# Patient Record
Sex: MALE | Race: WHITE | NOT HISPANIC OR LATINO | Employment: FULL TIME | URBAN - METROPOLITAN AREA
[De-identification: names, ages, dates, MRNs, and addresses within clinical notes are randomized per-mention and may not be internally consistent; named-entity substitution may affect disease eponyms.]

---

## 2017-09-12 ENCOUNTER — TELEPHONE (OUTPATIENT)
Dept: INTERNAL MEDICINE | Facility: CLINIC | Age: 37
End: 2017-09-12

## 2017-09-12 NOTE — TELEPHONE ENCOUNTER
I returned call to patient and advised him of the need to be seen and he did schedule an appointment with Claudette scott

## 2017-09-12 NOTE — TELEPHONE ENCOUNTER
----- Message from Yuko James sent at 9/12/2017 12:31 PM CDT -----  Pt has a bone spur in his back that Dr Cohen is aware of.  He said every now and then it acts up with pain.  He did something yesterday so it's hurting him today.  He wanted to see if something could be called into [facility].  Call pt at [phone] to let him know this can be done.

## 2017-09-13 ENCOUNTER — OFFICE VISIT (OUTPATIENT)
Dept: URGENT CARE | Facility: CLINIC | Age: 37
End: 2017-09-13
Payer: COMMERCIAL

## 2017-09-13 VITALS
WEIGHT: 180.13 LBS | BODY MASS INDEX: 28.95 KG/M2 | OXYGEN SATURATION: 99 % | TEMPERATURE: 98 F | HEIGHT: 66 IN | DIASTOLIC BLOOD PRESSURE: 90 MMHG | HEART RATE: 71 BPM | SYSTOLIC BLOOD PRESSURE: 128 MMHG

## 2017-09-13 DIAGNOSIS — M54.50 ACUTE LEFT-SIDED LOW BACK PAIN WITHOUT SCIATICA: Primary | ICD-10-CM

## 2017-09-13 PROCEDURE — 99214 OFFICE O/P EST MOD 30 MIN: CPT | Mod: 25,S$GLB,, | Performed by: NURSE PRACTITIONER

## 2017-09-13 PROCEDURE — 99999 PR PBB SHADOW E&M-EST. PATIENT-LVL III: CPT | Mod: PBBFAC,,, | Performed by: NURSE PRACTITIONER

## 2017-09-13 PROCEDURE — 96372 THER/PROPH/DIAG INJ SC/IM: CPT | Mod: S$GLB,,, | Performed by: NURSE PRACTITIONER

## 2017-09-13 PROCEDURE — 3080F DIAST BP >= 90 MM HG: CPT | Mod: S$GLB,,, | Performed by: NURSE PRACTITIONER

## 2017-09-13 PROCEDURE — 3008F BODY MASS INDEX DOCD: CPT | Mod: S$GLB,,, | Performed by: NURSE PRACTITIONER

## 2017-09-13 PROCEDURE — 3074F SYST BP LT 130 MM HG: CPT | Mod: S$GLB,,, | Performed by: NURSE PRACTITIONER

## 2017-09-13 RX ORDER — NAPROXEN 500 MG/1
500 TABLET ORAL 2 TIMES DAILY PRN
Qty: 30 TABLET | Refills: 0 | Status: SHIPPED | OUTPATIENT
Start: 2017-09-13 | End: 2017-09-23

## 2017-09-13 RX ORDER — CYCLOBENZAPRINE HCL 5 MG
5 TABLET ORAL 3 TIMES DAILY PRN
Qty: 21 TABLET | Refills: 0 | Status: SHIPPED | OUTPATIENT
Start: 2017-09-13 | End: 2017-09-20

## 2017-09-13 RX ORDER — KETOROLAC TROMETHAMINE 30 MG/ML
60 INJECTION, SOLUTION INTRAMUSCULAR; INTRAVENOUS
Status: COMPLETED | OUTPATIENT
Start: 2017-09-13 | End: 2017-09-13

## 2017-09-13 RX ADMIN — KETOROLAC TROMETHAMINE 60 MG: 30 INJECTION, SOLUTION INTRAMUSCULAR; INTRAVENOUS at 10:09

## 2017-09-13 NOTE — PATIENT INSTRUCTIONS
Self-Care for Low Back Pain    Most people have low back pain now and then. In many cases, it isnt serious and self-care can help. Sometimes low back pain can be a sign of a bigger problem. Call your healthcare provider if your pain returns often or gets worse over time. For the long-term care of your back, get regular exercise, lose any excess weight and learn good posture.  Take a short rest  Lying down during the day may be beneficial for short periods of time if severe pain increases with sitting or standing. Long-term bed rest could be detrimental.  Reduce pain and swelling  Cold reduces swelling. Both cold and heat can reduce pain. Protect your skin by placing a towel between your body and the ice or heat source.  · For the first few days, apply an ice pack for 15 to 20 minutes .  · After the first few days, try heat for 15 minutes at a time to ease pain. Never sleep on a heating pad.  · Over-the-counter medicine can help control pain and swelling. Try aspirin or ibuprofen.  Exercise  Exercise can help your back heal. It also helps your back get stronger and more flexible, preventing any reinjury. Ask your healthcare provider about specific exercises for your back.  Use good posture to avoid reinjury  · When moving, bend at the hips and knees. Dont bend at the waist or twist around.  · When lifting, keep the object close to your body. Dont try to lift more than you can handle.  · When sitting, keep your lower back supported. Use a rolled-up towel as needed.  Seek immediate medical care if:  · Youre unable to stand or walk.  · You have a temperature over 100.4°F (38.0°C)  · You have frequent, painful, or bloody urination.  · You have severe abdominal pain.  · You have a sharp, stabbing pain.  · Your pain is constant.  · You have pain or numbness in your leg.  · You feel pain in a new area of your back.  · You notice that the pain isnt decreasing after more than a week.   Date Last Reviewed: 9/29/2015  ©  9818-6939 [employer]. [address] [place] [address], [place] [address]. All rights reserved. This information is not intended as a substitute for professional medical care. Always follow your healthcare professional's instructions.        Self-Care for Low Back Pain    Most people have low back pain now and then. In many cases, it isnt serious and self-care can help. Sometimes low back pain can be a sign of a bigger problem. Call your healthcare provider if your pain returns often or gets worse over time. For the long-term care of your back, get regular exercise, lose any excess weight and learn good posture.  Take a short rest  Lying down during the day may be beneficial for short periods of time if severe pain increases with sitting or standing. Long-term bed rest could be detrimental.  Reduce pain and swelling  Cold reduces swelling. Both cold and heat can reduce pain. Protect your skin by placing a towel between your body and the ice or heat source.  · For the first few days, apply an ice pack for 15 to 20 minutes .  · After the first few days, try heat for 15 minutes at a time to ease pain. Never sleep on a heating pad.  · Over-the-counter medicine can help control pain and swelling. Try aspirin or ibuprofen.  Exercise  Exercise can help your back heal. It also helps your back get stronger and more flexible, preventing any reinjury. Ask your healthcare provider about specific exercises for your back.  Use good posture to avoid reinjury  · When moving, bend at the hips and knees. Dont bend at the waist or twist around.  · When lifting, keep the object close to your body. Dont try to lift more than you can handle.  · When sitting, keep your lower back supported. Use a rolled-up towel as needed.  Seek immediate medical care if:  · Youre unable to stand or walk.  · You have a temperature over 100.4°F (38.0°C)  · You have frequent, painful, or bloody urination.  · You have severe abdominal pain.  · You  have a sharp, stabbing pain.  · Your pain is constant.  · You have pain or numbness in your leg.  · You feel pain in a new area of your back.  · You notice that the pain isnt decreasing after more than a week.   Date Last Reviewed: 9/29/2015  © 3682-5626 [employer]. [address] [place] [address], [place] [address]. All rights reserved. This information is not intended as a substitute for professional medical care. Always follow your healthcare professional's instructions.

## 2017-09-13 NOTE — PROGRESS NOTES
"Subjective:       Patient ID: Iban Amos is a 37 y.o. male.    Chief Complaint: Back Pain    Back Pain   This is a new problem. Episode onset: 2 days ago. The problem occurs constantly. The problem is unchanged. The pain is present in the lumbar spine. The quality of the pain is described as stabbing. The pain radiates to the left thigh. The pain is at a severity of 6/10. The pain is the same all the time. The symptoms are aggravated by bending, position and twisting. Associated symptoms include leg pain. Pertinent negatives include no abdominal pain, bladder incontinence, bowel incontinence, chest pain, dysuria, fever, headaches, numbness, paresis, paresthesias, tingling or weakness. Treatments tried: Aleve/ Advil.       BP (!) 128/90 (BP Location: Left arm, Patient Position: Sitting, BP Method: Medium (Automatic))   Pulse 71   Temp 97.6 °F (36.4 °C) (Tympanic)   Ht 5' 6" (1.676 m)   Wt 81.7 kg (180 lb 1.9 oz)   SpO2 99%   BMI 29.07 kg/m²     Review of Systems   Constitutional: Negative for chills, diaphoresis and fever.   HENT: Negative for congestion, ear discharge, ear pain, postnasal drip, sinus pressure and sore throat.    Respiratory: Negative for cough, chest tightness and shortness of breath.    Cardiovascular: Negative for chest pain and palpitations.   Gastrointestinal: Negative for abdominal distention, abdominal pain, bowel incontinence, diarrhea, nausea and vomiting.   Genitourinary: Negative for bladder incontinence, difficulty urinating and dysuria.   Musculoskeletal: Positive for back pain. Negative for myalgias.   Skin: Negative for rash and wound.   Allergic/Immunologic: Negative for immunocompromised state.   Neurological: Negative for dizziness, tingling, weakness, light-headedness, numbness, headaches and paresthesias.   Hematological: Does not bruise/bleed easily.   Psychiatric/Behavioral: Negative for behavioral problems and confusion.       Objective:      Physical Exam "   Constitutional: He is oriented to person, place, and time. He appears well-developed and well-nourished. No distress.   Eyes: Conjunctivae and EOM are normal. Pupils are equal, round, and reactive to light.   Neck: Normal range of motion. Neck supple.   Cardiovascular: Normal rate, regular rhythm, normal heart sounds and intact distal pulses.    Pulmonary/Chest: Effort normal and breath sounds normal. No respiratory distress. He has no wheezes.   Abdominal: Soft. Bowel sounds are normal.   Musculoskeletal:        Lumbar back: He exhibits tenderness, pain and spasm. He exhibits normal range of motion, no bony tenderness, no swelling and no deformity.        Back:        Normal gait  Posture is normal     Able to touch toes with moderate discomfort   Leans  back  and from side to side with moderate discomfort  Torso rotation with moderate  discomfort.   There is tenderness of lumbar spine  Nontender sacroiliacs  Negative straight leg test, No swelling, erythema, rash, numbness, tingling  Denies numbness, focal weakness, urinary/fecal incontinence, saddle anesthesia, fever, or chills.     Neurological: He is alert and oriented to person, place, and time.   Skin: Skin is warm and dry. No rash noted.   Psychiatric: He has a normal mood and affect. His behavior is normal.   Nursing note and vitals reviewed.      Assessment:       1. Acute left-sided low back pain without sciatica        Plan:       Iban Suarez was seen today for back pain.    Diagnoses and all orders for this visit:    Acute left-sided low back pain without sciatica  -     ketorolac injection 60 mg; Inject 2 mLs (60 mg total) into the muscle one time.  -     naproxen (NAPROSYN) 500 MG tablet; Take 1 tablet (500 mg total) by mouth 2 (two) times daily as needed (pain).  -     cyclobenzaprine (FLEXERIL) 5 MG tablet; Take 1 tablet (5 mg total) by mouth 3 (three) times daily as needed for Muscle spasms.    - Ice may work best the first two days after  injury, apply an ice pack to the painful area for 20  minutes every 2-4 hours.      -  Use heating pad to back for 20-30 minutes several times a day. May alternate with ice depending on what provides the best relief.  -   May try ower back exercises and stretches and gentle massage.  -   Avoid heavy lifting or any activities that aggravate back pain.     -   Maintain good posture.  Follow up with PCP in 2 weeks if no improvement or sooner if pain worsens.  Go to ER immediately if bowel/bladder incontinence, numbness/tingling in lower extremities, urinary retention or bilateral leg weakness.

## 2017-09-18 DIAGNOSIS — I10 ESSENTIAL HYPERTENSION: Primary | ICD-10-CM

## 2017-09-26 ENCOUNTER — OFFICE VISIT (OUTPATIENT)
Dept: INTERNAL MEDICINE | Facility: CLINIC | Age: 37
End: 2017-09-26
Payer: COMMERCIAL

## 2017-09-26 ENCOUNTER — LAB VISIT (OUTPATIENT)
Dept: LAB | Facility: HOSPITAL | Age: 37
End: 2017-09-26
Attending: FAMILY MEDICINE
Payer: COMMERCIAL

## 2017-09-26 DIAGNOSIS — I10 ESSENTIAL HYPERTENSION: ICD-10-CM

## 2017-09-26 DIAGNOSIS — Z00.00 ROUTINE GENERAL MEDICAL EXAMINATION AT A HEALTH CARE FACILITY: Primary | ICD-10-CM

## 2017-09-26 DIAGNOSIS — R25.1 TREMOR: ICD-10-CM

## 2017-09-26 DIAGNOSIS — E78.5 HYPERLIPIDEMIA, UNSPECIFIED HYPERLIPIDEMIA TYPE: ICD-10-CM

## 2017-09-26 DIAGNOSIS — G47.30 SLEEP APNEA, UNSPECIFIED TYPE: ICD-10-CM

## 2017-09-26 DIAGNOSIS — Z00.00 ROUTINE GENERAL MEDICAL EXAMINATION AT A HEALTH CARE FACILITY: ICD-10-CM

## 2017-09-26 LAB
25(OH)D3+25(OH)D2 SERPL-MCNC: 23 NG/ML
ALBUMIN SERPL BCP-MCNC: 4.2 G/DL
ALP SERPL-CCNC: 96 U/L
ALT SERPL W/O P-5'-P-CCNC: 54 U/L
ANION GAP SERPL CALC-SCNC: 8 MMOL/L
AST SERPL-CCNC: 28 U/L
BASOPHILS # BLD AUTO: 0.08 K/UL
BASOPHILS # BLD AUTO: 0.08 K/UL
BASOPHILS NFR BLD: 1 %
BASOPHILS NFR BLD: 1 %
BILIRUB SERPL-MCNC: 0.8 MG/DL
BUN SERPL-MCNC: 15 MG/DL
CALCIUM SERPL-MCNC: 9.5 MG/DL
CHLORIDE SERPL-SCNC: 105 MMOL/L
CHOLEST SERPL-MCNC: 213 MG/DL
CHOLEST/HDLC SERPL: 4.6 {RATIO}
CO2 SERPL-SCNC: 28 MMOL/L
CREAT SERPL-MCNC: 0.9 MG/DL
CREAT UR-MCNC: 139 MG/DL
DIFFERENTIAL METHOD: ABNORMAL
DIFFERENTIAL METHOD: ABNORMAL
EOSINOPHIL # BLD AUTO: 0.5 K/UL
EOSINOPHIL # BLD AUTO: 0.5 K/UL
EOSINOPHIL NFR BLD: 5.8 %
EOSINOPHIL NFR BLD: 5.8 %
ERYTHROCYTE [DISTWIDTH] IN BLOOD BY AUTOMATED COUNT: 12.4 %
ERYTHROCYTE [DISTWIDTH] IN BLOOD BY AUTOMATED COUNT: 12.4 %
EST. GFR  (AFRICAN AMERICAN): >60 ML/MIN/1.73 M^2
EST. GFR  (NON AFRICAN AMERICAN): >60 ML/MIN/1.73 M^2
ESTIMATED AVG GLUCOSE: 94 MG/DL
GLUCOSE SERPL-MCNC: 76 MG/DL
HBA1C MFR BLD HPLC: 4.9 %
HCT VFR BLD AUTO: 44.7 %
HCT VFR BLD AUTO: 44.7 %
HDLC SERPL-MCNC: 46 MG/DL
HDLC SERPL: 21.6 %
HGB BLD-MCNC: 15.7 G/DL
HGB BLD-MCNC: 15.7 G/DL
LDLC SERPL CALC-MCNC: 135 MG/DL
LYMPHOCYTES # BLD AUTO: 3.1 K/UL
LYMPHOCYTES # BLD AUTO: 3.1 K/UL
LYMPHOCYTES NFR BLD: 37.8 %
LYMPHOCYTES NFR BLD: 37.8 %
MCH RBC QN AUTO: 31.9 PG
MCH RBC QN AUTO: 31.9 PG
MCHC RBC AUTO-ENTMCNC: 35.1 G/DL
MCHC RBC AUTO-ENTMCNC: 35.1 G/DL
MCV RBC AUTO: 91 FL
MCV RBC AUTO: 91 FL
MICROALBUMIN UR DL<=1MG/L-MCNC: 10 UG/ML
MICROALBUMIN/CREATININE RATIO: 7.2 UG/MG
MONOCYTES # BLD AUTO: 0.8 K/UL
MONOCYTES # BLD AUTO: 0.8 K/UL
MONOCYTES NFR BLD: 10 %
MONOCYTES NFR BLD: 10 %
NEUTROPHILS # BLD AUTO: 3.8 K/UL
NEUTROPHILS # BLD AUTO: 3.8 K/UL
NEUTROPHILS NFR BLD: 45 %
NEUTROPHILS NFR BLD: 45 %
NONHDLC SERPL-MCNC: 167 MG/DL
PLATELET # BLD AUTO: 203 K/UL
PLATELET # BLD AUTO: 203 K/UL
PMV BLD AUTO: 11.2 FL
PMV BLD AUTO: 11.2 FL
POTASSIUM SERPL-SCNC: 3.7 MMOL/L
PROT SERPL-MCNC: 7.7 G/DL
RBC # BLD AUTO: 4.92 M/UL
RBC # BLD AUTO: 4.92 M/UL
SODIUM SERPL-SCNC: 141 MMOL/L
T4 FREE SERPL-MCNC: 0.9 NG/DL
TRIGL SERPL-MCNC: 160 MG/DL
TSH SERPL DL<=0.005 MIU/L-ACNC: 1.17 UIU/ML
TSH SERPL DL<=0.005 MIU/L-ACNC: 1.17 UIU/ML
WBC # BLD AUTO: 8.31 K/UL
WBC # BLD AUTO: 8.31 K/UL

## 2017-09-26 PROCEDURE — 99999 PR PBB SHADOW E&M-EST. PATIENT-LVL III: CPT | Mod: PBBFAC,,, | Performed by: FAMILY MEDICINE

## 2017-09-26 PROCEDURE — 36415 COLL VENOUS BLD VENIPUNCTURE: CPT | Mod: PO

## 2017-09-26 PROCEDURE — 82570 ASSAY OF URINE CREATININE: CPT

## 2017-09-26 PROCEDURE — 84439 ASSAY OF FREE THYROXINE: CPT

## 2017-09-26 PROCEDURE — 85025 COMPLETE CBC W/AUTO DIFF WBC: CPT

## 2017-09-26 PROCEDURE — 80053 COMPREHEN METABOLIC PANEL: CPT

## 2017-09-26 PROCEDURE — 84443 ASSAY THYROID STIM HORMONE: CPT

## 2017-09-26 PROCEDURE — 83036 HEMOGLOBIN GLYCOSYLATED A1C: CPT

## 2017-09-26 PROCEDURE — 80061 LIPID PANEL: CPT

## 2017-09-26 PROCEDURE — 99395 PREV VISIT EST AGE 18-39: CPT | Mod: S$GLB,,, | Performed by: FAMILY MEDICINE

## 2017-09-26 PROCEDURE — 82306 VITAMIN D 25 HYDROXY: CPT

## 2017-09-26 RX ORDER — LISINOPRIL 10 MG/1
10 TABLET ORAL DAILY
Qty: 90 TABLET | Refills: 3 | Status: SHIPPED | OUTPATIENT
Start: 2017-09-26 | End: 2018-06-04 | Stop reason: SDUPTHER

## 2017-09-30 VITALS
WEIGHT: 178.56 LBS | DIASTOLIC BLOOD PRESSURE: 89 MMHG | SYSTOLIC BLOOD PRESSURE: 138 MMHG | TEMPERATURE: 99 F | HEIGHT: 66 IN | BODY MASS INDEX: 28.7 KG/M2

## 2017-09-30 NOTE — PROGRESS NOTES
Subjective:      Patient ID: Iban Amos is a 37 y.o. male.    Chief Complaint: Annual Exam     The patient is a 37-year-old  of Viv Amos, a patient of [facility], coming in today because for prevention exam.   htn- not controlled today however in the past has been controlled with lisinopril 10.  He's mainly been out of it ROM is to full year.  Never really got it refilled.  Should have and just didn't make the time.  Also didn't do lab work last year.    Sleep apnea- using nasal device. Doing well.  Hyperlipidemia- elevated Tg in past. Cut out sodas now.  Willing to repeat labs today.  Is having recurring headaches.  His wife's also notices had a tremor with dinner.  He doesn't seem to think it's bothersome but she notices it.  There is no family history of a tremor.                                                                               PAST MEDICAL HISTORY:  Sleep apnea, htn, lumbar back spasms.                                                                                                                        PAST SURGICAL HISTORY:  Tonsillectomy and nose reconstruction.                                                                                            ALLERGIES:  He is allergic to codeine and penicillin.                                                                                                     FAMILY HISTORY:  Mother  due to breast cancer.  Father has high blood    pressure and heart trouble and he had a stroke.  Brother had diabetes and    obesity that he is aware of.                                                                                                                              SOCIAL HISTORY:  He is  to Viv Amos, a patient of mine.   He does not smoke.  He does drink alcohol     once a month.  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             Lab Results   Component Value Date    WBC 2017    WBC 82017     HGB 15.7 09/26/2017    HGB 15.7 09/26/2017    HCT 44.7 09/26/2017    HCT 44.7 09/26/2017     09/26/2017     09/26/2017    CHOL 213 (H) 09/26/2017    TRIG 160 (H) 09/26/2017    HDL 46 09/26/2017    ALT 54 (H) 09/26/2017    AST 28 09/26/2017     09/26/2017    K 3.7 09/26/2017     09/26/2017    CREATININE 0.9 09/26/2017    BUN 15 09/26/2017    CO2 28 09/26/2017    TSH 1.171 09/26/2017    TSH 1.171 09/26/2017    HGBA1C 4.9 09/26/2017       Review of Systems   Constitutional: Negative for chills, fatigue and unexpected weight change.   HENT: Negative for congestion, ear pain, postnasal drip, sneezing, sore throat and trouble swallowing.    Eyes: Negative for pain and visual disturbance.   Respiratory: Negative for cough and shortness of breath.    Cardiovascular: Negative for chest pain and leg swelling.   Gastrointestinal: Negative for abdominal pain, constipation, diarrhea, nausea and vomiting.   Endocrine: Negative for cold intolerance and heat intolerance.   Genitourinary: Negative for difficulty urinating, dysuria and flank pain.   Musculoskeletal: Negative for arthralgias, back pain, joint swelling and neck pain.   Skin: Negative for color change and rash.   Neurological: Positive for tremors and headaches. Negative for dizziness and seizures.   Psychiatric/Behavioral: Negative for behavioral problems and dysphoric mood.     Objective:     Physical Exam   Constitutional: He is oriented to person, place, and time. He appears well-developed and well-nourished. No distress.   HENT:   Head: Normocephalic and atraumatic.   Right Ear: External ear normal.   Left Ear: External ear normal.   Nose: Nose normal.   Mouth/Throat: Oropharynx is clear and moist.   Eyes: EOM are normal. Pupils are equal, round, and reactive to light.   Neck: Normal range of motion. Neck supple. No thyromegaly present.   Cardiovascular: Normal rate and regular rhythm.  Exam reveals no gallop and no friction rub.    No  murmur heard.  Pulmonary/Chest: Effort normal and breath sounds normal. No respiratory distress.   Abdominal: Soft. Bowel sounds are normal. He exhibits no distension. There is no tenderness. There is no rebound.   Musculoskeletal: Normal range of motion.   Lymphadenopathy:     He has no cervical adenopathy.   Neurological: He is alert and oriented to person, place, and time. He has normal strength. He displays tremor. No cranial nerve deficit or sensory deficit. Coordination and gait normal.   Finger to nose normal.  Mild evidence of an intention tremor.  Not bothersome to the patient   Skin: Skin is warm and dry.   Psychiatric: He has a normal mood and affect.   Vitals reviewed.    Assessment:     1. Routine general medical examination at a health care facility    2. Hyperlipidemia, unspecified hyperlipidemia type    3. Essential hypertension    4. Tremor    5. Sleep apnea, unspecified type      Plan:   Iban Suarez was seen today for annual exam.    Diagnoses and all orders for this visit:    Routine general medical examination at a health care facility - labs ordered. Discussed Health Maintenance issues.     -     TSH; Future  -     T4, free; Future  -     Lipid panel; Future  -     Microalbumin/creatinine urine ratio  -     Hemoglobin A1c; Future  -     Comprehensive metabolic panel; Future  -     CBC auto differential; Future  -     Vitamin D; Future    Hyperlipidemia, unspecified hyperlipidemia type - - stable, labs ordered today.  Continue with current interventions until labs are reviewed to make adjustments.     -     TSH; Future  -     T4, free; Future  -     Lipid panel; Future  -     Microalbumin/creatinine urine ratio  -     Hemoglobin A1c; Future  -     Comprehensive metabolic panel; Future  -     CBC auto differential; Future  -     Vitamin D; Future    Essential hypertension- not controlled, restarting lisinopril doing lab work today  -     TSH; Future  -     T4, free; Future  -     Lipid panel;  Future  -     Microalbumin/creatinine urine ratio  -     Hemoglobin A1c; Future  -     Comprehensive metabolic panel; Future  -     CBC auto differential; Future  -     Vitamin D; Future    Tremor-new not problematic for the patient will check thyroid labs today as well as electrolytes.  Discussed if becomes more bothersome could do imaging studies  -     TSH; Future  -     T4, free; Future  -     Lipid panel; Future  -     Microalbumin/creatinine urine ratio  -     Hemoglobin A1c; Future  -     Comprehensive metabolic panel; Future  -     CBC auto differential; Future  -     Vitamin D; Future    Sleep apnea, unspecified type-stable with device using regularly.  Benefiting from device    Other orders  -     lisinopril 10 MG tablet; Take 1 tablet (10 mg total) by mouth once daily.            Return in about 1 year (around 9/26/2018) for physical with Dr SANDERS.

## 2018-03-28 ENCOUNTER — OFFICE VISIT (OUTPATIENT)
Dept: INTERNAL MEDICINE | Facility: CLINIC | Age: 38
End: 2018-03-28
Payer: COMMERCIAL

## 2018-03-28 VITALS
SYSTOLIC BLOOD PRESSURE: 130 MMHG | BODY MASS INDEX: 28.13 KG/M2 | WEIGHT: 179.25 LBS | TEMPERATURE: 99 F | DIASTOLIC BLOOD PRESSURE: 86 MMHG | HEIGHT: 67 IN | HEART RATE: 84 BPM

## 2018-03-28 DIAGNOSIS — M54.50 ACUTE LEFT-SIDED LOW BACK PAIN WITHOUT SCIATICA: Primary | ICD-10-CM

## 2018-03-28 PROCEDURE — 99213 OFFICE O/P EST LOW 20 MIN: CPT | Mod: S$GLB,,, | Performed by: INTERNAL MEDICINE

## 2018-03-28 PROCEDURE — 3075F SYST BP GE 130 - 139MM HG: CPT | Mod: CPTII,S$GLB,, | Performed by: INTERNAL MEDICINE

## 2018-03-28 PROCEDURE — 3079F DIAST BP 80-89 MM HG: CPT | Mod: CPTII,S$GLB,, | Performed by: INTERNAL MEDICINE

## 2018-03-28 PROCEDURE — 99999 PR PBB SHADOW E&M-EST. PATIENT-LVL III: CPT | Mod: PBBFAC,,, | Performed by: INTERNAL MEDICINE

## 2018-03-28 RX ORDER — CELECOXIB 200 MG/1
200 CAPSULE ORAL DAILY
Qty: 30 CAPSULE | Refills: 0 | Status: SHIPPED | OUTPATIENT
Start: 2018-03-28 | End: 2018-04-27

## 2018-03-28 RX ORDER — CYCLOBENZAPRINE HCL 10 MG
10 TABLET ORAL NIGHTLY
Qty: 10 TABLET | Refills: 0 | Status: SHIPPED | OUTPATIENT
Start: 2018-03-28 | End: 2018-04-07

## 2018-03-28 NOTE — PROGRESS NOTES
"Subjective:       Patient ID: Iban Amos is a 38 y.o. male.    Chief Complaint: Back Pain    HPI Patient is a 38-year-old male presenting today with some acute on chronic back issues.  He indicates over the years she's had some problems with lower back discomfort which is been basically arthritic in nature.  He relates no history of trauma or injury.  I would last couple months she's had a little bit more trouble than usual.  He states it typically will flare little bit and get better but it flared up a few weeks ago and that this has really settled down.  He describes the pain in the low left lower back upper buttocks region.  He has noticed a little bit of radiation into the upper thigh and groin region.  There is been no recent trauma.  He notes no bowel or bladder dysfunction.    Review of Systems    Objective:   /86   Pulse 84   Temp 98.8 °F (37.1 °C) (Tympanic)   Ht 5' 7" (1.702 m)   Wt 81.3 kg (179 lb 3.7 oz)   BMI 28.07 kg/m²      Physical Exam   Constitutional: He appears well-developed and well-nourished.   HENT:   Head: Normocephalic and atraumatic.   Eyes: Pupils are equal, round, and reactive to light.   Neck: Neck supple. No thyromegaly present.   Cardiovascular: Normal rate, regular rhythm and normal heart sounds.  Exam reveals no gallop and no friction rub.    No murmur heard.  Pulmonary/Chest: Breath sounds normal. He has no wheezes. He has no rales.   Abdominal: Soft. Bowel sounds are normal. He exhibits no distension. There is no tenderness.   Musculoskeletal:   No spinous process tenderness is noted.  Mild tenderness to palpation in the superior gluteal lower lumbar region on the left-hand side.  Straight leg raises are limited to 70° bilaterally due to tightness in the legs.  Figure fours are normal bilaterally no radiculopathy.  Reflexes are 2+ in the knees bilaterally   Vitals reviewed.          Assessment:       1. Acute left-sided low back pain without sciatica      "   Plan:   No problem-specific Assessment & Plan notes found for this encounter.    Iban Suarez was seen today for back pain.    Diagnoses and all orders for this visit:    Acute left-sided low back pain without sciatica  Comments:  celebrex 200 mg once daily for 2 - 4 weeks, flexeril 10 mg once daily at bedtime.  If not improving by Monday, will refer to [facility]  Orders:  -     celecoxib (CELEBREX) 200 MG capsule; Take 1 capsule (200 mg total) by mouth once daily.  -     cyclobenzaprine (FLEXERIL) 10 MG tablet; Take 1 tablet (10 mg total) by mouth every evening.        Follow-up if symptoms worsen or fail to improve.

## 2018-06-04 RX ORDER — LISINOPRIL 10 MG/1
10 TABLET ORAL DAILY
Qty: 90 TABLET | Refills: 0 | Status: SHIPPED | OUTPATIENT
Start: 2018-06-04 | End: 2018-09-04 | Stop reason: SDUPTHER

## 2018-08-22 ENCOUNTER — PATIENT OUTREACH (OUTPATIENT)
Dept: INTERNAL MEDICINE | Facility: CLINIC | Age: 38
End: 2018-08-22

## 2018-08-22 DIAGNOSIS — I10 ESSENTIAL HYPERTENSION: ICD-10-CM

## 2018-08-22 DIAGNOSIS — Z00.00 BLOOD TESTS FOR ROUTINE GENERAL PHYSICAL EXAMINATION: ICD-10-CM

## 2018-08-22 DIAGNOSIS — E78.5 HYPERLIPIDEMIA, UNSPECIFIED HYPERLIPIDEMIA TYPE: Primary | ICD-10-CM

## 2018-09-04 ENCOUNTER — OFFICE VISIT (OUTPATIENT)
Dept: INTERNAL MEDICINE | Facility: CLINIC | Age: 38
End: 2018-09-04
Payer: COMMERCIAL

## 2018-09-04 VITALS
TEMPERATURE: 97 F | BODY MASS INDEX: 28.65 KG/M2 | SYSTOLIC BLOOD PRESSURE: 110 MMHG | WEIGHT: 182.56 LBS | HEIGHT: 67 IN | DIASTOLIC BLOOD PRESSURE: 64 MMHG

## 2018-09-04 DIAGNOSIS — N40.1 BENIGN PROSTATIC HYPERPLASIA WITH LOWER URINARY TRACT SYMPTOMS, SYMPTOM DETAILS UNSPECIFIED: ICD-10-CM

## 2018-09-04 DIAGNOSIS — I10 ESSENTIAL HYPERTENSION: ICD-10-CM

## 2018-09-04 DIAGNOSIS — Z23 NEED FOR TDAP VACCINATION: ICD-10-CM

## 2018-09-04 DIAGNOSIS — Z00.00 ROUTINE GENERAL MEDICAL EXAMINATION AT A HEALTH CARE FACILITY: Primary | ICD-10-CM

## 2018-09-04 DIAGNOSIS — G47.33 OBSTRUCTIVE SLEEP APNEA ON CPAP: ICD-10-CM

## 2018-09-04 DIAGNOSIS — E78.5 HYPERLIPIDEMIA, UNSPECIFIED HYPERLIPIDEMIA TYPE: ICD-10-CM

## 2018-09-04 PROCEDURE — 99396 PREV VISIT EST AGE 40-64: CPT | Mod: 25,S$GLB,, | Performed by: FAMILY MEDICINE

## 2018-09-04 PROCEDURE — 90471 IMMUNIZATION ADMIN: CPT | Mod: S$GLB,,, | Performed by: FAMILY MEDICINE

## 2018-09-04 PROCEDURE — 3078F DIAST BP <80 MM HG: CPT | Mod: CPTII,S$GLB,, | Performed by: FAMILY MEDICINE

## 2018-09-04 PROCEDURE — 3074F SYST BP LT 130 MM HG: CPT | Mod: CPTII,S$GLB,, | Performed by: FAMILY MEDICINE

## 2018-09-04 PROCEDURE — 90715 TDAP VACCINE 7 YRS/> IM: CPT | Mod: S$GLB,,, | Performed by: FAMILY MEDICINE

## 2018-09-04 PROCEDURE — 99999 PR PBB SHADOW E&M-EST. PATIENT-LVL III: CPT | Mod: PBBFAC,,, | Performed by: FAMILY MEDICINE

## 2018-09-04 RX ORDER — LISINOPRIL 10 MG/1
10 TABLET ORAL DAILY
Qty: 90 TABLET | Refills: 4 | Status: SHIPPED | OUTPATIENT
Start: 2018-09-04 | End: 2019-04-03

## 2018-09-04 NOTE — PROGRESS NOTES
Subjective:      Patient ID: Iban Amos is a 38 y.o. male.    Chief Complaint: Annual Exam    Disclaimer:  This note is prepared using voice recognition software and as such is likely to have errors and has not been proof read. Please contact me for questions.     Iban Amos is a 38 y.o. male who presents today for a wellness exam. He is doing well.  Recently had a life insurance policy where they did some lab work.  Reports he can bring in copies of the results.  Having a little bit of lower urinary tract symptoms and would like to see a urologist.  Uncertain if it might be enlarged or not.  Believes his PSA was done through the life insurance policy.  Sometimes difficult straining at times.      Also uses CPAP at night.  Lately having more snoring through CPAP.  Has tried auto titration before but just did not work as well.  Believes he probably needs to get a new mask.  Uses a nose mask.  Usually gets the [employer].  Willing to sees sleep specialist if not improving.      Blood pressure is controlled today 10 mg dose of lisinopril controlling his symptoms well.    Is needing a Tdap today          Lab Results   Component Value Date    WBC 8.31 09/26/2017    WBC 8.31 09/26/2017    HGB 15.7 09/26/2017    HGB 15.7 09/26/2017    HCT 44.7 09/26/2017    HCT 44.7 09/26/2017     09/26/2017     09/26/2017    CHOL 213 (H) 09/26/2017    TRIG 160 (H) 09/26/2017    HDL 46 09/26/2017    ALT 54 (H) 09/26/2017    AST 28 09/26/2017     09/26/2017    K 3.7 09/26/2017     09/26/2017    CREATININE 0.9 09/26/2017    BUN 15 09/26/2017    CO2 28 09/26/2017    TSH 1.171 09/26/2017    TSH 1.171 09/26/2017    HGBA1C 4.9 09/26/2017       Review of Systems   Constitutional: Negative for chills, fatigue and unexpected weight change.   HENT: Negative for congestion, ear pain, postnasal drip, sneezing, sore throat and trouble swallowing.    Eyes: Negative for pain and visual disturbance.  "  Respiratory: Negative for cough and shortness of breath.    Cardiovascular: Negative for chest pain and leg swelling.   Gastrointestinal: Positive for constipation. Negative for abdominal distention, abdominal pain, diarrhea, nausea and vomiting.   Endocrine: Negative for cold intolerance and heat intolerance.   Genitourinary: Positive for difficulty urinating. Negative for dysuria and flank pain.   Musculoskeletal: Negative for arthralgias, back pain, joint swelling and neck pain.   Skin: Negative for color change and rash.   Neurological: Negative for dizziness, seizures and headaches.   Psychiatric/Behavioral: Negative for behavioral problems and dysphoric mood.     Objective:     Vitals:    09/04/18 0805   BP: 110/64   Temp: 97.1 °F (36.2 °C)   TempSrc: Tympanic   Weight: 82.8 kg (182 lb 8.7 oz)   Height: 5' 7" (1.702 m)     Physical Exam   Constitutional: He is oriented to person, place, and time. He appears well-developed and well-nourished. No distress.   HENT:   Head: Normocephalic and atraumatic.   Right Ear: External ear normal.   Left Ear: External ear normal.   Nose: Nose normal.   Mouth/Throat: Oropharynx is clear and moist.   Eyes: EOM are normal. Pupils are equal, round, and reactive to light.   Neck: Normal range of motion. Neck supple. No thyromegaly present.   Cardiovascular: Normal rate and regular rhythm. Exam reveals no gallop and no friction rub.   No murmur heard.  Pulmonary/Chest: Effort normal and breath sounds normal. No respiratory distress.   Abdominal: Soft. Bowel sounds are normal. He exhibits no distension. There is no tenderness. There is no rebound.   Musculoskeletal: Normal range of motion.   Lymphadenopathy:     He has no cervical adenopathy.   Neurological: He is alert and oriented to person, place, and time. Coordination normal.   Skin: Skin is warm and dry.   Psychiatric: He has a normal mood and affect.   Vitals reviewed.    Assessment:     1. Routine general medical " examination at a health care facility    2. Obstructive sleep apnea on CPAP    3. Essential hypertension    4. Hyperlipidemia, unspecified hyperlipidemia type    5. Benign prostatic hyperplasia with lower urinary tract symptoms, symptom details unspecified    6. Need for Tdap vaccination      Plan:   Iban Suarez was seen today for annual exam.    Diagnoses and all orders for this visit:    Routine general medical examination at a health care facility -patient will provide labs done recently through the insurance policy.  If abnormal then we will check additional lab work.  Patient reports he believes he has had a PSA    Obstructive sleep apnea on CPAP -patient will obtain new mask.  If does not improve then refer to sleep disorders clinic for titration  -     Ambulatory consult to Sleep Disorders  -     CPAP/BIPAP SUPPLIES    Essential hypertension -controlled with lisinopril 10    Hyperlipidemia, unspecified hyperlipidemia type  Comments:  pt did labs with life insurance policy    Benign prostatic hyperplasia with lower urinary tract symptoms, symptom details unspecified - patient declines prostate exam today.  Would like to be referred to the urologist.  Believe he had a PSA done with his insurance policy will for copies of his results to us  -     Ambulatory referral to Urology    Need for Tdap vaccination    Other orders  -     lisinopril 10 MG tablet; Take 1 tablet (10 mg total) by mouth once daily.  -     (In Office Administered) Tdap Vaccine            Follow-up in about 1 year (around 9/4/2019) for physical with Dr SANDERS.

## 2018-09-21 ENCOUNTER — INITIAL CONSULT (OUTPATIENT)
Dept: UROLOGY | Facility: CLINIC | Age: 38
End: 2018-09-21
Payer: COMMERCIAL

## 2018-09-21 ENCOUNTER — PATIENT MESSAGE (OUTPATIENT)
Dept: UROLOGY | Facility: CLINIC | Age: 38
End: 2018-09-21

## 2018-09-21 VITALS
DIASTOLIC BLOOD PRESSURE: 89 MMHG | WEIGHT: 183.44 LBS | HEART RATE: 58 BPM | HEIGHT: 67 IN | BODY MASS INDEX: 28.79 KG/M2 | SYSTOLIC BLOOD PRESSURE: 135 MMHG

## 2018-09-21 DIAGNOSIS — N40.1 BENIGN PROSTATIC HYPERPLASIA WITH URINARY OBSTRUCTION: ICD-10-CM

## 2018-09-21 DIAGNOSIS — N13.8 BENIGN PROSTATIC HYPERPLASIA WITH URINARY OBSTRUCTION: ICD-10-CM

## 2018-09-21 PROCEDURE — 99244 OFF/OP CNSLTJ NEW/EST MOD 40: CPT | Mod: S$GLB,,, | Performed by: UROLOGY

## 2018-09-21 PROCEDURE — 99999 PR PBB SHADOW E&M-EST. PATIENT-LVL III: CPT | Mod: PBBFAC,,, | Performed by: UROLOGY

## 2018-09-21 RX ORDER — CETIRIZINE HYDROCHLORIDE 10 MG/1
10 TABLET ORAL DAILY
COMMUNITY

## 2018-09-21 RX ORDER — NAPROXEN SODIUM 220 MG
220 TABLET ORAL
COMMUNITY
End: 2018-10-19

## 2018-09-21 NOTE — LETTER
September 21, 2018      Sylwia Cohen MD  [address]  [address]  [place] [address]           [facility]  [address] [place] [address]  Phone: [phone]  Fax: [phone]          Patient: Iban Amos   MR Number: [id number]   YOB: 1980   Date of Visit: 9/21/2018       Dear Dr. Sylwia Choen:    Thank you for referring Iban Amos to me for evaluation. Attached you will find relevant portions of my assessment and plan of care.    If you have questions, please do not hesitate to call me. I look forward to following Iban Amos along with you.    Sincerely,    Bryce Quiroz MD    Enclosure  CC:  No Recipients    If you would like to receive this communication electronically, please contact [email or web address] or [phone] to request more information on [employer] access.    For providers and/or their staff who would like to refer a patient to [facility], please contact us through our one-stop-shop provider referral line, [facility], at [phone].    If you feel you have received this communication in error or would no longer like to receive these types of communications, please e-mail [email or web address]

## 2018-09-21 NOTE — PROGRESS NOTES
Clinic Note  9/21/2018      Subjective:         Chief Complaint:   HPI  Iban Amos is a 38 y.o. male  With LUTS. Consult from Dr. Cohen.  Works in sales for [employer].  Negative family history. Complains of some LUTS and left testalgia.    No results found for: PSA, PSADIAG, PSATOTAL, PSAFREE, PSAFREEPCT   Past Medical History:   Diagnosis Date    Sleep apnea     Unspecified essential hypertension      Family History   Problem Relation Age of Onset    Breast cancer Mother     Hypertension Father     Heart disease Father     Stroke Father     Diabetes Brother     Obesity Brother      Social History     Socioeconomic History    Marital status:      Spouse name: Not on file    Number of children: 3    Years of education: Not on file    Highest education level: Not on file   Social Needs    Financial resource strain: Not on file    Food insecurity - worry: Not on file    Food insecurity - inability: Not on file    Transportation needs - medical: Not on file    Transportation needs - non-medical: Not on file   Occupational History    Occupation: [employer]     Employer: [employer]   Tobacco Use    Smoking status: Never Smoker    Smokeless tobacco: Never Used   Substance and Sexual Activity    Alcohol use: Yes     Alcohol/week: 0.6 oz     Types: 1 Cans of beer per week    Drug use: No    Sexual activity: Yes     Partners: Female   Other Topics Concern    Not on file   Social History Narrative    Not on file     Past Surgical History:   Procedure Laterality Date    RECONSTRUCTION OF NOSE      TONSILLECTOMY, ADENOIDECTOMY       Patient Active Problem List   Diagnosis    Essential hypertension    Sleep apnea    Hyperlipidemia     Review of Systems   Constitutional: Negative for appetite change, chills, fatigue, fever and unexpected weight change.   HENT: Negative for nosebleeds.    Respiratory: Negative for shortness of breath and wheezing.    Cardiovascular:  "Negative for chest pain, palpitations and leg swelling.   Gastrointestinal: Positive for constipation. Negative for abdominal distention, abdominal pain, diarrhea, nausea and vomiting.   Genitourinary: Positive for difficulty urinating and frequency. Negative for dysuria, hematuria and nocturia.        Intermittent, weak stream if he voids while sitting   Musculoskeletal: Negative for arthralgias and back pain.   Skin: Negative for pallor.   Neurological: Negative for dizziness, seizures and syncope.   Hematological: Negative for adenopathy.   Psychiatric/Behavioral: Negative for dysphoric mood.         Objective:      There were no vitals taken for this visit.  Estimated body mass index is 28.59 kg/m² as calculated from the following:    Height as of 9/4/18: 5' 7" (1.702 m).    Weight as of 9/4/18: 82.8 kg (182 lb 8.7 oz).  Physical Exam   Constitutional: He is oriented to person, place, and time. He appears well-developed and well-nourished. No distress.   HENT:   Head: Atraumatic.   Neck: No tracheal deviation present.   Cardiovascular: Normal rate.    Pulmonary/Chest: Effort normal. No respiratory distress. He has no wheezes.   Abdominal: Soft. Bowel sounds are normal. He exhibits no distension and no mass. There is no tenderness. There is no rebound and no guarding. Hernia confirmed negative in the right inguinal area and confirmed negative in the left inguinal area.   Genitourinary: Rectum normal, testes normal and penis normal. Rectal exam shows no external hemorrhoid, no internal hemorrhoid, no mass and no tenderness. Prostate is enlarged.   Lymphadenopathy: No inguinal adenopathy noted on the right or left side.   Neurological: He is alert and oriented to person, place, and time.   Skin: Skin is warm and dry. He is not diaphoretic.     Psychiatric: He has a normal mood and affect. His behavior is normal. Judgment and thought content normal.         Assessment and Plan:           Problem List Items Addressed " This Visit     None          Follow up:   BPH with mild LUTS. Discuused surveillance vs therapeutic options. He wants to surveil for now.  F/U prn.  Letter to Dr. Cohen.    Bryce Quiroz

## 2018-09-24 ENCOUNTER — TELEPHONE (OUTPATIENT)
Dept: PULMONOLOGY | Facility: CLINIC | Age: 38
End: 2018-09-24

## 2018-09-24 DIAGNOSIS — G47.30 SLEEP DISORDER BREATHING: Primary | ICD-10-CM

## 2018-09-24 RX ORDER — TAMSULOSIN HYDROCHLORIDE 0.4 MG/1
0.4 CAPSULE ORAL DAILY
Qty: 30 CAPSULE | Refills: 11 | Status: SHIPPED | OUTPATIENT
Start: 2018-09-24 | End: 2019-10-09 | Stop reason: SDUPTHER

## 2018-09-27 ENCOUNTER — OFFICE VISIT (OUTPATIENT)
Dept: SLEEP MEDICINE | Facility: CLINIC | Age: 38
End: 2018-09-27
Payer: COMMERCIAL

## 2018-09-27 ENCOUNTER — HOSPITAL ENCOUNTER (OUTPATIENT)
Dept: RADIOLOGY | Facility: HOSPITAL | Age: 38
Discharge: HOME | End: 2018-09-27
Attending: INTERNAL MEDICINE
Payer: COMMERCIAL

## 2018-09-27 VITALS
RESPIRATION RATE: 18 BRPM | HEIGHT: 67 IN | DIASTOLIC BLOOD PRESSURE: 80 MMHG | SYSTOLIC BLOOD PRESSURE: 122 MMHG | BODY MASS INDEX: 28.96 KG/M2 | HEART RATE: 65 BPM | WEIGHT: 184.5 LBS | OXYGEN SATURATION: 96 %

## 2018-09-27 DIAGNOSIS — G47.33 OSA ON CPAP: Primary | ICD-10-CM

## 2018-09-27 DIAGNOSIS — E78.5 HYPERLIPIDEMIA, UNSPECIFIED HYPERLIPIDEMIA TYPE: ICD-10-CM

## 2018-09-27 DIAGNOSIS — G47.30 SLEEP DISORDER BREATHING: ICD-10-CM

## 2018-09-27 PROCEDURE — 3079F DIAST BP 80-89 MM HG: CPT | Mod: CPTII,S$GLB,, | Performed by: INTERNAL MEDICINE

## 2018-09-27 PROCEDURE — 99204 OFFICE O/P NEW MOD 45 MIN: CPT | Mod: S$GLB,,, | Performed by: INTERNAL MEDICINE

## 2018-09-27 PROCEDURE — 71046 X-RAY EXAM CHEST 2 VIEWS: CPT | Mod: TC,FY,PO

## 2018-09-27 PROCEDURE — 99999 PR PBB SHADOW E&M-EST. PATIENT-LVL IV: CPT | Mod: PBBFAC,,, | Performed by: INTERNAL MEDICINE

## 2018-09-27 PROCEDURE — 71046 X-RAY EXAM CHEST 2 VIEWS: CPT | Mod: 26,,, | Performed by: RADIOLOGY

## 2018-09-27 PROCEDURE — 3008F BODY MASS INDEX DOCD: CPT | Mod: CPTII,S$GLB,, | Performed by: INTERNAL MEDICINE

## 2018-09-27 PROCEDURE — 3074F SYST BP LT 130 MM HG: CPT | Mod: CPTII,S$GLB,, | Performed by: INTERNAL MEDICINE

## 2018-09-27 NOTE — PATIENT INSTRUCTIONS
What Are CPAP and Other Air Pressure Treatments?   Continuous positive air pressure (CPAP) uses gentle air pressure to hold the airway open. CPAP is often the most effective treatment for sleep apnea and severe snoring. It works very well for many people. But keep in mind that it can take several adjustments before the setup is right for you.   How CPAP Works   A small portable pump beside the bed sends air through a hose, which is held over your nose by a mask. Air is gently pushed through your airway. The air pressure nudges sagging tissues aside. This widens the airway so you can breathe better. CPAP may be combined with other kinds of therapy for sleep apnea.      A mask over the nose gently directs air into the throat to keep the airway open.      Types of Air Pressure Treatments   There are different types of CPAP. Your doctor or CPAP technician will help you decide which type is best for you:   Basic CPAP keeps the pressure constant all night long.   A bilevel device gives out more pressure when you breathe in and less when you breathe out.   An autoCPAP device automatically adjusts pressure throughout the night and in response to changes such as body position, sleep stage, and snoring.      Please call office [phone] for any questions

## 2018-09-27 NOTE — PROGRESS NOTES
Sleep Apnea Consultation    Patient Iban Amos, referred by Dr. Cohen, for a consultation.      Vital Signs:   Vitals:    09/27/18 1348   BP: 122/80   Pulse: 65   Resp: 18        Chief Complaint:  Snoring.    History of Present Illness:  The patient is referred by Dr. Cohen for obstructive sleep apnea.  The patient is a diagnosis of obstructive sleep apnea.  Based on patient's recollection he has had at least 3 sleep studies.  Currently patient has 2 PAP machine .  Reason concern because of snoring at night.  [place] score 60  Bedtime is 930-10 p.m. wake-up time is 6:30 a.m..  Patient has no trouble falling asleep staying asleep and using the device.  Download of the device shin indicates that the all the machine he has is set at 8.5 cm water pressure.    This download was between November 2016 and December 2016  Residual AHI was 3.8 events per hour.  Using greater than 4 hr was 96.7%.  Patient currently works for the [employer]  No sleeping pills.  Comorbidities reviewed hyperlipidemia hypertension.    Discussed with patient that we will changes machine settings to auto PAP to allow it to explore higher pressures.  He is going to e-mail me with the subjective responses to the interactions with this machine.  Weight loss also would be beneficial.        Review of Systems: Review of Systems - General ROS: negative     Family History:   Family History   Problem Relation Age of Onset    Breast cancer Mother     Hypertension Father     Heart disease Father     Stroke Father     Diabetes Brother     Obesity Brother         Social History:   Social History     Substance and Sexual Activity   Alcohol Use Yes    Alcohol/week: 0.6 oz    Types: 1 Cans of beer per week   ,   Social History     Tobacco Use   Smoking Status Never Smoker   Smokeless Tobacco Never Used   ,   Social History     Substance and Sexual Activity   Drug Use No        Medications/Allergies:   Current Outpatient Medications:      "cetirizine (ZYRTEC) 10 MG tablet, Take 10 mg by mouth once daily., Disp: , Rfl:     lisinopril 10 MG tablet, Take 1 tablet (10 mg total) by mouth once daily., Disp: 90 tablet, Rfl: 4    naproxen sodium (ALEVE) 220 MG tablet, Take 220 mg by mouth every 12 (twelve) hours., Disp: , Rfl:     tamsulosin (FLOMAX) 0.4 mg Cap, Take 1 capsule (0.4 mg total) by mouth once daily., Disp: 30 capsule, Rfl: 11,   Review of patient's allergies indicates:   Allergen Reactions    Codeine Other (See Comments)     unknown    Pcn [penicillins] Other (See Comments)     unknown        Physical Examination:    GENERAL APPEARANCE:    He is a well-developed, well-nourished, 38 -year-old, white male in no apparent distress.    Communication ability is good.    External auditory canals and TMs are clear.  Hearing grossly intact.    External nose is normal.   Turbinates are 2 to 3+.    Lips, teeth, and gums are normal.  Oropharynx:  Tonsils are 2+.  Mucosa normal.    Malampati score 4  Palate shows good elevation.  He does have a moderately large palate.    Inspection of his head and face is normal.  He is nontender to palpation.    NECK: Neck exam - supple, no significant adenopathy.   NEck 16"    LYMPHATICS:  No nodes.    RESPIRATORY:  Chest: clear to auscultation, no wheezes, rales or rhonchi, symmetric air entry.     Download    11/27/2016 - 12/26/2016  YOB: 1980  Mask:  Compliance Summary  11/27/2016 - 12/26/2016 (30 days)  Days with Device Usage 30 days  Days without Device Usage 0 days  Percent Days with Device Usage 100.0%  Cumulative Usage 8 days 4 hrs. 10 mins. 48 secs.  Maximum Usage (1 Day) 9 hrs. 12 mins. 8 secs.  Average Usage (All Days) 6 hrs. 32 mins. 21 secs.  Average Usage (Days Used) 6 hrs. 32 mins. 21 secs.  Minimum Usage (1 Day) 9 secs.  Percent of Days with Usage >= 4 Hours 96.7%  Percent of Days with Usage < 4 Hours 3.3%  Date Range  Total Blower Time 8 days 4 hrs. 10 mins. 48 secs.  CPAP Summary " ([facility])  Average Time in Large Leak Per Day 0 secs.  Average AHI 3.8  CPAP 8.5 cmH2O    Impression:  This is a 38 y.o.-year-old, White male with obstructive sleep apnea on CPAP..  I have discussed options with him.  He is using the device we need to adjust his settings  He is going to see DME.  For this  I will see him back in 3 months    Problem List Items Addressed This Visit     BHAVIK on CPAP - Primary    Relevant Orders    MyChart Patient Entered CPAP Usage    HME - OTHER    Hyperlipidemia            Follow-up in about 3 months (around 12/27/2018) for Download CPAP/ APAP/ TRIOLOG/ BIPA, CPAP supplies, Weight loss and exercise, obtain prior records of.    This note was prepared using voice recognition system and is likely to have sound alike errors that may have been overlooked even after proof reading.  Please call me with any questions    Discussed diagnosis, its evaluation, treatment and usual course. All questions answered.    Thank you for the courtesy of participating in the care of this patient    Amadou Smiley MD

## 2018-09-27 NOTE — LETTER
September 27, 2018      Sylwia Cohen MD  [address]  [address]  [place] [address]           [facility]  [address]  [place] [address]  Phone: [phone]          Patient: Iban Amos   MR Number: [id number]   YOB: 1980   Date of Visit: 9/27/2018       Dear Dr. Sylwia Cohen:    Thank you for referring Iban Amos to me for evaluation. Attached you will find relevant portions of my assessment and plan of care.    If you have questions, please do not hesitate to call me. I look forward to following Iban Amos along with you.    Sincerely,    Amadou Smiley MD    Enclosure  CC:  No Recipients    If you would like to receive this communication electronically, please contact [email or web address] or [phone] to request more information on [employer] Link access.    For providers and/or their staff who would like to refer a patient to [facility], please contact us through our one-stop-shop provider referral line, [facility] , at [phone].    If you feel you have received this communication in error or would no longer like to receive these types of communications, please e-mail [email or web address]

## 2018-10-18 ENCOUNTER — PATIENT MESSAGE (OUTPATIENT)
Dept: INTERNAL MEDICINE | Facility: CLINIC | Age: 38
End: 2018-10-18

## 2018-10-18 DIAGNOSIS — G89.29 CHRONIC LOW BACK PAIN, UNSPECIFIED BACK PAIN LATERALITY, WITH SCIATICA PRESENCE UNSPECIFIED: Primary | ICD-10-CM

## 2018-10-18 DIAGNOSIS — M54.5 CHRONIC LOW BACK PAIN, UNSPECIFIED BACK PAIN LATERALITY, WITH SCIATICA PRESENCE UNSPECIFIED: Primary | ICD-10-CM

## 2018-10-19 ENCOUNTER — HOSPITAL ENCOUNTER (OUTPATIENT)
Dept: RADIOLOGY | Facility: HOSPITAL | Age: 38
Discharge: HOME | End: 2018-10-19
Attending: PHYSICIAN ASSISTANT
Payer: COMMERCIAL

## 2018-10-19 ENCOUNTER — OFFICE VISIT (OUTPATIENT)
Dept: INTERNAL MEDICINE | Facility: CLINIC | Age: 38
End: 2018-10-19
Payer: COMMERCIAL

## 2018-10-19 VITALS
HEART RATE: 68 BPM | DIASTOLIC BLOOD PRESSURE: 82 MMHG | TEMPERATURE: 99 F | WEIGHT: 178.81 LBS | BODY MASS INDEX: 28.07 KG/M2 | SYSTOLIC BLOOD PRESSURE: 118 MMHG | HEIGHT: 67 IN

## 2018-10-19 DIAGNOSIS — M54.50 CHRONIC LEFT-SIDED LOW BACK PAIN WITHOUT SCIATICA: Primary | ICD-10-CM

## 2018-10-19 DIAGNOSIS — M54.50 CHRONIC LEFT-SIDED LOW BACK PAIN WITHOUT SCIATICA: ICD-10-CM

## 2018-10-19 DIAGNOSIS — G89.29 CHRONIC LEFT-SIDED LOW BACK PAIN WITHOUT SCIATICA: ICD-10-CM

## 2018-10-19 DIAGNOSIS — G89.29 CHRONIC LEFT-SIDED LOW BACK PAIN WITHOUT SCIATICA: Primary | ICD-10-CM

## 2018-10-19 PROCEDURE — 72100 X-RAY EXAM L-S SPINE 2/3 VWS: CPT | Mod: 26,,, | Performed by: RADIOLOGY

## 2018-10-19 PROCEDURE — 3008F BODY MASS INDEX DOCD: CPT | Mod: CPTII,S$GLB,, | Performed by: PHYSICIAN ASSISTANT

## 2018-10-19 PROCEDURE — 72100 X-RAY EXAM L-S SPINE 2/3 VWS: CPT | Mod: TC,FY,PO

## 2018-10-19 PROCEDURE — 99999 PR PBB SHADOW E&M-EST. PATIENT-LVL III: CPT | Mod: PBBFAC,,, | Performed by: PHYSICIAN ASSISTANT

## 2018-10-19 PROCEDURE — 3079F DIAST BP 80-89 MM HG: CPT | Mod: CPTII,S$GLB,, | Performed by: PHYSICIAN ASSISTANT

## 2018-10-19 PROCEDURE — 99213 OFFICE O/P EST LOW 20 MIN: CPT | Mod: S$GLB,,, | Performed by: PHYSICIAN ASSISTANT

## 2018-10-19 PROCEDURE — 3074F SYST BP LT 130 MM HG: CPT | Mod: CPTII,S$GLB,, | Performed by: PHYSICIAN ASSISTANT

## 2018-10-19 RX ORDER — CELECOXIB 100 MG/1
100 CAPSULE ORAL 2 TIMES DAILY
Qty: 30 CAPSULE | Refills: 0 | Status: SHIPPED | OUTPATIENT
Start: 2018-10-19 | End: 2018-11-03

## 2018-10-19 NOTE — PROGRESS NOTES
"Subjective:       Patient ID: Iban Amos is a 38 y.o. male.    Chief Complaint: Low-back Pain and Hip Pain    HPI  Patient comes in today for complaint of LBP that is acute on chronic and now some pain going into thigh and  Buttocks   He did go to a P.T. appt yesterday and did get some relief but not as much as expected.     Worse with sitting positions such as driving     No weakness or LROM   Some tingling down leg on left side, stops at knee - lateral mainly   Health Maintenance Due   Topic Date Due    Influenza Vaccine  08/01/2018    Lipid Panel  09/26/2018       Past Medical History:   Diagnosis Date    Sleep apnea     Unspecified essential hypertension        Current Outpatient Medications   Medication Sig Dispense Refill    cetirizine (ZYRTEC) 10 MG tablet Take 10 mg by mouth once daily.      lisinopril 10 MG tablet Take 1 tablet (10 mg total) by mouth once daily. 90 tablet 4    tamsulosin (FLOMAX) 0.4 mg Cap Take 1 capsule (0.4 mg total) by mouth once daily. 30 capsule 11    celecoxib (CELEBREX) 100 MG capsule Take 1 capsule (100 mg total) by mouth 2 (two) times daily. for 15 days 30 capsule 0     No current facility-administered medications for this visit.        Review of Systems    Objective:   /82   Pulse 68   Temp 99 °F (37.2 °C) (Tympanic)   Ht 5' 7" (1.702 m)   Wt 81.1 kg (178 lb 12.7 oz)   BMI 28.00 kg/m²      Physical Exam   Constitutional: He appears well-developed and well-nourished.   HENT:   Head: Normocephalic and atraumatic.   Eyes: Pupils are equal, round, and reactive to light.   Cardiovascular: Exam reveals no gallop and no friction rub.   No murmur heard.  Musculoskeletal: Normal range of motion.   No spinous process tenderness is noted.  Mild tenderness to palpation in the superior gluteal lower lumbar region on the left-hand side.     Vitals reviewed.        Lab Results   Component Value Date    WBC 8.31 09/26/2017    WBC 8.31 09/26/2017    HGB 15.7 " 09/26/2017    HGB 15.7 09/26/2017    HCT 44.7 09/26/2017    HCT 44.7 09/26/2017     09/26/2017     09/26/2017    CHOL 213 (H) 09/26/2017    TRIG 160 (H) 09/26/2017    HDL 46 09/26/2017    ALT 54 (H) 09/26/2017    AST 28 09/26/2017     09/26/2017    K 3.7 09/26/2017     09/26/2017    CREATININE 0.9 09/26/2017    BUN 15 09/26/2017    CO2 28 09/26/2017    TSH 1.171 09/26/2017    TSH 1.171 09/26/2017    HGBA1C 4.9 09/26/2017       Assessment:       1. Chronic left-sided low back pain without sciatica        Plan:   Chronic left-sided low back pain without sciatica  -     X-Ray Lumbar Spine AP And Lateral; Future; Expected date: 10/19/2018    Other orders  -     celecoxib (CELEBREX) 100 MG capsule; Take 1 capsule (100 mg total) by mouth 2 (two) times daily. for 15 days  Dispense: 30 capsule; Refill: 0    Patient tried P.T.   Will get xray and compare from 2013 images   Start NSAIDs, rest back today and resume P.T.   Also offered referral to healthy back with [facility] but he would like to try NSAIDs first.     Also suspect some meralgia paresthetica   Patient does wear belts daily, recommended stretching and loose pants   No Follow-up on file.

## 2018-10-20 ENCOUNTER — PATIENT MESSAGE (OUTPATIENT)
Dept: INTERNAL MEDICINE | Facility: CLINIC | Age: 38
End: 2018-10-20

## 2018-10-22 ENCOUNTER — PATIENT MESSAGE (OUTPATIENT)
Dept: INTERNAL MEDICINE | Facility: CLINIC | Age: 38
End: 2018-10-22

## 2018-10-22 ENCOUNTER — CLINICAL SUPPORT (OUTPATIENT)
Dept: INTERNAL MEDICINE | Facility: CLINIC | Age: 38
End: 2018-10-22
Payer: COMMERCIAL

## 2018-10-22 PROCEDURE — 99999 PR PBB SHADOW E&M-EST. PATIENT-LVL I: CPT | Mod: PBBFAC,,,

## 2018-10-22 PROCEDURE — 96372 THER/PROPH/DIAG INJ SC/IM: CPT | Mod: S$GLB,,, | Performed by: PHYSICIAN ASSISTANT

## 2018-10-22 RX ORDER — METHYLPREDNISOLONE ACETATE 40 MG/ML
60 INJECTION, SUSPENSION INTRA-ARTICULAR; INTRALESIONAL; INTRAMUSCULAR; SOFT TISSUE
Status: COMPLETED | OUTPATIENT
Start: 2018-10-22 | End: 2018-10-22

## 2018-10-22 RX ADMIN — METHYLPREDNISOLONE ACETATE 60 MG: 40 INJECTION, SUSPENSION INTRA-ARTICULAR; INTRALESIONAL; INTRAMUSCULAR; SOFT TISSUE at 11:10

## 2018-10-22 NOTE — PROGRESS NOTES
Pt reported to clinic today for steroid injection. Identified pt using two pt identifiers. Allergies and medications verified with pt. Per order Methylprednisolone 60mg IM given to pt in left ventrogluteal. Pt tolerated well and was advised to wait in the lobby for 15 minutes. Pt verbalized understanding.

## 2018-10-23 ENCOUNTER — TELEPHONE (OUTPATIENT)
Dept: INTERNAL MEDICINE | Facility: CLINIC | Age: 38
End: 2018-10-23

## 2018-10-23 RX ORDER — METAXALONE 800 MG/1
800 TABLET ORAL 3 TIMES DAILY
Qty: 30 TABLET | Refills: 1 | Status: SHIPPED | OUTPATIENT
Start: 2018-10-23 | End: 2018-11-02

## 2018-10-23 RX ORDER — CYCLOBENZAPRINE HCL 10 MG
10 TABLET ORAL NIGHTLY
Qty: 30 TABLET | Refills: 0 | Status: SHIPPED | OUTPATIENT
Start: 2018-10-23 | End: 2018-11-02

## 2018-10-23 RX ORDER — METHYLPREDNISOLONE 4 MG/1
TABLET ORAL
Qty: 1 PACKAGE | Refills: 0 | Status: SHIPPED | OUTPATIENT
Start: 2018-10-23 | End: 2019-04-03

## 2018-10-23 NOTE — TELEPHONE ENCOUNTER
Sent in flexeril, skelaxin, and medrol dose pack. Keep celebrex at 100mg bid with food while on medrol then can increase celebrex if needed after medrol dose pack. Add heat and stretches for back. followup if not improving in 4-7 days, with me.

## 2018-11-09 ENCOUNTER — LAB VISIT (OUTPATIENT)
Dept: LAB | Facility: HOSPITAL | Age: 38
End: 2018-11-09
Attending: FAMILY MEDICINE
Payer: COMMERCIAL

## 2018-11-09 DIAGNOSIS — Z00.00 BLOOD TESTS FOR ROUTINE GENERAL PHYSICAL EXAMINATION: ICD-10-CM

## 2018-11-09 DIAGNOSIS — I10 ESSENTIAL HYPERTENSION: ICD-10-CM

## 2018-11-09 DIAGNOSIS — E78.5 HYPERLIPIDEMIA, UNSPECIFIED HYPERLIPIDEMIA TYPE: ICD-10-CM

## 2018-11-09 LAB
ALBUMIN SERPL BCP-MCNC: 4.1 G/DL
ALP SERPL-CCNC: 79 U/L
ALT SERPL W/O P-5'-P-CCNC: 35 U/L
ANION GAP SERPL CALC-SCNC: 9 MMOL/L
AST SERPL-CCNC: 21 U/L
BASOPHILS # BLD AUTO: 0.05 K/UL
BASOPHILS NFR BLD: 0.7 %
BILIRUB SERPL-MCNC: 0.4 MG/DL
BUN SERPL-MCNC: 14 MG/DL
CALCIUM SERPL-MCNC: 9.2 MG/DL
CHLORIDE SERPL-SCNC: 103 MMOL/L
CHOLEST SERPL-MCNC: 218 MG/DL
CHOLEST/HDLC SERPL: 4 {RATIO}
CO2 SERPL-SCNC: 28 MMOL/L
CREAT SERPL-MCNC: 0.8 MG/DL
DIFFERENTIAL METHOD: ABNORMAL
EOSINOPHIL # BLD AUTO: 0.3 K/UL
EOSINOPHIL NFR BLD: 4.8 %
ERYTHROCYTE [DISTWIDTH] IN BLOOD BY AUTOMATED COUNT: 12.4 %
EST. GFR  (AFRICAN AMERICAN): >60 ML/MIN/1.73 M^2
EST. GFR  (NON AFRICAN AMERICAN): >60 ML/MIN/1.73 M^2
GLUCOSE SERPL-MCNC: 84 MG/DL
HCT VFR BLD AUTO: 45.6 %
HDLC SERPL-MCNC: 55 MG/DL
HDLC SERPL: 25.2 %
HGB BLD-MCNC: 15.4 G/DL
IMM GRANULOCYTES # BLD AUTO: 0.02 K/UL
IMM GRANULOCYTES NFR BLD AUTO: 0.3 %
LDLC SERPL CALC-MCNC: 135.2 MG/DL
LYMPHOCYTES # BLD AUTO: 2.4 K/UL
LYMPHOCYTES NFR BLD: 34.3 %
MCH RBC QN AUTO: 32.4 PG
MCHC RBC AUTO-ENTMCNC: 33.8 G/DL
MCV RBC AUTO: 96 FL
MONOCYTES # BLD AUTO: 0.6 K/UL
MONOCYTES NFR BLD: 8.7 %
NEUTROPHILS # BLD AUTO: 3.5 K/UL
NEUTROPHILS NFR BLD: 51.2 %
NONHDLC SERPL-MCNC: 163 MG/DL
NRBC BLD-RTO: 0 /100 WBC
PLATELET # BLD AUTO: 175 K/UL
PMV BLD AUTO: 11.2 FL
POTASSIUM SERPL-SCNC: 4.2 MMOL/L
PROT SERPL-MCNC: 7.2 G/DL
RBC # BLD AUTO: 4.76 M/UL
SODIUM SERPL-SCNC: 140 MMOL/L
TRIGL SERPL-MCNC: 139 MG/DL
TSH SERPL DL<=0.005 MIU/L-ACNC: 0.85 UIU/ML
WBC # BLD AUTO: 6.91 K/UL

## 2018-11-09 PROCEDURE — 85025 COMPLETE CBC W/AUTO DIFF WBC: CPT

## 2018-11-09 PROCEDURE — 80061 LIPID PANEL: CPT

## 2018-11-09 PROCEDURE — 84443 ASSAY THYROID STIM HORMONE: CPT

## 2018-11-09 PROCEDURE — 80053 COMPREHEN METABOLIC PANEL: CPT

## 2018-11-09 PROCEDURE — 36415 COLL VENOUS BLD VENIPUNCTURE: CPT | Mod: PO

## 2019-04-03 ENCOUNTER — OFFICE VISIT (OUTPATIENT)
Dept: INTERNAL MEDICINE | Facility: CLINIC | Age: 39
End: 2019-04-03
Payer: COMMERCIAL

## 2019-04-03 ENCOUNTER — LAB VISIT (OUTPATIENT)
Dept: LAB | Facility: HOSPITAL | Age: 39
End: 2019-04-03
Attending: FAMILY MEDICINE
Payer: COMMERCIAL

## 2019-04-03 VITALS
HEART RATE: 72 BPM | SYSTOLIC BLOOD PRESSURE: 120 MMHG | DIASTOLIC BLOOD PRESSURE: 84 MMHG | TEMPERATURE: 97 F | BODY MASS INDEX: 28.65 KG/M2 | HEIGHT: 67 IN | WEIGHT: 182.56 LBS

## 2019-04-03 DIAGNOSIS — I10 ESSENTIAL HYPERTENSION: ICD-10-CM

## 2019-04-03 DIAGNOSIS — G44.83 HEADACHE, PRIMARY COUGH: ICD-10-CM

## 2019-04-03 DIAGNOSIS — Z87.828 HISTORY OF TRAUMATIC HEAD INJURY: ICD-10-CM

## 2019-04-03 DIAGNOSIS — G44.53 THUNDERCLAP HEADACHE: ICD-10-CM

## 2019-04-03 DIAGNOSIS — G44.52 NEW PERSISTENT DAILY HEADACHE: ICD-10-CM

## 2019-04-03 DIAGNOSIS — G44.52 NEW PERSISTENT DAILY HEADACHE: Primary | ICD-10-CM

## 2019-04-03 LAB
ANION GAP SERPL CALC-SCNC: 7 MMOL/L (ref 8–16)
BUN SERPL-MCNC: 14 MG/DL (ref 6–20)
CALCIUM SERPL-MCNC: 9.5 MG/DL (ref 8.7–10.5)
CHLORIDE SERPL-SCNC: 102 MMOL/L (ref 95–110)
CO2 SERPL-SCNC: 27 MMOL/L (ref 23–29)
CREAT SERPL-MCNC: 0.8 MG/DL (ref 0.5–1.4)
EST. GFR  (AFRICAN AMERICAN): >60 ML/MIN/1.73 M^2
EST. GFR  (NON AFRICAN AMERICAN): >60 ML/MIN/1.73 M^2
GLUCOSE SERPL-MCNC: 92 MG/DL (ref 70–110)
POTASSIUM SERPL-SCNC: 3.9 MMOL/L (ref 3.5–5.1)
SODIUM SERPL-SCNC: 136 MMOL/L (ref 136–145)

## 2019-04-03 PROCEDURE — 99999 PR PBB SHADOW E&M-EST. PATIENT-LVL III: ICD-10-PCS | Mod: PBBFAC,,, | Performed by: FAMILY MEDICINE

## 2019-04-03 PROCEDURE — 3008F BODY MASS INDEX DOCD: CPT | Mod: CPTII,S$GLB,, | Performed by: FAMILY MEDICINE

## 2019-04-03 PROCEDURE — 3079F DIAST BP 80-89 MM HG: CPT | Mod: CPTII,S$GLB,, | Performed by: FAMILY MEDICINE

## 2019-04-03 PROCEDURE — 3074F PR MOST RECENT SYSTOLIC BLOOD PRESSURE < 130 MM HG: ICD-10-PCS | Mod: CPTII,S$GLB,, | Performed by: FAMILY MEDICINE

## 2019-04-03 PROCEDURE — 99215 PR OFFICE/OUTPT VISIT, EST, LEVL V, 40-54 MIN: ICD-10-PCS | Mod: S$GLB,,, | Performed by: FAMILY MEDICINE

## 2019-04-03 PROCEDURE — 3008F PR BODY MASS INDEX (BMI) DOCUMENTED: ICD-10-PCS | Mod: CPTII,S$GLB,, | Performed by: FAMILY MEDICINE

## 2019-04-03 PROCEDURE — 99215 OFFICE O/P EST HI 40 MIN: CPT | Mod: S$GLB,,, | Performed by: FAMILY MEDICINE

## 2019-04-03 PROCEDURE — 36415 COLL VENOUS BLD VENIPUNCTURE: CPT | Mod: PO

## 2019-04-03 PROCEDURE — 99999 PR PBB SHADOW E&M-EST. PATIENT-LVL III: CPT | Mod: PBBFAC,,, | Performed by: FAMILY MEDICINE

## 2019-04-03 PROCEDURE — 3079F PR MOST RECENT DIASTOLIC BLOOD PRESSURE 80-89 MM HG: ICD-10-PCS | Mod: CPTII,S$GLB,, | Performed by: FAMILY MEDICINE

## 2019-04-03 PROCEDURE — 3074F SYST BP LT 130 MM HG: CPT | Mod: CPTII,S$GLB,, | Performed by: FAMILY MEDICINE

## 2019-04-03 PROCEDURE — 80048 BASIC METABOLIC PNL TOTAL CA: CPT

## 2019-04-03 RX ORDER — SUMATRIPTAN SUCCINATE 100 MG/1
100 TABLET ORAL
Qty: 10 TABLET | Refills: 0 | Status: SHIPPED | OUTPATIENT
Start: 2019-04-03 | End: 2020-09-28

## 2019-04-03 RX ORDER — OLMESARTAN MEDOXOMIL 20 MG/1
20 TABLET ORAL DAILY
Qty: 90 TABLET | Refills: 3 | Status: SHIPPED | OUTPATIENT
Start: 2019-04-03 | End: 2020-04-17 | Stop reason: SDUPTHER
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
## 2019-04-04 ENCOUNTER — TELEPHONE (OUTPATIENT)
Dept: INTERNAL MEDICINE | Facility: CLINIC | Age: 39
End: 2019-04-04

## 2019-04-04 ENCOUNTER — PATIENT MESSAGE (OUTPATIENT)
Dept: INTERNAL MEDICINE | Facility: CLINIC | Age: 39
End: 2019-04-04

## 2019-04-04 RX ORDER — VERAPAMIL HYDROCHLORIDE 80 MG/1
TABLET ORAL
Qty: 90 TABLET | Refills: 11 | Status: SHIPPED | OUTPATIENT
Start: 2019-04-04 | End: 2020-04-17 | Stop reason: SDUPTHER

## 2019-04-18 ENCOUNTER — TELEPHONE (OUTPATIENT)
Dept: RADIOLOGY | Facility: HOSPITAL | Age: 39
End: 2019-04-18

## 2019-04-24 NOTE — PATIENT INSTRUCTIONS
immitrex tonight with 2 aleve liquid gels., let me know if this helps.        26 yo , POD#4 s/p rLTCS and b/l tubal ligation. PMSH significant for polycystic kidney disease s/p L nephrectomy (), and Graves Dz s/p thyroidectomy, presents wit 1 day of fever, chills, rigors. Pt was discharged home on . At 5pm, pt began feeling chills and body aches and took her oral temperature at home. Tmax 100.7. She also endorses strong uterine cramping that is more intense compared to her previous deliveries. Pt states she has normal post-op abdominal pain and no drainage or redness at incision site. Pt states she began having dysuria this AM that's burning in nature. Denies N/V, CP, SOB, abdnormal vaginal bleeding or discharge.    In ED, pt intially tachycardic to 117,which resolved with 1L NS bolus. She also received clindamycin x 1. Tmax 99. 1 rectally, WBC 24.16. H/H 11.8/37.5. Lactate 1. UA: large LE, >50 RBC, >50 WBC. TVUS significant for a tubular hypoechoic and avascular collection adjacent to the right ovary, measuring approximately 8.0 x 3.2 x 2.9 cm, in the context of patient's recent tubal ligation this may represent hydrosalpinx or seroma. Within the subcutaneous tissue anterior to the uterus and bladder, there is a ill-defined, hypoechoic, avascular collection measuring 7.2 x 6.6 x 1.3 cm, likely postoperative seroma from patient's recent .         OB/GYN HISTORY:   [facility]N=[facility]  G1- SAB   G2- 6#10  G3-pLTCS for arrest of dilation 8#1, postop course c/b post-op wound infection  G4-rLTCS     Past Medical History:    (normal spontaneous vaginal delivery)  Graves disease   Polycystic kidney        Surgical History:    History of incision and drainage  History of  section  H/O thyroidectomy   H/O left nephrectomy      Medications:  acetaminophen   Tablet .. 975 milliGRAM(s) Oral every 6 hours PRN  ferrous    sulfate 325 milliGRAM(s) Oral daily  folic acid 1 milliGRAM(s) Oral daily  levothyroxine 200 MICROGram(s) Oral daily  oxyCODONE    IR 5 milliGRAM(s) Oral every 4 hours PRN  piperacillin/tazobactam IVPB. 3.375 Gram(s) IV Intermittent every 8 hours  prenatal multivitamin 1 Tablet(s) Oral daily  simethicone 80 milliGRAM(s) Chew two times a day PRN  sodium chloride 0.9%. 1000 milliLiter(s) IV Continuous <Continuous>      Allergies  No Known Allergies      Social History:   denies    Psych History:  denies    REVIEW OF SYSTEMS: negative, except stated in HPI

## 2019-08-16 ENCOUNTER — PATIENT MESSAGE (OUTPATIENT)
Dept: INTERNAL MEDICINE | Facility: CLINIC | Age: 39
End: 2019-08-16

## 2019-08-16 DIAGNOSIS — G47.33 OBSTRUCTIVE SLEEP APNEA ON CPAP: Primary | ICD-10-CM

## 2019-08-21 ENCOUNTER — PATIENT MESSAGE (OUTPATIENT)
Dept: INTERNAL MEDICINE | Facility: CLINIC | Age: 39
End: 2019-08-21

## 2019-10-09 RX ORDER — TAMSULOSIN HYDROCHLORIDE 0.4 MG/1
0.4 CAPSULE ORAL DAILY
Qty: 30 CAPSULE | Refills: 11 | Status: SHIPPED | OUTPATIENT
Start: 2019-10-09 | End: 2020-09-28

## 2019-10-14 ENCOUNTER — PATIENT MESSAGE (OUTPATIENT)
Dept: INTERNAL MEDICINE | Facility: CLINIC | Age: 39
End: 2019-10-14

## 2019-10-14 DIAGNOSIS — N40.0 BENIGN PROSTATIC HYPERPLASIA, UNSPECIFIED WHETHER LOWER URINARY TRACT SYMPTOMS PRESENT: Primary | ICD-10-CM

## 2019-12-30 ENCOUNTER — PATIENT MESSAGE (OUTPATIENT)
Dept: INTERNAL MEDICINE | Facility: CLINIC | Age: 39
End: 2019-12-30

## 2020-04-17 ENCOUNTER — PATIENT MESSAGE (OUTPATIENT)
Dept: INTERNAL MEDICINE | Facility: CLINIC | Age: 40
End: 2020-04-17

## 2020-04-17 RX ORDER — OLMESARTAN MEDOXOMIL 20 MG/1
20 TABLET ORAL DAILY
Qty: 90 TABLET | Refills: 3 | Status: SHIPPED | OUTPATIENT
Start: 2020-04-17 | End: 2021-04-14

## 2020-04-17 RX ORDER — VERAPAMIL HYDROCHLORIDE 80 MG/1
TABLET ORAL
Qty: 90 TABLET | Refills: 11 | Status: SHIPPED | OUTPATIENT
Start: 2020-04-17 | End: 2020-09-28

## 2020-09-10 ENCOUNTER — PATIENT MESSAGE (OUTPATIENT)
Dept: PRIMARY CARE CLINIC | Facility: CLINIC | Age: 40
End: 2020-09-10

## 2020-09-11 NOTE — TELEPHONE ENCOUNTER
Called patient and he advised that he will call if he need to schedule a virtual visit for return to work letter.

## 2020-09-25 NOTE — PROGRESS NOTES
Subjective:       Patient ID: Iban Amos is a 40 y.o. male.    Chief Complaint: Annual Exam    Iban Amos is a 40 y.o. male who presents today for a wellness exam. He is doing well.   Not currently have any use Flomax.  Migraines are actually well controlled.  Not using Imitrex at this time.  Blood pressure is controlled with Benicar 20 mg.  Still uses CPAP at night with a nasal peace.  Mainly gets his supplies through [employer].  Has noticed some issues with opening his mouth at night does have a chin strap but may need some new mask features.      Willing to do lab work today.  Will receive flu shot through work.  Occasionally with back spasms but stable this time.    Lab Results       Component                Value               Date                       HGBA1C                   4.9                 09/26/2017             Lab Results       Component                Value               Date                       CHOL                     218 (H)             11/09/2018                 CHOL                     213 (H)             09/26/2017                 CHOL                     217 (H)             05/12/2015            Lab Results       Component                Value               Date                       LDLCALC                  135.2               11/09/2018                 LDLCALC                  135.0               09/26/2017                 LDLCALC                  140.6               05/12/2015              Wt Readings from Last 10 Encounters:  09/29/20 : 82.6 kg (181 lb 15.8 oz)  04/03/19 : 82.8 kg (182 lb 8.7 oz)  10/19/18 : 81.1 kg (178 lb 12.7 oz)  09/27/18 : 83.7 kg (184 lb 8.4 oz)  09/21/18 : 83.2 kg (183 lb 6.8 oz)  09/04/18 : 82.8 kg (182 lb 8.7 oz)  03/28/18 : 81.3 kg (179 lb 3.7 oz)  09/26/17 : 81 kg (178 lb 9.2 oz)  09/13/17 : 81.7 kg (180 lb 1.9 oz)  07/06/16 : 77.4 kg (170 lb 10.2 oz)      The 10-year ASCVD risk score (Blayneamara SWAIN Jr., et al., 2013) is: 1.6%    Values used to  calculate the score:      Age: 40 years      Sex: Male      Is Non- : No      Diabetic: No      Tobacco smoker: No      Systolic Blood Pressure: 122 mmHg      Is BP treated: Yes      HDL Cholesterol: 55 mg/dL      Total Cholesterol: 218 mg/dL        Review of Systems   Constitutional: Negative for activity change and unexpected weight change.   HENT: Negative for hearing loss, rhinorrhea and trouble swallowing.    Eyes: Negative for discharge and visual disturbance.   Respiratory: Negative for chest tightness and wheezing.    Cardiovascular: Negative for chest pain and palpitations.   Gastrointestinal: Negative for blood in stool, constipation, diarrhea and vomiting.   Endocrine: Negative for polydipsia and polyuria.   Genitourinary: Negative for difficulty urinating, hematuria and urgency.   Musculoskeletal: Negative for arthralgias, joint swelling and neck pain.   Neurological: Negative for weakness and headaches.   Psychiatric/Behavioral: Negative for confusion and dysphoric mood.         Objective:      Physical Exam  Vitals signs reviewed.   Constitutional:       General: He is not in acute distress.     Appearance: Normal appearance. He is well-developed and normal weight.   HENT:      Head: Normocephalic and atraumatic.      Right Ear: Tympanic membrane and external ear normal.      Left Ear: Tympanic membrane and external ear normal.      Nose: Nose normal.      Mouth/Throat:      Mouth: Mucous membranes are moist.      Pharynx: Oropharynx is clear.   Eyes:      Conjunctiva/sclera: Conjunctivae normal.      Pupils: Pupils are equal, round, and reactive to light.   Neck:      Musculoskeletal: Normal range of motion and neck supple.      Thyroid: No thyromegaly.   Cardiovascular:      Rate and Rhythm: Normal rate and regular rhythm.      Heart sounds: No murmur. No friction rub. No gallop.    Pulmonary:      Effort: Pulmonary effort is normal. No respiratory distress.      Breath  sounds: Normal breath sounds.   Abdominal:      General: Bowel sounds are normal. There is no distension.      Palpations: Abdomen is soft.      Tenderness: There is no abdominal tenderness. There is no rebound.   Musculoskeletal: Normal range of motion.   Lymphadenopathy:      Cervical: No cervical adenopathy.   Skin:     General: Skin is warm and dry.   Neurological:      General: No focal deficit present.      Mental Status: He is alert and oriented to person, place, and time.      Coordination: Coordination normal.   Psychiatric:         Attention and Perception: Attention normal.         Mood and Affect: Mood and affect normal.         Speech: Speech normal.         Behavior: Behavior normal.         Thought Content: Thought content normal.         Cognition and Memory: Cognition normal.         Judgment: Judgment normal.         Assessment:       1. Routine general medical examination at a health care facility    2. Essential hypertension    3. BHAVIK on CPAP    4. Hyperlipidemia, unspecified hyperlipidemia type    5. Benign prostatic hyperplasia with urinary obstruction    6. Prostate cancer screening        Plan:         Iban Suarez was seen today for annual exam.    Diagnoses and all orders for this visit:    Routine general medical examination at a health care facility  Comments:  Labs today flu shot through work.  Discussed health maintenance  Orders:  -     TSH; Future  -     Lipid Panel; Future  -     Hemoglobin A1C; Future  -     Comprehensive metabolic panel; Future  -     CBC auto differential; Future  -     PSA, Screening; Future  -     Microalbumin/creatinine urine ratio; Future  -     Hepatitis C Antibody; Future  -     HIV 1/2 Ag/Ab (4th Gen); Future    Essential hypertension  Comments:  -stable condition.  Continue with current medications and interventions. Labs reviewed.   Orders:  -     TSH; Future  -     Lipid Panel; Future  -     Hemoglobin A1C; Future  -     Comprehensive metabolic panel;  Future  -     CBC auto differential; Future  -     Microalbumin/creatinine urine ratio; Future    BHAVIK on CPAP  Comments:  -stable condition.  Continue with current medications and interventions. Labs reviewed.   Orders:  -     TSH; Future  -     Lipid Panel; Future  -     Hemoglobin A1C; Future  -     Comprehensive metabolic panel; Future  -     CBC auto differential; Future  -     Microalbumin/creatinine urine ratio; Future    Hyperlipidemia, unspecified hyperlipidemia type  Comments:  -stable condition.  Continue with current medications and interventions. Labs reviewed.   Orders:  -     TSH; Future  -     Lipid Panel; Future  -     Hemoglobin A1C; Future  -     Comprehensive metabolic panel; Future  -     CBC auto differential; Future  -     Microalbumin/creatinine urine ratio; Future    Benign prostatic hyperplasia with urinary obstruction  Comments:  resolved    Prostate cancer screening  -     PSA, Screening; Future

## 2020-09-29 ENCOUNTER — LAB VISIT (OUTPATIENT)
Dept: LAB | Facility: HOSPITAL | Age: 40
End: 2020-09-29
Attending: FAMILY MEDICINE
Payer: COMMERCIAL

## 2020-09-29 ENCOUNTER — OFFICE VISIT (OUTPATIENT)
Dept: PRIMARY CARE CLINIC | Facility: CLINIC | Age: 40
End: 2020-09-29
Payer: COMMERCIAL

## 2020-09-29 VITALS
BODY MASS INDEX: 28.56 KG/M2 | SYSTOLIC BLOOD PRESSURE: 122 MMHG | WEIGHT: 182 LBS | TEMPERATURE: 98 F | HEART RATE: 74 BPM | DIASTOLIC BLOOD PRESSURE: 80 MMHG | HEIGHT: 67 IN

## 2020-09-29 DIAGNOSIS — Z12.5 PROSTATE CANCER SCREENING: ICD-10-CM

## 2020-09-29 DIAGNOSIS — G47.33 OSA ON CPAP: ICD-10-CM

## 2020-09-29 DIAGNOSIS — Z00.00 ROUTINE GENERAL MEDICAL EXAMINATION AT A HEALTH CARE FACILITY: ICD-10-CM

## 2020-09-29 DIAGNOSIS — I10 ESSENTIAL HYPERTENSION: ICD-10-CM

## 2020-09-29 DIAGNOSIS — Z00.00 ROUTINE GENERAL MEDICAL EXAMINATION AT A HEALTH CARE FACILITY: Primary | ICD-10-CM

## 2020-09-29 DIAGNOSIS — E78.5 HYPERLIPIDEMIA, UNSPECIFIED HYPERLIPIDEMIA TYPE: ICD-10-CM

## 2020-09-29 DIAGNOSIS — N40.1 BENIGN PROSTATIC HYPERPLASIA WITH URINARY OBSTRUCTION: ICD-10-CM

## 2020-09-29 DIAGNOSIS — N13.8 BENIGN PROSTATIC HYPERPLASIA WITH URINARY OBSTRUCTION: ICD-10-CM

## 2020-09-29 LAB
BASOPHILS # BLD AUTO: 0.07 K/UL (ref 0–0.2)
BASOPHILS NFR BLD: 0.9 % (ref 0–1.9)
DIFFERENTIAL METHOD: ABNORMAL
EOSINOPHIL # BLD AUTO: 0.4 K/UL (ref 0–0.5)
EOSINOPHIL NFR BLD: 4.8 % (ref 0–8)
ERYTHROCYTE [DISTWIDTH] IN BLOOD BY AUTOMATED COUNT: 12.3 % (ref 11.5–14.5)
HCT VFR BLD AUTO: 51.2 % (ref 40–54)
HGB BLD-MCNC: 17.3 G/DL (ref 14–18)
IMM GRANULOCYTES # BLD AUTO: 0.04 K/UL (ref 0–0.04)
IMM GRANULOCYTES NFR BLD AUTO: 0.5 % (ref 0–0.5)
LYMPHOCYTES # BLD AUTO: 3.1 K/UL (ref 1–4.8)
LYMPHOCYTES NFR BLD: 37.6 % (ref 18–48)
MCH RBC QN AUTO: 31.7 PG (ref 27–31)
MCHC RBC AUTO-ENTMCNC: 33.8 G/DL (ref 32–36)
MCV RBC AUTO: 94 FL (ref 82–98)
MONOCYTES # BLD AUTO: 1 K/UL (ref 0.3–1)
MONOCYTES NFR BLD: 12.3 % (ref 4–15)
NEUTROPHILS # BLD AUTO: 3.6 K/UL (ref 1.8–7.7)
NEUTROPHILS NFR BLD: 43.9 % (ref 38–73)
NRBC BLD-RTO: 0 /100 WBC
PLATELET # BLD AUTO: 201 K/UL (ref 150–350)
PMV BLD AUTO: 11.8 FL (ref 9.2–12.9)
RBC # BLD AUTO: 5.46 M/UL (ref 4.6–6.2)
WBC # BLD AUTO: 8.13 K/UL (ref 3.9–12.7)

## 2020-09-29 PROCEDURE — 3008F BODY MASS INDEX DOCD: CPT | Mod: CPTII,S$GLB,, | Performed by: FAMILY MEDICINE

## 2020-09-29 PROCEDURE — 80053 COMPREHEN METABOLIC PANEL: CPT

## 2020-09-29 PROCEDURE — 84153 ASSAY OF PSA TOTAL: CPT

## 2020-09-29 PROCEDURE — 86703 HIV-1/HIV-2 1 RESULT ANTBDY: CPT

## 2020-09-29 PROCEDURE — 99396 PREV VISIT EST AGE 40-64: CPT | Mod: S$GLB,,, | Performed by: FAMILY MEDICINE

## 2020-09-29 PROCEDURE — 83036 HEMOGLOBIN GLYCOSYLATED A1C: CPT

## 2020-09-29 PROCEDURE — 86803 HEPATITIS C AB TEST: CPT

## 2020-09-29 PROCEDURE — 36415 COLL VENOUS BLD VENIPUNCTURE: CPT | Mod: PN

## 2020-09-29 PROCEDURE — 99999 PR PBB SHADOW E&M-EST. PATIENT-LVL III: CPT | Mod: PBBFAC,,, | Performed by: FAMILY MEDICINE

## 2020-09-29 PROCEDURE — 3074F SYST BP LT 130 MM HG: CPT | Mod: CPTII,S$GLB,, | Performed by: FAMILY MEDICINE

## 2020-09-29 PROCEDURE — 84443 ASSAY THYROID STIM HORMONE: CPT

## 2020-09-29 PROCEDURE — 3079F DIAST BP 80-89 MM HG: CPT | Mod: CPTII,S$GLB,, | Performed by: FAMILY MEDICINE

## 2020-09-29 PROCEDURE — 80061 LIPID PANEL: CPT

## 2020-09-29 PROCEDURE — 85025 COMPLETE CBC W/AUTO DIFF WBC: CPT

## 2020-09-29 PROCEDURE — 99999 PR PBB SHADOW E&M-EST. PATIENT-LVL III: ICD-10-PCS | Mod: PBBFAC,,, | Performed by: FAMILY MEDICINE

## 2020-09-29 PROCEDURE — 3079F PR MOST RECENT DIASTOLIC BLOOD PRESSURE 80-89 MM HG: ICD-10-PCS | Mod: CPTII,S$GLB,, | Performed by: FAMILY MEDICINE

## 2020-09-29 PROCEDURE — 99396 PR PREVENTIVE VISIT,EST,40-64: ICD-10-PCS | Mod: S$GLB,,, | Performed by: FAMILY MEDICINE

## 2020-09-29 PROCEDURE — 3074F PR MOST RECENT SYSTOLIC BLOOD PRESSURE < 130 MM HG: ICD-10-PCS | Mod: CPTII,S$GLB,, | Performed by: FAMILY MEDICINE

## 2020-09-29 PROCEDURE — 3008F PR BODY MASS INDEX (BMI) DOCUMENTED: ICD-10-PCS | Mod: CPTII,S$GLB,, | Performed by: FAMILY MEDICINE

## 2020-09-30 LAB
ALBUMIN SERPL BCP-MCNC: 4.7 G/DL (ref 3.5–5.2)
ALP SERPL-CCNC: 85 U/L (ref 55–135)
ALT SERPL W/O P-5'-P-CCNC: 43 U/L (ref 10–44)
ANION GAP SERPL CALC-SCNC: 18 MMOL/L (ref 8–16)
AST SERPL-CCNC: 26 U/L (ref 10–40)
BILIRUB SERPL-MCNC: 0.9 MG/DL (ref 0.1–1)
BUN SERPL-MCNC: 44 MG/DL (ref 6–20)
CALCIUM SERPL-MCNC: 9.9 MG/DL (ref 8.7–10.5)
CHLORIDE SERPL-SCNC: 101 MMOL/L (ref 95–110)
CHOLEST SERPL-MCNC: 262 MG/DL (ref 120–199)
CHOLEST/HDLC SERPL: 5.1 {RATIO} (ref 2–5)
CO2 SERPL-SCNC: 19 MMOL/L (ref 23–29)
COMPLEXED PSA SERPL-MCNC: 1 NG/ML (ref 0–4)
CREAT SERPL-MCNC: 1.3 MG/DL (ref 0.5–1.4)
EST. GFR  (AFRICAN AMERICAN): >60 ML/MIN/1.73 M^2
EST. GFR  (NON AFRICAN AMERICAN): >60 ML/MIN/1.73 M^2
ESTIMATED AVG GLUCOSE: 97 MG/DL (ref 68–131)
GLUCOSE SERPL-MCNC: 83 MG/DL (ref 70–110)
HBA1C MFR BLD HPLC: 5 % (ref 4–5.6)
HCV AB SERPL QL IA: NEGATIVE
HDLC SERPL-MCNC: 51 MG/DL (ref 40–75)
HDLC SERPL: 19.5 % (ref 20–50)
HIV 1+2 AB+HIV1 P24 AG SERPL QL IA: NEGATIVE
LDLC SERPL CALC-MCNC: 161 MG/DL (ref 63–159)
NONHDLC SERPL-MCNC: 211 MG/DL
POTASSIUM SERPL-SCNC: 4.2 MMOL/L (ref 3.5–5.1)
PROT SERPL-MCNC: 8.1 G/DL (ref 6–8.4)
SODIUM SERPL-SCNC: 138 MMOL/L (ref 136–145)
TRIGL SERPL-MCNC: 250 MG/DL (ref 30–150)
TSH SERPL DL<=0.005 MIU/L-ACNC: 1.33 UIU/ML (ref 0.4–4)

## 2020-11-02 ENCOUNTER — OFFICE VISIT (OUTPATIENT)
Dept: URGENT CARE | Facility: CLINIC | Age: 40
End: 2020-11-02
Payer: COMMERCIAL

## 2020-11-02 VITALS
HEIGHT: 67 IN | TEMPERATURE: 99 F | RESPIRATION RATE: 18 BRPM | DIASTOLIC BLOOD PRESSURE: 87 MMHG | HEART RATE: 87 BPM | OXYGEN SATURATION: 97 % | WEIGHT: 181 LBS | BODY MASS INDEX: 28.41 KG/M2 | SYSTOLIC BLOOD PRESSURE: 144 MMHG

## 2020-11-02 DIAGNOSIS — R07.9 CHEST PAIN, UNSPECIFIED TYPE: ICD-10-CM

## 2020-11-02 DIAGNOSIS — Z20.822 EXPOSURE TO COVID-19 VIRUS: Primary | ICD-10-CM

## 2020-11-02 LAB
CTP QC/QA: YES
SARS-COV-2 RDRP RESP QL NAA+PROBE: NEGATIVE

## 2020-11-02 PROCEDURE — 99213 PR OFFICE/OUTPT VISIT, EST, LEVL III, 20-29 MIN: ICD-10-PCS | Mod: S$GLB,CS,, | Performed by: PHYSICIAN ASSISTANT

## 2020-11-02 PROCEDURE — U0002 COVID-19 LAB TEST NON-CDC: HCPCS | Mod: QW,S$GLB,, | Performed by: PHYSICIAN ASSISTANT

## 2020-11-02 PROCEDURE — U0002: ICD-10-PCS | Mod: QW,S$GLB,, | Performed by: PHYSICIAN ASSISTANT

## 2020-11-02 PROCEDURE — 99213 OFFICE O/P EST LOW 20 MIN: CPT | Mod: S$GLB,CS,, | Performed by: PHYSICIAN ASSISTANT

## 2020-11-02 NOTE — PATIENT INSTRUCTIONS
GO STRAIGHT TO ER IF WORSENING CHEST PAIN, SHORTNESS OF BREATH OR PASSING OUT.    CDC Testing and Quarantine Guidelines for Exposure:  A close exposure is defined as anyone who had a masked or an unmasked exposure to a known COVID -19 positive person, at less than 6 ft for more than 15 minutes. If your exposure meets this definition, then you are required to quarantine for 14 days per the CDC. They now recommend that a test can be performed if you are asymptomatic (someone who does not have any symptoms), and a test should be done if you develop symptoms after an exposure as described above.    If you meet the definition of a close exposure, it does not matter whether or not you are asymptomatic or symptomatic - A NEGATIVE TEST DOES NOT GET YOU OUT OF 14 DAYS OF QUARANTINE!    Please note that if you are asymptomatic and wait more than 4 days to test after an exposure, you risk lengthening your quarantine. This is because if you test positive as an asymptomatic, your isolation is 10 days from the date of the positive test, not the date of exposure. So for example, if you test positive as an asymptomatic on day 7 from exposure, you have now extended your 14 day quarantine to a 17 day isolation.    If your exposure does not meet the above definition, you may return to your normal activities including social distancing, wearing masks, and frequent handwashing.

## 2020-11-02 NOTE — PROGRESS NOTES
"Subjective:       Patient ID: Iban Amos is a 40 y.o. male.    Vitals:  height is 5' 7" (1.702 m) and weight is 82.1 kg (181 lb). His temperature is 98.6 °F (37 °C). His blood pressure is 144/87 (abnormal) and his pulse is 87. His respiration is 18 and oxygen saturation is 97%.     Chief Complaint: COVID-19 Concerns    Pt is a 40 y.o. with hx of HTN who presents to clinic with COVID concerns that started yesterday. Mild congestion (states at baseline due to his allergies), but constant mild central chest heaviness. No fever, cough, SOB, NVD or loss of taste or smell. Known covid contact 3 days ago. Has no cardiac history, leg pain/swelling, recent travel or recent surgeries     Other  This is a new problem. The current episode started yesterday. The problem occurs constantly. The problem has been unchanged. Pertinent negatives include no abdominal pain, arthralgias, chest pain, chills, congestion, coughing, fatigue, fever, headaches, joint swelling, myalgias, nausea, neck pain, rash, sore throat, vertigo or vomiting. Nothing aggravates the symptoms. He has tried nothing for the symptoms.       Constitution: Negative for chills, fatigue and fever.   HENT: Negative for congestion and sore throat.    Neck: Negative for neck pain and painful lymph nodes.   Cardiovascular: Negative for chest trauma, chest pain, leg swelling, palpitations, sob on exertion and passing out.   Eyes: Negative for double vision and blurred vision.   Respiratory: Positive for chest tightness. Negative for cough, sputum production, bloody sputum, shortness of breath, stridor and wheezing.    Gastrointestinal: Negative for abdominal pain, nausea, vomiting and diarrhea.   Genitourinary: Negative for dysuria, frequency and urgency.   Musculoskeletal: Negative for joint pain, joint swelling, muscle cramps and muscle ache.   Skin: Negative for color change, pale and rash.   Allergic/Immunologic: Negative for seasonal allergies. "   Neurological: Negative for dizziness, history of vertigo, light-headedness, passing out and headaches.   Hematologic/Lymphatic: Negative for swollen lymph nodes, easy bruising/bleeding and history of blood clots. Does not bruise/bleed easily.   Psychiatric/Behavioral: Negative for nervous/anxious, sleep disturbance and depression. The patient is not nervous/anxious.        Objective:      Physical Exam   Constitutional: He is oriented to person, place, and time. He appears well-developed. He is cooperative.  Non-toxic appearance. He does not appear ill. No distress.   HENT:   Head: Normocephalic and atraumatic.   Ears:   Right Ear: Hearing, tympanic membrane, external ear and ear canal normal.   Left Ear: Hearing, tympanic membrane, external ear and ear canal normal.   Nose: Congestion present. No mucosal edema, rhinorrhea or nasal deformity. No epistaxis. Right sinus exhibits no maxillary sinus tenderness and no frontal sinus tenderness. Left sinus exhibits no maxillary sinus tenderness and no frontal sinus tenderness.   Mouth/Throat: Uvula is midline, oropharynx is clear and moist and mucous membranes are normal. No trismus in the jaw. Normal dentition. No uvula swelling. No oropharyngeal exudate, posterior oropharyngeal edema or posterior oropharyngeal erythema. Oropharynx is clear.   Eyes: Conjunctivae and lids are normal. No scleral icterus.   Neck: Trachea normal, full passive range of motion without pain and phonation normal. Neck supple. No neck rigidity. No edema and no erythema present.   Cardiovascular: Normal rate, regular rhythm, normal heart sounds and normal pulses.   Pulses:       Radial pulses are 2+ on the right side and 2+ on the left side.   Pulmonary/Chest: Effort normal and breath sounds normal. No respiratory distress. He has no decreased breath sounds. He has no rhonchi.   Abdominal: Normal appearance.   Musculoskeletal: Normal range of motion.         General: No deformity.   Neurological:  He is alert and oriented to person, place, and time. He exhibits normal muscle tone. Coordination normal.   Skin: Skin is warm, dry, intact, not diaphoretic and not pale. Psychiatric: His speech is normal and behavior is normal. Judgment and thought content normal.   Nursing note and vitals reviewed.        Results for orders placed or performed in visit on 11/02/20   POCT COVID-19 Rapid Screening   Result Value Ref Range    POC Rapid COVID Negative Negative     Acceptable Yes        Assessment:       1. Exposure to COVID-19 virus    2. Chest pain, unspecified type        Plan:       Plan to do ECG and CXR, but patient declined. Symptoms for cardiac etiology are very atypical but does have HTN hx. Given strong ED precautions for any worsening CP, SOB, palpitations or syncope. Neg covid test. Instructed to self-isolate per CDC guidelines in case of false negative result. Given return precautions    Exposure to COVID-19 virus  -     POCT COVID-19 Rapid Screening    Chest pain, unspecified type    Caroline Fusilier PA-C [facility]          Patient Instructions   GO STRAIGHT TO ER IF WORSENING CHEST PAIN, SHORTNESS OF BREATH OR PASSING OUT.    CDC Testing and Quarantine Guidelines for Exposure:  A close exposure is defined as anyone who had a masked or an unmasked exposure to a known COVID -19 positive person, at less than 6 ft for more than 15 minutes. If your exposure meets this definition, then you are required to quarantine for 14 days per the CDC. They now recommend that a test can be performed if you are asymptomatic (someone who does not have any symptoms), and a test should be done if you develop symptoms after an exposure as described above.    If you meet the definition of a close exposure, it does not matter whether or not you are asymptomatic or symptomatic - A NEGATIVE TEST DOES NOT GET YOU OUT OF 14 DAYS OF QUARANTINE!    Please note that if you are asymptomatic and wait  more than 4 days to test after an exposure, you risk lengthening your quarantine. This is because if you test positive as an asymptomatic, your isolation is 10 days from the date of the positive test, not the date of exposure. So for example, if you test positive as an asymptomatic on day 7 from exposure, you have now extended your 14 day quarantine to a 17 day isolation.    If your exposure does not meet the above definition, you may return to your normal activities including social distancing, wearing masks, and frequent handwashing.

## 2020-11-05 ENCOUNTER — TELEPHONE (OUTPATIENT)
Dept: URGENT CARE | Facility: CLINIC | Age: 40
End: 2020-11-05

## 2021-01-21 ENCOUNTER — CLINICAL SUPPORT (OUTPATIENT)
Dept: URGENT CARE | Facility: CLINIC | Age: 41
End: 2021-01-21
Payer: COMMERCIAL

## 2021-01-21 VITALS — OXYGEN SATURATION: 96 % | TEMPERATURE: 98 F | HEART RATE: 71 BPM

## 2021-01-21 DIAGNOSIS — J02.9 SORE THROAT: Primary | ICD-10-CM

## 2021-01-21 LAB
CTP QC/QA: YES
SARS-COV-2 RDRP RESP QL NAA+PROBE: NEGATIVE

## 2021-01-21 PROCEDURE — U0002 COVID-19 LAB TEST NON-CDC: HCPCS | Mod: QW,S$GLB,, | Performed by: PHYSICIAN ASSISTANT

## 2021-01-21 PROCEDURE — 99211 PR OFFICE/OUTPT VISIT, EST, LEVL I: ICD-10-PCS | Mod: S$GLB,,, | Performed by: PHYSICIAN ASSISTANT

## 2021-01-21 PROCEDURE — U0002: ICD-10-PCS | Mod: QW,S$GLB,, | Performed by: PHYSICIAN ASSISTANT

## 2021-01-21 PROCEDURE — 99211 OFF/OP EST MAY X REQ PHY/QHP: CPT | Mod: S$GLB,,, | Performed by: PHYSICIAN ASSISTANT

## 2021-04-28 ENCOUNTER — PATIENT MESSAGE (OUTPATIENT)
Dept: RESEARCH | Facility: HOSPITAL | Age: 41
End: 2021-04-28

## 2021-06-14 ENCOUNTER — PATIENT MESSAGE (OUTPATIENT)
Dept: PRIMARY CARE CLINIC | Facility: CLINIC | Age: 41
End: 2021-06-14

## 2021-06-14 DIAGNOSIS — G47.33 OSA ON CPAP: Primary | ICD-10-CM

## 2021-06-17 ENCOUNTER — PATIENT MESSAGE (OUTPATIENT)
Dept: PRIMARY CARE CLINIC | Facility: CLINIC | Age: 41
End: 2021-06-17

## 2021-10-03 ENCOUNTER — OFFICE VISIT (OUTPATIENT)
Dept: URGENT CARE | Facility: CLINIC | Age: 41
End: 2021-10-03
Payer: COMMERCIAL

## 2021-10-03 VITALS
OXYGEN SATURATION: 96 % | BODY MASS INDEX: 27.47 KG/M2 | RESPIRATION RATE: 18 BRPM | WEIGHT: 175 LBS | DIASTOLIC BLOOD PRESSURE: 74 MMHG | TEMPERATURE: 98 F | HEIGHT: 67 IN | HEART RATE: 68 BPM | SYSTOLIC BLOOD PRESSURE: 122 MMHG

## 2021-10-03 DIAGNOSIS — R51.9 ACUTE NONINTRACTABLE HEADACHE, UNSPECIFIED HEADACHE TYPE: ICD-10-CM

## 2021-10-03 DIAGNOSIS — Z11.52 ENCOUNTER FOR SCREENING FOR COVID-19: Primary | ICD-10-CM

## 2021-10-03 DIAGNOSIS — R09.81 NASAL CONGESTION: ICD-10-CM

## 2021-10-03 LAB
CTP QC/QA: YES
SARS-COV-2 RDRP RESP QL NAA+PROBE: NEGATIVE

## 2021-10-03 PROCEDURE — 3078F PR MOST RECENT DIASTOLIC BLOOD PRESSURE < 80 MM HG: ICD-10-PCS | Mod: CPTII,S$GLB,, | Performed by: PHYSICIAN ASSISTANT

## 2021-10-03 PROCEDURE — 3074F SYST BP LT 130 MM HG: CPT | Mod: CPTII,S$GLB,, | Performed by: PHYSICIAN ASSISTANT

## 2021-10-03 PROCEDURE — 4010F PR ACE/ARB THEARPY RXD/TAKEN: ICD-10-PCS | Mod: CPTII,S$GLB,, | Performed by: PHYSICIAN ASSISTANT

## 2021-10-03 PROCEDURE — U0002 COVID-19 LAB TEST NON-CDC: HCPCS | Mod: QW,S$GLB,, | Performed by: PHYSICIAN ASSISTANT

## 2021-10-03 PROCEDURE — 3008F PR BODY MASS INDEX (BMI) DOCUMENTED: ICD-10-PCS | Mod: CPTII,S$GLB,, | Performed by: PHYSICIAN ASSISTANT

## 2021-10-03 PROCEDURE — 3074F PR MOST RECENT SYSTOLIC BLOOD PRESSURE < 130 MM HG: ICD-10-PCS | Mod: CPTII,S$GLB,, | Performed by: PHYSICIAN ASSISTANT

## 2021-10-03 PROCEDURE — 1159F MED LIST DOCD IN RCRD: CPT | Mod: CPTII,S$GLB,, | Performed by: PHYSICIAN ASSISTANT

## 2021-10-03 PROCEDURE — 99213 PR OFFICE/OUTPT VISIT, EST, LEVL III, 20-29 MIN: ICD-10-PCS | Mod: S$GLB,CS,, | Performed by: PHYSICIAN ASSISTANT

## 2021-10-03 PROCEDURE — U0002: ICD-10-PCS | Mod: QW,S$GLB,, | Performed by: PHYSICIAN ASSISTANT

## 2021-10-03 PROCEDURE — 1159F PR MEDICATION LIST DOCUMENTED IN MEDICAL RECORD: ICD-10-PCS | Mod: CPTII,S$GLB,, | Performed by: PHYSICIAN ASSISTANT

## 2021-10-03 PROCEDURE — 4010F ACE/ARB THERAPY RXD/TAKEN: CPT | Mod: CPTII,S$GLB,, | Performed by: PHYSICIAN ASSISTANT

## 2021-10-03 PROCEDURE — 3078F DIAST BP <80 MM HG: CPT | Mod: CPTII,S$GLB,, | Performed by: PHYSICIAN ASSISTANT

## 2021-10-03 PROCEDURE — 1160F PR REVIEW ALL MEDS BY PRESCRIBER/CLIN PHARMACIST DOCUMENTED: ICD-10-PCS | Mod: CPTII,S$GLB,, | Performed by: PHYSICIAN ASSISTANT

## 2021-10-03 PROCEDURE — 99213 OFFICE O/P EST LOW 20 MIN: CPT | Mod: S$GLB,CS,, | Performed by: PHYSICIAN ASSISTANT

## 2021-10-03 PROCEDURE — 3008F BODY MASS INDEX DOCD: CPT | Mod: CPTII,S$GLB,, | Performed by: PHYSICIAN ASSISTANT

## 2021-10-03 PROCEDURE — 1160F RVW MEDS BY RX/DR IN RCRD: CPT | Mod: CPTII,S$GLB,, | Performed by: PHYSICIAN ASSISTANT

## 2021-10-06 ENCOUNTER — TELEPHONE (OUTPATIENT)
Dept: URGENT CARE | Facility: CLINIC | Age: 41
End: 2021-10-06

## 2021-10-25 ENCOUNTER — CLINICAL SUPPORT (OUTPATIENT)
Dept: URGENT CARE | Facility: CLINIC | Age: 41
End: 2021-10-25
Payer: COMMERCIAL

## 2021-10-25 DIAGNOSIS — Z11.52 ENCOUNTER FOR SCREENING FOR COVID-19: Primary | ICD-10-CM

## 2021-10-25 LAB
CTP QC/QA: YES
SARS-COV-2 RDRP RESP QL NAA+PROBE: NEGATIVE

## 2021-10-25 PROCEDURE — 99211 OFF/OP EST MAY X REQ PHY/QHP: CPT | Mod: S$GLB,CS,, | Performed by: PHYSICIAN ASSISTANT

## 2021-10-25 PROCEDURE — 99211 PR OFFICE/OUTPT VISIT, EST, LEVL I: ICD-10-PCS | Mod: S$GLB,CS,, | Performed by: PHYSICIAN ASSISTANT

## 2021-10-25 PROCEDURE — U0002: ICD-10-PCS | Mod: QW,S$GLB,, | Performed by: PHYSICIAN ASSISTANT

## 2021-10-25 PROCEDURE — U0002 COVID-19 LAB TEST NON-CDC: HCPCS | Mod: QW,S$GLB,, | Performed by: PHYSICIAN ASSISTANT

## 2021-10-28 ENCOUNTER — TELEPHONE (OUTPATIENT)
Dept: URGENT CARE | Facility: CLINIC | Age: 41
End: 2021-10-28
Payer: COMMERCIAL

## 2021-12-01 ENCOUNTER — OFFICE VISIT (OUTPATIENT)
Dept: PRIMARY CARE CLINIC | Facility: CLINIC | Age: 41
End: 2021-12-01
Payer: COMMERCIAL

## 2021-12-01 ENCOUNTER — LAB VISIT (OUTPATIENT)
Dept: LAB | Facility: HOSPITAL | Age: 41
End: 2021-12-01
Attending: ANESTHESIOLOGY
Payer: COMMERCIAL

## 2021-12-01 VITALS
WEIGHT: 189.81 LBS | HEART RATE: 70 BPM | TEMPERATURE: 98 F | DIASTOLIC BLOOD PRESSURE: 78 MMHG | BODY MASS INDEX: 29.73 KG/M2 | SYSTOLIC BLOOD PRESSURE: 124 MMHG | OXYGEN SATURATION: 97 %

## 2021-12-01 DIAGNOSIS — G47.33 OSA ON CPAP: ICD-10-CM

## 2021-12-01 DIAGNOSIS — Z12.5 PROSTATE CANCER SCREENING: ICD-10-CM

## 2021-12-01 DIAGNOSIS — I10 ESSENTIAL HYPERTENSION: ICD-10-CM

## 2021-12-01 DIAGNOSIS — E78.5 HYPERLIPIDEMIA, UNSPECIFIED HYPERLIPIDEMIA TYPE: ICD-10-CM

## 2021-12-01 DIAGNOSIS — Z00.00 ROUTINE GENERAL MEDICAL EXAMINATION AT A HEALTH CARE FACILITY: ICD-10-CM

## 2021-12-01 DIAGNOSIS — Z00.00 ROUTINE GENERAL MEDICAL EXAMINATION AT A HEALTH CARE FACILITY: Primary | ICD-10-CM

## 2021-12-01 LAB
ALBUMIN SERPL BCP-MCNC: 4.3 G/DL (ref 3.5–5.2)
ALP SERPL-CCNC: 86 U/L (ref 55–135)
ALT SERPL W/O P-5'-P-CCNC: 55 U/L (ref 10–44)
ANION GAP SERPL CALC-SCNC: 11 MMOL/L (ref 8–16)
AST SERPL-CCNC: 27 U/L (ref 10–40)
BASOPHILS # BLD AUTO: 0.06 K/UL (ref 0–0.2)
BASOPHILS NFR BLD: 0.8 % (ref 0–1.9)
BILIRUB SERPL-MCNC: 0.7 MG/DL (ref 0.1–1)
BUN SERPL-MCNC: 17 MG/DL (ref 6–20)
CALCIUM SERPL-MCNC: 9.2 MG/DL (ref 8.7–10.5)
CHLORIDE SERPL-SCNC: 103 MMOL/L (ref 95–110)
CHOLEST SERPL-MCNC: 224 MG/DL (ref 120–199)
CHOLEST/HDLC SERPL: 5.2 {RATIO} (ref 2–5)
CO2 SERPL-SCNC: 25 MMOL/L (ref 23–29)
COMPLEXED PSA SERPL-MCNC: 0.94 NG/ML (ref 0–4)
CREAT SERPL-MCNC: 0.8 MG/DL (ref 0.5–1.4)
DIFFERENTIAL METHOD: ABNORMAL
EOSINOPHIL # BLD AUTO: 0.4 K/UL (ref 0–0.5)
EOSINOPHIL NFR BLD: 4.9 % (ref 0–8)
ERYTHROCYTE [DISTWIDTH] IN BLOOD BY AUTOMATED COUNT: 12.4 % (ref 11.5–14.5)
EST. GFR  (AFRICAN AMERICAN): >60 ML/MIN/1.73 M^2
EST. GFR  (NON AFRICAN AMERICAN): >60 ML/MIN/1.73 M^2
ESTIMATED AVG GLUCOSE: 97 MG/DL (ref 68–131)
GLUCOSE SERPL-MCNC: 79 MG/DL (ref 70–110)
HBA1C MFR BLD: 5 % (ref 4–5.6)
HCT VFR BLD AUTO: 47.4 % (ref 40–54)
HDLC SERPL-MCNC: 43 MG/DL (ref 40–75)
HDLC SERPL: 19.2 % (ref 20–50)
HGB BLD-MCNC: 16.1 G/DL (ref 14–18)
IMM GRANULOCYTES # BLD AUTO: 0.03 K/UL (ref 0–0.04)
IMM GRANULOCYTES NFR BLD AUTO: 0.4 % (ref 0–0.5)
LDLC SERPL CALC-MCNC: 124.6 MG/DL (ref 63–159)
LYMPHOCYTES # BLD AUTO: 2.2 K/UL (ref 1–4.8)
LYMPHOCYTES NFR BLD: 30.9 % (ref 18–48)
MCH RBC QN AUTO: 32.4 PG (ref 27–31)
MCHC RBC AUTO-ENTMCNC: 34 G/DL (ref 32–36)
MCV RBC AUTO: 95 FL (ref 82–98)
MONOCYTES # BLD AUTO: 0.7 K/UL (ref 0.3–1)
MONOCYTES NFR BLD: 9.7 % (ref 4–15)
NEUTROPHILS # BLD AUTO: 3.8 K/UL (ref 1.8–7.7)
NEUTROPHILS NFR BLD: 53.3 % (ref 38–73)
NONHDLC SERPL-MCNC: 181 MG/DL
NRBC BLD-RTO: 0 /100 WBC
PLATELET # BLD AUTO: 174 K/UL (ref 150–450)
PMV BLD AUTO: 11.3 FL (ref 9.2–12.9)
POTASSIUM SERPL-SCNC: 4 MMOL/L (ref 3.5–5.1)
PROT SERPL-MCNC: 7 G/DL (ref 6–8.4)
RBC # BLD AUTO: 4.97 M/UL (ref 4.6–6.2)
SODIUM SERPL-SCNC: 139 MMOL/L (ref 136–145)
TRIGL SERPL-MCNC: 282 MG/DL (ref 30–150)
TSH SERPL DL<=0.005 MIU/L-ACNC: 1.09 UIU/ML (ref 0.4–4)
WBC # BLD AUTO: 7.18 K/UL (ref 3.9–12.7)

## 2021-12-01 PROCEDURE — 4010F PR ACE/ARB THEARPY RXD/TAKEN: ICD-10-PCS | Mod: CPTII,S$GLB,, | Performed by: FAMILY MEDICINE

## 2021-12-01 PROCEDURE — 84443 ASSAY THYROID STIM HORMONE: CPT | Performed by: FAMILY MEDICINE

## 2021-12-01 PROCEDURE — 99999 PR PBB SHADOW E&M-EST. PATIENT-LVL III: CPT | Mod: PBBFAC,,, | Performed by: FAMILY MEDICINE

## 2021-12-01 PROCEDURE — 80061 LIPID PANEL: CPT | Performed by: FAMILY MEDICINE

## 2021-12-01 PROCEDURE — 80053 COMPREHEN METABOLIC PANEL: CPT | Performed by: FAMILY MEDICINE

## 2021-12-01 PROCEDURE — 4010F ACE/ARB THERAPY RXD/TAKEN: CPT | Mod: CPTII,S$GLB,, | Performed by: FAMILY MEDICINE

## 2021-12-01 PROCEDURE — 84153 ASSAY OF PSA TOTAL: CPT | Performed by: FAMILY MEDICINE

## 2021-12-01 PROCEDURE — 99999 PR PBB SHADOW E&M-EST. PATIENT-LVL III: ICD-10-PCS | Mod: PBBFAC,,, | Performed by: FAMILY MEDICINE

## 2021-12-01 PROCEDURE — 85025 COMPLETE CBC W/AUTO DIFF WBC: CPT | Performed by: FAMILY MEDICINE

## 2021-12-01 PROCEDURE — 99396 PREV VISIT EST AGE 40-64: CPT | Mod: S$GLB,,, | Performed by: FAMILY MEDICINE

## 2021-12-01 PROCEDURE — 83036 HEMOGLOBIN GLYCOSYLATED A1C: CPT | Performed by: FAMILY MEDICINE

## 2021-12-01 PROCEDURE — 99396 PR PREVENTIVE VISIT,EST,40-64: ICD-10-PCS | Mod: S$GLB,,, | Performed by: FAMILY MEDICINE

## 2021-12-10 ENCOUNTER — PATIENT OUTREACH (OUTPATIENT)
Dept: ADMINISTRATIVE | Facility: HOSPITAL | Age: 41
End: 2021-12-10
Payer: COMMERCIAL

## 2022-10-20 ENCOUNTER — PATIENT MESSAGE (OUTPATIENT)
Dept: ADMINISTRATIVE | Facility: HOSPITAL | Age: 42
End: 2022-10-20
Payer: COMMERCIAL

## 2023-03-01 ENCOUNTER — OFFICE VISIT (OUTPATIENT)
Dept: PRIMARY CARE CLINIC | Facility: CLINIC | Age: 43
End: 2023-03-01
Payer: COMMERCIAL

## 2023-03-01 ENCOUNTER — LAB VISIT (OUTPATIENT)
Dept: LAB | Facility: HOSPITAL | Age: 43
End: 2023-03-01
Attending: PHYSICIAN ASSISTANT
Payer: COMMERCIAL

## 2023-03-01 VITALS
WEIGHT: 194 LBS | OXYGEN SATURATION: 96 % | HEIGHT: 67 IN | BODY MASS INDEX: 30.45 KG/M2 | DIASTOLIC BLOOD PRESSURE: 82 MMHG | SYSTOLIC BLOOD PRESSURE: 120 MMHG | TEMPERATURE: 98 F | HEART RATE: 96 BPM

## 2023-03-01 DIAGNOSIS — G47.33 OSA ON CPAP: ICD-10-CM

## 2023-03-01 DIAGNOSIS — E78.5 HYPERLIPIDEMIA, UNSPECIFIED HYPERLIPIDEMIA TYPE: ICD-10-CM

## 2023-03-01 DIAGNOSIS — G89.29 CHRONIC PAIN OF LEFT KNEE: ICD-10-CM

## 2023-03-01 DIAGNOSIS — I10 ESSENTIAL HYPERTENSION: ICD-10-CM

## 2023-03-01 DIAGNOSIS — M79.671 RIGHT FOOT PAIN: ICD-10-CM

## 2023-03-01 DIAGNOSIS — M54.31 CHRONIC SCIATICA, RIGHT: ICD-10-CM

## 2023-03-01 DIAGNOSIS — Z00.00 ROUTINE GENERAL MEDICAL EXAMINATION AT A HEALTH CARE FACILITY: ICD-10-CM

## 2023-03-01 DIAGNOSIS — Z12.5 PROSTATE CANCER SCREENING: ICD-10-CM

## 2023-03-01 DIAGNOSIS — Z00.00 ROUTINE GENERAL MEDICAL EXAMINATION AT A HEALTH CARE FACILITY: Primary | ICD-10-CM

## 2023-03-01 DIAGNOSIS — M25.562 CHRONIC PAIN OF LEFT KNEE: ICD-10-CM

## 2023-03-01 LAB
ALBUMIN SERPL BCP-MCNC: 4.5 G/DL (ref 3.5–5.2)
ALBUMIN/CREAT UR: NORMAL UG/MG (ref 0–30)
ALP SERPL-CCNC: 80 U/L (ref 55–135)
ALT SERPL W/O P-5'-P-CCNC: 52 U/L (ref 10–44)
ANION GAP SERPL CALC-SCNC: 9 MMOL/L (ref 8–16)
AST SERPL-CCNC: 29 U/L (ref 10–40)
BILIRUB SERPL-MCNC: 0.5 MG/DL (ref 0.1–1)
BUN SERPL-MCNC: 15 MG/DL (ref 6–20)
CALCIUM SERPL-MCNC: 9.5 MG/DL (ref 8.7–10.5)
CHLORIDE SERPL-SCNC: 103 MMOL/L (ref 95–110)
CHOLEST SERPL-MCNC: 233 MG/DL (ref 120–199)
CHOLEST/HDLC SERPL: 4.9 {RATIO} (ref 2–5)
CO2 SERPL-SCNC: 26 MMOL/L (ref 23–29)
CREAT SERPL-MCNC: 0.9 MG/DL (ref 0.5–1.4)
CREAT UR-MCNC: 69 MG/DL (ref 23–375)
EST. GFR  (NO RACE VARIABLE): >60 ML/MIN/1.73 M^2
GLUCOSE SERPL-MCNC: 90 MG/DL (ref 70–110)
HDLC SERPL-MCNC: 48 MG/DL (ref 40–75)
HDLC SERPL: 20.6 % (ref 20–50)
LDLC SERPL CALC-MCNC: 132.2 MG/DL (ref 63–159)
MICROALBUMIN UR DL<=1MG/L-MCNC: <5 UG/ML
NONHDLC SERPL-MCNC: 185 MG/DL
POTASSIUM SERPL-SCNC: 4 MMOL/L (ref 3.5–5.1)
PROT SERPL-MCNC: 7.7 G/DL (ref 6–8.4)
SODIUM SERPL-SCNC: 138 MMOL/L (ref 136–145)
TRIGL SERPL-MCNC: 264 MG/DL (ref 30–150)

## 2023-03-01 PROCEDURE — 84443 ASSAY THYROID STIM HORMONE: CPT | Performed by: FAMILY MEDICINE

## 2023-03-01 PROCEDURE — 99999 PR PBB SHADOW E&M-EST. PATIENT-LVL IV: ICD-10-PCS | Mod: PBBFAC,,, | Performed by: FAMILY MEDICINE

## 2023-03-01 PROCEDURE — 99396 PREV VISIT EST AGE 40-64: CPT | Mod: S$GLB,,, | Performed by: FAMILY MEDICINE

## 2023-03-01 PROCEDURE — 36415 COLL VENOUS BLD VENIPUNCTURE: CPT | Mod: PN | Performed by: FAMILY MEDICINE

## 2023-03-01 PROCEDURE — 99214 PR OFFICE/OUTPT VISIT, EST, LEVL IV, 30-39 MIN: ICD-10-PCS | Mod: 25,S$GLB,, | Performed by: FAMILY MEDICINE

## 2023-03-01 PROCEDURE — 85025 COMPLETE CBC W/AUTO DIFF WBC: CPT | Performed by: FAMILY MEDICINE

## 2023-03-01 PROCEDURE — 82570 ASSAY OF URINE CREATININE: CPT | Performed by: FAMILY MEDICINE

## 2023-03-01 PROCEDURE — 99999 PR PBB SHADOW E&M-EST. PATIENT-LVL IV: CPT | Mod: PBBFAC,,, | Performed by: FAMILY MEDICINE

## 2023-03-01 PROCEDURE — 80061 LIPID PANEL: CPT | Performed by: FAMILY MEDICINE

## 2023-03-01 PROCEDURE — 84153 ASSAY OF PSA TOTAL: CPT | Performed by: FAMILY MEDICINE

## 2023-03-01 PROCEDURE — 99214 OFFICE O/P EST MOD 30 MIN: CPT | Mod: 25,S$GLB,, | Performed by: FAMILY MEDICINE

## 2023-03-01 PROCEDURE — 84439 ASSAY OF FREE THYROXINE: CPT | Performed by: FAMILY MEDICINE

## 2023-03-01 PROCEDURE — 99396 PR PREVENTIVE VISIT,EST,40-64: ICD-10-PCS | Mod: S$GLB,,, | Performed by: FAMILY MEDICINE

## 2023-03-01 PROCEDURE — 80053 COMPREHEN METABOLIC PANEL: CPT | Performed by: FAMILY MEDICINE

## 2023-03-01 PROCEDURE — 83036 HEMOGLOBIN GLYCOSYLATED A1C: CPT | Performed by: FAMILY MEDICINE

## 2023-03-01 RX ORDER — OLMESARTAN MEDOXOMIL 20 MG/1
20 TABLET ORAL DAILY
Qty: 90 TABLET | Refills: 3 | Status: SHIPPED | OUTPATIENT
Start: 2023-03-01 | End: 2023-03-01

## 2023-03-01 RX ORDER — OLMESARTAN MEDOXOMIL 20 MG/1
20 TABLET ORAL DAILY
Qty: 30 TABLET | Refills: 13 | Status: SHIPPED | OUTPATIENT
Start: 2023-03-01 | End: 2024-03-04 | Stop reason: SDUPTHER

## 2023-03-01 NOTE — PROGRESS NOTES
CC: Pt presents to clinic c/o foot pain and left knee injury.   HPI:   Pt reports pain in right heel that extends to the toes on the plantar side of the foot. States pain started shortly after falling while skiing last month. Pt fell and started experiencing left knee pain. Reports pain has persisted since then. Foot movement is limited in movement. Has not had X-rays done yet.   ROS: knee pain, foot pain, back pain  PE:   Limited ROM of back, hamstrings, pain in right heel and mid foot with palpation, no edema, pain in left knee with medial torsion  A/P:  Right Foot Pain  Left knee pain Chronic but new in workup  Chronic sciatica    Diagnoses and all orders for this visit:  Chronic sciatica, right- worse Problem, discussed symptoms, work up, suspected diagnosis. Will place order for labs, and/or referral for treatment.   .  Once workup has been reviewed will make adjustments if needed.   -     X-Ray Lumbar Spine AP And Lateral; Future  -     X-Ray Foot Complete Right; Future  -     X-Ray Knee Complete 4 or More Views Left; Future    Chronic pain of left knee- New Problem, discussed symptoms, work up, suspected diagnosis. Will place order for labs, and/or referral for treatment.   .  Once workup has been reviewed will make adjustments if needed.   -     X-Ray Lumbar Spine AP And Lateral; Future  -     X-Ray Knee Complete 4 or More Views Left; Future    Right foot pain-- New Problem, discussed symptoms, work up, suspected diagnosis. Will place order for labs, and/or referral for treatment.   .  Once workup has been reviewed will make adjustments if needed.     -     X-Ray Lumbar Spine AP And Lateral; Future  -     X-Ray Foot Complete Right; Future

## 2023-03-01 NOTE — PROGRESS NOTES
Subjective:      Patient ID: Iban Amos is a 43 y.o. male.    Chief Complaint:annual exam and problems of  Leg Pain and Foot Injury      Patient is a 43 y.o. male coming in today  has a past medical history of Sleep apnea and Unspecified essential hypertension.    Here for annual exam. Doing well. Fatigued. Bp controlled. Using cpap at night.     [facility] Reason For Visit    2/27/2023  6:09 PM CST - Filed by Patient   What is your primary reason for visit? Other/Annual   Have you experienced any of the following:   Change in activity? No   Unexpected weight change? No   Neck pain? No   Hearing loss? Yes   Runny nose? No   Trouble swallowing? No   Eye discharge? No   Changes in vision? Yes   Chest tightness? No   Wheezing? No   Chest pain? No   Heart beating fast or racing? No   Blood in stool? No   Constipation? No   Vomiting? No   Diarrhea? No   Drinking much more than usual? No   Urinating much more than usual? No   Difficulty urinating? No   Have a sudden urge to urinate? No   Blood in the urine? No   Joint swelling? No   Joint pain? Yes   Headaches? No   Weakness? Yes   Confusion? No   Feeling depressed? No     [facility]eq Documents    2/27/2023  6:10 PM CST - Filed by Patient   Would you like a copy of [facility]'s Financial Assistance Policy Summary? No, I would not like a copy.   Is this visit work-related or due to a work-related accident/injury? No   Would you like to receive more information regarding your rights and protections under the [place] Billing act? No, I would not.     [facility]    2/27/2023  6:13 PM CST - Filed by Patient   On average, how many days per week do you engage in moderate to strenuous exercise (like a brisk walk)? 2 days   On average, how many minutes do you engage in exercise at this level? 30 min   Do you feel stress - tense, restless, nervous, or anxious, or unable to sleep at night because your mind is troubled all the time - these days? Not at all   Do you belong to  any clubs or organizations such as Denominational groups, unions, fraternal or athletic groups, or school groups? Patient refused   How often do you attend meetings of the clubs or organizations you belong to? Patient refused   In a typical week, how many times do you talk on the phone with family, friends, or neighbors? Patient refused   How often do you get together with friends or relatives? Patient refused   Are you , , , , never , or living with a partner?    How hard is it for you to pay for the very basics like food, housing, medical care, and heating? Not very hard   Within the past 12 months, you worried that your food would run out before you got the money to buy more. Never true   Within the past 12 months, the food you bought just didnt last and you didnt have money to get more. Never true   In the past 12 months, has lack of transportation kept you from medical appointments or from getting medications? No   In the past 12 months, has lack of transportation kept you from meetings, work, or from getting things needed for daily living? No   How often do you have a drink containing alcohol? Monthly or less   How many drinks containing alcohol do you have on a typical day when you are drinking? 1 or 2   How often do you have six or more drinks on one occasion? Never   In the last 12 months, was there a time when you were not able to pay the mortgage or rent on time? No   In the last 12 months, how many places have you lived? (range: at least 0) 1   In the last 12 months, was there a time when you did not have a steady place to sleep or slept in a shelter (including now)? No         Pmh, Psh, Family Hx, Social Hx, HM updated in Epic Tabs today.   Review of Systems   Constitutional:  Negative for activity change and unexpected weight change.   HENT:  Positive for hearing loss. Negative for rhinorrhea and trouble swallowing.    Eyes:  Positive for visual disturbance.  "Negative for discharge.   Respiratory:  Negative for chest tightness and wheezing.    Cardiovascular:  Negative for chest pain and palpitations.   Gastrointestinal:  Negative for blood in stool, constipation, diarrhea and vomiting.   Endocrine: Negative for polydipsia and polyuria.   Genitourinary:  Negative for difficulty urinating, hematuria and urgency.   Musculoskeletal:  Positive for arthralgias. Negative for joint swelling and neck pain.   Neurological:  Positive for weakness. Negative for headaches.   Psychiatric/Behavioral:  Negative for confusion and dysphoric mood.    Objective:     Vitals:    03/01/23 1359   BP: 120/82   Pulse: 96   Temp: 98 °F (36.7 °C)   SpO2: 96%   Weight: 88 kg (194 lb 0.1 oz)   Height: 5' 7" (1.702 m)     Wt Readings from Last 10 Encounters:   03/01/23 88 kg (194 lb 0.1 oz)   12/01/21 86.1 kg (189 lb 13.1 oz)   10/03/21 79.4 kg (175 lb)   11/02/20 82.1 kg (181 lb)   09/29/20 82.6 kg (181 lb 15.8 oz)   04/03/19 82.8 kg (182 lb 8.7 oz)   10/19/18 81.1 kg (178 lb 12.7 oz)   09/27/18 83.7 kg (184 lb 8.4 oz)   09/21/18 83.2 kg (183 lb 6.8 oz)   09/04/18 82.8 kg (182 lb 8.7 oz)     Physical Exam  Vitals reviewed.   Constitutional:       General: He is not in acute distress.     Appearance: Normal appearance. He is well-developed. He is obese.   HENT:      Head: Normocephalic and atraumatic.      Right Ear: Tympanic membrane and external ear normal.      Left Ear: Tympanic membrane and external ear normal.      Nose: Nose normal.      Mouth/Throat:      Mouth: Mucous membranes are moist.      Pharynx: Oropharynx is clear.   Eyes:      Conjunctiva/sclera: Conjunctivae normal.      Pupils: Pupils are equal, round, and reactive to light.   Neck:      Thyroid: No thyromegaly.   Cardiovascular:      Rate and Rhythm: Normal rate and regular rhythm.      Heart sounds: No murmur heard.    No friction rub. No gallop.   Pulmonary:      Effort: Pulmonary effort is normal. No respiratory distress.      " Breath sounds: Normal breath sounds.   Abdominal:      General: Bowel sounds are normal. There is no distension.      Palpations: Abdomen is soft.      Tenderness: There is no abdominal tenderness. There is no rebound.   Musculoskeletal:      Cervical back: Normal range of motion and neck supple.      Right foot: Decreased range of motion. Tenderness present.   Lymphadenopathy:      Cervical: No cervical adenopathy.   Skin:     General: Skin is warm and dry.   Neurological:      General: No focal deficit present.      Mental Status: He is alert and oriented to person, place, and time.      Coordination: Coordination normal.   Psychiatric:         Attention and Perception: Attention normal.         Mood and Affect: Mood and affect normal.         Speech: Speech normal.         Behavior: Behavior normal.         Thought Content: Thought content normal.         Cognition and Memory: Cognition normal.         Judgment: Judgment normal.     Assessment:     1. Routine general medical examination at a health care facility    2. Right foot pain    3. Chronic pain of left knee    4. Chronic sciatica, right    5. BHAVIK on CPAP    6. Essential hypertension    7. Hyperlipidemia, unspecified hyperlipidemia type    8. Prostate cancer screening        LABS:   Lab Results   Component Value Date    HGBA1C 5.0 12/01/2021    HGBA1C 5.0 09/29/2020    HGBA1C 4.9 09/26/2017      Lab Results   Component Value Date    CHOL 224 (H) 12/01/2021    CHOL 262 (H) 09/29/2020    CHOL 218 (H) 11/09/2018     Lab Results   Component Value Date    LDLCALC 124.6 12/01/2021    LDLCALC 161.0 (H) 09/29/2020    LDLCALC 135.2 11/09/2018     Lab Results   Component Value Date    WBC 7.18 12/01/2021    HGB 16.1 12/01/2021    HCT 47.4 12/01/2021     12/01/2021    CHOL 224 (H) 12/01/2021    TRIG 282 (H) 12/01/2021    HDL 43 12/01/2021    ALT 55 (H) 12/01/2021    AST 27 12/01/2021     12/01/2021    K 4.0 12/01/2021     12/01/2021    CREATININE  0.8 12/01/2021    BUN 17 12/01/2021    CO2 25 12/01/2021    TSH 1.088 12/01/2021    PSA 0.94 12/01/2021    HGBA1C 5.0 12/01/2021       The 10-year ASCVD risk score (Jc CARIAS, et al., 2019) is: 3.1%    Values used to calculate the score:      Age: 43 years      Sex: Male      Is Non- : No      Diabetic: No      Tobacco smoker: No      Systolic Blood Pressure: 120 mmHg      Is BP treated: Yes      HDL Cholesterol: 43 mg/dL      Total Cholesterol: 224 mg/dL    X-Ray Lumbar Spine AP And Lateral  Narrative: EXAMINATION:  XR LUMBAR SPINE AP AND LATERAL    CLINICAL HISTORY:  Low back pain, >6wks conservative tx, persistent-progressive sx, surgical candidate;Low back pain    TECHNIQUE:  AP, lateral and spot images were performed of the lumbar spine.    COMPARISON:  07/25/2013 radiograph    FINDINGS:  No scoliosis.  No fracture.  No listhesis.  Mild spurring of the superior endplate of the L5 vertebral body is seen.  Vertebral body heights and alignment are maintained.  Sacroiliac joints are intact  Impression: As above    Electronically signed by: John Anderson MD  Date:    10/19/2018  Time:    09:58      Plan:   Iban Suarez was seen today for leg pain and foot injury.    Diagnoses and all orders for this visit:    Routine general medical examination at a health care facility  -     TSH; Future  -     Hemoglobin A1C; Future  -     Lipid Panel; Future  -     Comprehensive Metabolic Panel; Future  -     CBC Auto Differential; Future  -     PSA, Screening; Future  -     T4, Free; Future    Right foot pain  -     X-Ray Lumbar Spine AP And Lateral; Future  -     X-Ray Foot Complete Right; Future    Chronic pain of left knee  -     X-Ray Lumbar Spine AP And Lateral; Future  -     X-Ray Knee Complete 4 or More Views Left; Future    Chronic sciatica, right  -     X-Ray Lumbar Spine AP And Lateral; Future  -     X-Ray Foot Complete Right; Future  -     X-Ray Knee Complete 4 or More Views Left;  Future    BHAVIK on CPAP  -     TSH; Future  -     Hemoglobin A1C; Future  -     Lipid Panel; Future  -     Comprehensive Metabolic Panel; Future  -     CBC Auto Differential; Future  -     PSA, Screening; Future  -     T4, Free; Future  -     Microalbumin/Creatinine Ratio, Urine; Future  -     X-Ray Lumbar Spine AP And Lateral; Future    Essential hypertension  -     TSH; Future  -     Hemoglobin A1C; Future  -     Lipid Panel; Future  -     Comprehensive Metabolic Panel; Future  -     CBC Auto Differential; Future  -     PSA, Screening; Future  -     T4, Free; Future  -     Microalbumin/Creatinine Ratio, Urine; Future  -     X-Ray Lumbar Spine AP And Lateral; Future    Hyperlipidemia, unspecified hyperlipidemia type  -     TSH; Future  -     Hemoglobin A1C; Future  -     Lipid Panel; Future  -     Comprehensive Metabolic Panel; Future  -     CBC Auto Differential; Future  -     PSA, Screening; Future  -     T4, Free; Future  -     Microalbumin/Creatinine Ratio, Urine; Future  -     X-Ray Lumbar Spine AP And Lateral; Future    Prostate cancer screening  -     TSH; Future  -     Hemoglobin A1C; Future  -     Lipid Panel; Future  -     Comprehensive Metabolic Panel; Future  -     CBC Auto Differential; Future  -     T4, Free; Future  -     Microalbumin/Creatinine Ratio, Urine; Future  -     X-Ray Lumbar Spine AP And Lateral; Future    Other orders  -     Discontinue: olmesartan (BENICAR) 20 MG tablet; Take 1 tablet (20 mg total) by mouth once daily.  -     olmesartan (BENICAR) 20 MG tablet; Take 1 tablet (20 mg total) by mouth once daily.    - labs ordered. Discussed Health Maintenance issues.   Rf benicar  Lab work ordered to be completed this week.   Rx refill Olmesartan 20 mg/day for HLD treatment.   Instructed to f/u in 1 year.     There are no Patient Instructions on file for this visit.    Follow up in about 1 year (around 3/1/2024) for physical with Dr SANDERS.  Joel Attestation:   Jn MAYO am scribing  for, and in the presence of, Dr. Sylwia Cohen MD. I performed the above scribed service and the documentation accurately describes the services I performed. I attest to the accuracy of the note.    I, Dr. Sylwia Cohen MD, reviewed documentation as scribed above. I personally performed the services described in this documentation.  I agree that the record reflects my personal performance and is accurate and complete. Sylwia Cohen MD.    03/01/2023

## 2023-03-02 LAB
BASOPHILS # BLD AUTO: 0.07 K/UL (ref 0–0.2)
BASOPHILS NFR BLD: 1 % (ref 0–1.9)
COMPLEXED PSA SERPL-MCNC: 0.88 NG/ML (ref 0–4)
DIFFERENTIAL METHOD: ABNORMAL
EOSINOPHIL # BLD AUTO: 0.4 K/UL (ref 0–0.5)
EOSINOPHIL NFR BLD: 5.4 % (ref 0–8)
ERYTHROCYTE [DISTWIDTH] IN BLOOD BY AUTOMATED COUNT: 12.2 % (ref 11.5–14.5)
ESTIMATED AVG GLUCOSE: 100 MG/DL (ref 68–131)
HBA1C MFR BLD: 5.1 % (ref 4–5.6)
HCT VFR BLD AUTO: 45.9 % (ref 40–54)
HGB BLD-MCNC: 15.9 G/DL (ref 14–18)
IMM GRANULOCYTES # BLD AUTO: 0.04 K/UL (ref 0–0.04)
IMM GRANULOCYTES NFR BLD AUTO: 0.6 % (ref 0–0.5)
LYMPHOCYTES # BLD AUTO: 2.7 K/UL (ref 1–4.8)
LYMPHOCYTES NFR BLD: 39.3 % (ref 18–48)
MCH RBC QN AUTO: 32.7 PG (ref 27–31)
MCHC RBC AUTO-ENTMCNC: 34.6 G/DL (ref 32–36)
MCV RBC AUTO: 94 FL (ref 82–98)
MONOCYTES # BLD AUTO: 0.7 K/UL (ref 0.3–1)
MONOCYTES NFR BLD: 9.8 % (ref 4–15)
NEUTROPHILS # BLD AUTO: 3 K/UL (ref 1.8–7.7)
NEUTROPHILS NFR BLD: 43.9 % (ref 38–73)
NRBC BLD-RTO: 0 /100 WBC
PLATELET # BLD AUTO: 194 K/UL (ref 150–450)
PMV BLD AUTO: 11.1 FL (ref 9.2–12.9)
RBC # BLD AUTO: 4.86 M/UL (ref 4.6–6.2)
T4 FREE SERPL-MCNC: 0.87 NG/DL (ref 0.71–1.51)
TSH SERPL DL<=0.005 MIU/L-ACNC: 0.92 UIU/ML (ref 0.4–4)
WBC # BLD AUTO: 6.84 K/UL (ref 3.9–12.7)

## 2023-03-02 NOTE — PROGRESS NOTES
Iban Amos    Your labs have been reviewed. Your sugar levels, blood counts, kidney, liver functions, and thyroid functions are within goal ranges.       Your cholesterol profile has been reviewed. Based on current guidelines it is recommended that you work hard on a lower fat diet. Your risk has been calculated for you having an Atherosclerotic Cardiovascular Disease (ASCVD) event over the next 10 years. An event is defined as myocardial infarction, CHD death, or stroke. The ASCVD risk score (Blayne SWAIN Jr, et al., 2013) returns the percentage likelihood of a first time ASCVD event. Here are your numbers/criteria:                                 The 10-year ASCVD risk score (Jc CARIAS, et al., 2019) is: 2.9%    Values used to calculate the score:      Age: 43 years      Sex: Male      Is Non- : No      Diabetic: No      Tobacco smoker: No      Systolic Blood Pressure: 120 mmHg      Is BP treated: Yes      HDL Cholesterol: 48 mg/dL      Total Cholesterol: 233 mg/dL         You should modify your diet to get your cholesterol numbers down,  Any calculation >7% is recommended to go on statin medication (cholesterol lowering) therapy. Your number is < 7% so no prescription medications are recommended at this time. Working on a better lower fat diet and weight loss will be beneficial. Please let me know if you have further questions.  Sylwia Cohen MD

## 2023-03-07 ENCOUNTER — HOSPITAL ENCOUNTER (OUTPATIENT)
Dept: RADIOLOGY | Facility: HOSPITAL | Age: 43
Discharge: HOME | End: 2023-03-07
Attending: FAMILY MEDICINE
Payer: COMMERCIAL

## 2023-03-07 DIAGNOSIS — M79.671 RIGHT FOOT PAIN: ICD-10-CM

## 2023-03-07 DIAGNOSIS — E78.5 HYPERLIPIDEMIA, UNSPECIFIED HYPERLIPIDEMIA TYPE: ICD-10-CM

## 2023-03-07 DIAGNOSIS — G89.29 CHRONIC PAIN OF LEFT KNEE: ICD-10-CM

## 2023-03-07 DIAGNOSIS — M54.31 CHRONIC SCIATICA, RIGHT: ICD-10-CM

## 2023-03-07 DIAGNOSIS — G47.33 OSA ON CPAP: ICD-10-CM

## 2023-03-07 DIAGNOSIS — M25.562 CHRONIC PAIN OF LEFT KNEE: ICD-10-CM

## 2023-03-07 DIAGNOSIS — I10 ESSENTIAL HYPERTENSION: ICD-10-CM

## 2023-03-07 DIAGNOSIS — Z12.5 PROSTATE CANCER SCREENING: ICD-10-CM

## 2023-03-07 PROCEDURE — 72100 X-RAY EXAM L-S SPINE 2/3 VWS: CPT | Mod: 26,,, | Performed by: RADIOLOGY

## 2023-03-07 PROCEDURE — 73562 XR KNEE ORTHO LEFT: ICD-10-PCS | Mod: 26,LT,, | Performed by: RADIOLOGY

## 2023-03-07 PROCEDURE — 73630 X-RAY EXAM OF FOOT: CPT | Mod: 26,RT,, | Performed by: RADIOLOGY

## 2023-03-07 PROCEDURE — 73630 XR FOOT COMPLETE 3 VIEW RIGHT: ICD-10-PCS | Mod: 26,RT,, | Performed by: RADIOLOGY

## 2023-03-07 PROCEDURE — 72100 XR LUMBAR SPINE AP AND LATERAL: ICD-10-PCS | Mod: 26,,, | Performed by: RADIOLOGY

## 2023-03-07 PROCEDURE — 73560 X-RAY EXAM OF KNEE 1 OR 2: CPT | Mod: TC,FY,PO,RT

## 2023-03-07 PROCEDURE — 73560 XR KNEE ORTHO LEFT: ICD-10-PCS | Mod: 26,RT,, | Performed by: RADIOLOGY

## 2023-03-07 PROCEDURE — 72100 X-RAY EXAM L-S SPINE 2/3 VWS: CPT | Mod: TC,FY,PO

## 2023-03-07 PROCEDURE — 73562 X-RAY EXAM OF KNEE 3: CPT | Mod: 26,LT,, | Performed by: RADIOLOGY

## 2023-03-07 PROCEDURE — 73560 X-RAY EXAM OF KNEE 1 OR 2: CPT | Mod: 26,RT,, | Performed by: RADIOLOGY

## 2023-03-07 PROCEDURE — 73630 X-RAY EXAM OF FOOT: CPT | Mod: TC,FY,PO,RT

## 2023-03-08 ENCOUNTER — PATIENT MESSAGE (OUTPATIENT)
Dept: ADMINISTRATIVE | Facility: HOSPITAL | Age: 43
End: 2023-03-08
Payer: COMMERCIAL

## 2023-03-08 DIAGNOSIS — Z12.11 SCREENING FOR COLON CANCER: ICD-10-CM

## 2023-03-13 NOTE — PROGRESS NOTES
Iban Amos,     Heel spur noted on xray which can lead to heel pain and plantar fascitis. Continue with the treatment and use tuli heel cups, stretching, ice massage as we discussed in office visit. Sylwia Cohen MD

## 2023-03-13 NOTE — PROGRESS NOTES
Arthritis changes noted on xray and in particular the area that affects the sciatic nerve. Let me know if you want to see our back / spine specialists or PT next.   Sylwia Cohen MD

## 2023-04-05 ENCOUNTER — OFFICE VISIT (OUTPATIENT)
Dept: PRIMARY CARE CLINIC | Facility: CLINIC | Age: 43
End: 2023-04-05
Payer: COMMERCIAL

## 2023-04-05 ENCOUNTER — HOSPITAL ENCOUNTER (OUTPATIENT)
Dept: RADIOLOGY | Facility: HOSPITAL | Age: 43
Discharge: HOME | End: 2023-04-05
Attending: FAMILY MEDICINE
Payer: COMMERCIAL

## 2023-04-05 VITALS — BODY MASS INDEX: 29.03 KG/M2 | WEIGHT: 185 LBS | HEIGHT: 67 IN

## 2023-04-05 DIAGNOSIS — E78.5 HYPERLIPIDEMIA, UNSPECIFIED HYPERLIPIDEMIA TYPE: ICD-10-CM

## 2023-04-05 DIAGNOSIS — R22.2 SUPRACLAVICULAR FOSSA FULLNESS: Primary | ICD-10-CM

## 2023-04-05 DIAGNOSIS — M77.30 HEEL SPUR, UNSPECIFIED LATERALITY: ICD-10-CM

## 2023-04-05 DIAGNOSIS — R22.2 SUPRACLAVICULAR FOSSA FULLNESS: ICD-10-CM

## 2023-04-05 PROCEDURE — 71046 XR CHEST PA AND LATERAL: ICD-10-PCS | Mod: 26,,, | Performed by: RADIOLOGY

## 2023-04-05 PROCEDURE — 99214 PR OFFICE/OUTPT VISIT, EST, LEVL IV, 30-39 MIN: ICD-10-PCS | Mod: 95,,, | Performed by: FAMILY MEDICINE

## 2023-04-05 PROCEDURE — 71046 X-RAY EXAM CHEST 2 VIEWS: CPT | Mod: TC,FY,PO

## 2023-04-05 PROCEDURE — 99214 OFFICE O/P EST MOD 30 MIN: CPT | Mod: 95,,, | Performed by: FAMILY MEDICINE

## 2023-04-05 PROCEDURE — 71046 X-RAY EXAM CHEST 2 VIEWS: CPT | Mod: 26,,, | Performed by: RADIOLOGY

## 2023-04-05 NOTE — PROGRESS NOTES
Subjective:      Patient ID: Iban Amos is a 43 y.o. male.    Chief Complaint: Mass (Back of neck. Pt states noticed lump on 4/1/23. Pt denies pain and drainage. Soft to touch)    The patient location is: home  Visit type: video and audio simultaneous    Patient is a 43 y.o. male coming in today  has a past medical history of Sleep apnea and Unspecified essential hypertension.    Pt presents via telemedicine c/o soft lump at base of neck.   Reports sx onset 5 days ago. States lump started small and has grown since then. Denies recent injury or trauma. Lump is soft in sensation. Denies tenderness with ROM. States this does not feel like swollen lymph nodes. Recent lab work showed blood count WNL. Reports knee and back pain are stable at this time. Has been stretching and using heel cuffs for heel spur treatment.     [facility] Reason For Visit    4/4/2023  9:35 PM CDT - Filed by Patient   What is your primary reason for visit? Other/Annual   Have you experienced any of the following:   Change in activity? No   Unexpected weight change? No   Neck pain? No   Hearing loss? No   Runny nose? No   Trouble swallowing? No   Eye discharge? No   Changes in vision? No   Chest tightness? No   Wheezing? No   Chest pain? No   Heart beating fast or racing? No   Blood in stool? No   Constipation? No   Vomiting? No   Diarrhea? No   Drinking much more than usual? No   Urinating much more than usual? No   Difficulty urinating? No   Have a sudden urge to urinate? No   Blood in the urine? No   Joint swelling? No   Joint pain? No   Headaches? No   Weakness? No   Confusion? No   Feeling depressed? No     [facility] Peq Documents    4/4/2023  9:36 PM CDT - Filed by Patient   Would you like a copy of [facility]'s Financial Assistance Policy Summary? No, I would not like a copy.   Is this visit work-related or due to a work-related accident/injury? No   Would you like to receive more information regarding your rights and protections under the  [employer] Billing act? No, I would not.     [facility] Peq [facility]    4/4/2023  9:38 PM CDT - Filed by Patient   On average, how many days per week do you engage in moderate to strenuous exercise (like a brisk walk)? 2 days   On average, how many minutes do you engage in exercise at this level? 30 min   Do you feel stress - tense, restless, nervous, or anxious, or unable to sleep at night because your mind is troubled all the time - these days? Not at all   Do you belong to any clubs or organizations such as [employer] groups, unions, fraternal or athletic groups, or school groups? Yes   How often do you attend meetings of the clubs or organizations you belong to? More than 4 times per year   In a typical week, how many times do you talk on the phone with family, friends, or neighbors? Twice a week   How often do you get together with friends or relatives? Once a week   Are you , , , , never , or living with a partner?    How hard is it for you to pay for the very basics like food, housing, medical care, and heating? Not very hard   Within the past 12 months, you worried that your food would run out before you got the money to buy more. Never true   Within the past 12 months, the food you bought just didnt last and you didnt have money to get more. Never true   In the past 12 months, has lack of transportation kept you from medical appointments or from getting medications? No   In the past 12 months, has lack of transportation kept you from meetings, work, or from getting things needed for daily living? No   How often do you have a drink containing alcohol? Monthly or less   How many drinks containing alcohol do you have on a typical day when you are drinking? 1 or 2   How often do you have six or more drinks on one occasion? Never   In the last 12 months, was there a time when you were not able to pay the mortgage or rent on time? No   In the last 12 months, how many places have  you lived? (range: at least 0) 1   In the last 12 months, was there a time when you did not have a steady place to sleep or slept in a shelter (including now)? No         Pmh, Psh, Family Hx, Social Hx updated in Epic Tabs today.     LABS:   Lab Results   Component Value Date    WBC 6.84 03/01/2023    HGB 15.9 03/01/2023    HCT 45.9 03/01/2023     03/01/2023    CHOL 233 (H) 03/01/2023    TRIG 264 (H) 03/01/2023    HDL 48 03/01/2023    ALT 52 (H) 03/01/2023    AST 29 03/01/2023     03/01/2023    K 4.0 03/01/2023     03/01/2023    CREATININE 0.9 03/01/2023    BUN 15 03/01/2023    CO2 26 03/01/2023    TSH 0.923 03/01/2023    PSA 0.88 03/01/2023    HGBA1C 5.1 03/01/2023       X-ray Knee Ortho Left  Narrative: EXAM: XR KNEE ORTHO LEFT    CLINICAL HISTORY: Left knee pain.    Technique: Left knee x-ray 3 views    COMPARISON: None    FINDINGS:    LEFT knee: Normal bony mineralization.  Joint spaces well-preserved in all 3 compartments.  Normal soft tissues.  Impression: 1. Negative left knee x-ray.    Finalized on: 3/7/2023 2:13 PM By:  Renard Angel MD  [facility]# [id number]      2023-03-07 14:15:20.839    [facility]  X-Ray Lumbar Spine AP And Lateral  Narrative: EXAM: XR LUMBAR SPINE AP AND LATERAL    CLINICAL HISTORY:  [M79.671]-Pain in right foot./[M25.562]-Pain in left knee./[G89.29]-Other chronic pain./[M54.31]-Sciatica, right side./[G47.33]-Obstructive sleep apnea (adult) (pediatric)./[Z99.89]-Dependence on other enabling machines and devices./[I10]-Essential    Technique: Lumbar spine x-ray 3 views    COMPARISON: None.    FINDINGS:    Alignment: Minimal thoracolumbar scoliosis, convex to the right and centered at the T12 level.    Vertebral bodies: Normal vertebral body heights.  No fracture.  No osseous lesion.    Disc spaces: Mild disc space narrowing L5-S1.  Remaining disc spaces well-preserved.  Very small anterior osteophytes L2-3, L3-4 and L4-5.    Soft tissues: Normal prevertebral soft tissues.   "Supporting soft tissues also normal.    Other findings: None.  Impression: 1. Minimal dextro scoliosis.  2.  Minimal degenerative change.    Finalized on: 3/7/2023 2:12 PM By:  Renard Angel MD  [facility]# [id number]      2023-03-07 14:14:40.796    [facility]  X-Ray Foot Complete Right  Narrative: EXAM: XR FOOT COMPLETE 3 VIEW RIGHT    CLINICAL HISTORY:  [M79.671]-Pain in right foot./[M54.31]-Sciatica, right side.     Technique: Right foot x-ray, 3 views    COMPARISON: None    FINDINGS:    Bones:  No fracture/dislocation or osseous lesion.  Small plantar calcaneal enthesophyte.    Joint spaces:  Well-preserved.    Soft tissues: Normal.  Impression: 1. Small plantar calcaneal enthesophyte.  Otherwise negative right foot x-ray.    Finalized on: 3/7/2023 2:11 PM By:  Renard Angel MD  [facility]# [id number]      2023-03-07 14:13:40.747    [facility]        Review of Systems   Constitutional:  Negative for activity change and unexpected weight change.   HENT:  Negative for hearing loss, rhinorrhea and trouble swallowing.    Eyes:  Negative for discharge and visual disturbance.   Respiratory:  Negative for chest tightness and wheezing.    Cardiovascular:  Negative for chest pain and palpitations.   Gastrointestinal:  Negative for blood in stool, constipation, diarrhea and vomiting.   Endocrine: Negative for polydipsia and polyuria.   Genitourinary:  Negative for difficulty urinating, hematuria and urgency.   Musculoskeletal:  Negative for arthralgias, joint swelling and neck pain.   Neurological:  Negative for weakness and headaches.   Psychiatric/Behavioral:  Negative for confusion and dysphoric mood.    Objective:     Vitals:    04/05/23 0742   Weight: 83.9 kg (185 lb)   Height: 5' 7" (1.702 m)     Wt Readings from Last 10 Encounters:   04/05/23 83.9 kg (185 lb)   03/01/23 88 kg (194 lb 0.1 oz)   12/01/21 86.1 kg (189 lb 13.1 oz)   10/03/21 79.4 kg (175 lb)   11/02/20 82.1 kg (181 lb)   09/29/20 82.6 kg (181 lb 15.8 oz)   04/03/19 82.8 " kg (182 lb 8.7 oz)   10/19/18 81.1 kg (178 lb 12.7 oz)   09/27/18 83.7 kg (184 lb 8.4 oz)   09/21/18 83.2 kg (183 lb 6.8 oz)     Physical Exam  Vitals reviewed.   Constitutional:       General: He is awake. He is not in acute distress.     Appearance: Normal appearance. He is well-developed, well-groomed and normal weight. He is not ill-appearing.   HENT:      Head: Normocephalic and atraumatic.      Right Ear: External ear normal.      Left Ear: External ear normal.      Nose: Nose normal.      Mouth/Throat:      Lips: Pink.   Eyes:      Conjunctiva/sclera: Conjunctivae normal.   Pulmonary:      Effort: Pulmonary effort is normal.   Neurological:      Mental Status: He is alert.   Psychiatric:         Attention and Perception: Attention and perception normal. He is attentive.         Mood and Affect: Mood and affect normal. Mood is not anxious or depressed. Affect is not labile, blunt, angry or inappropriate.         Speech: Speech normal. He is communicative. Speech is not rapid and pressured, delayed, slurred or tangential.         Behavior: Behavior normal. Behavior is not agitated, slowed, aggressive, withdrawn, hyperactive or combative. Behavior is cooperative.         Thought Content: Thought content normal. Thought content is not paranoid or delusional. Thought content does not include homicidal or suicidal ideation. Thought content does not include homicidal or suicidal plan.         Cognition and Memory: Cognition and memory normal. Memory is not impaired. He does not exhibit impaired recent memory or impaired remote memory.         Judgment: Judgment normal. Judgment is not impulsive or inappropriate.     Assessment:     1. Supraclavicular fossa fullness    2. Hyperlipidemia, unspecified hyperlipidemia type      Plan:   Iban Suarez was seen today for mass.    Diagnoses and all orders for this visit:    Supraclavicular fossa fullness  -     X-Ray Chest PA And Lateral; Future  -     US Soft Tissue Head  Neck Thyroid; Future    Hyperlipidemia, unspecified hyperlipidemia type      Fossa fullness is New Problem, discussed symptoms, work up, suspected diagnosis. Will place order for CXR to be completed today.   .  Once workup has been reviewed will make adjustments if needed.   Pt will f/u in 2 weeks for neck CT if not improving.   Heel spur is improved. Instructed to continue with exercises and heel cuff for treatment.   Cont to work on diet and lower triglycerides         Face to Face time with patient: 7:43 AM-7:52 AM        25   minutes of total time spent on the encounter, which includes face to face time and non-face to face time preparing to see the patient (eg, review of tests), Obtaining and/or reviewing separately obtained history, Documenting clinical information in the electronic or other health record, Independently interpreting results (not separately reported) and communicating results to the patient/family/caregiver, or Care coordination (not separately reported).     Each patient to whom he or she provides medical services by telemedicine is:  (1) informed of the relationship between the physician and patient and the respective role of any other health care provider with respect to management of the patient; and (2) notified that he or she may decline to receive medical services by telemedicine and may withdraw from such care at any time.      Follow up if symptoms worsen or fail to improve in 2 weeks to do ct neck / chest.    There are no Patient Instructions on file for this visit.    Scribe Attestation:   I, Jn Figueroa, am scribing for, and in the presence of, Dr. Sylwia Cohen MD. I performed the above scribed service and the documentation accurately describes the services I performed. I attest to the accuracy of the note.    I, Dr. Sylwia Cohen MD, reviewed documentation as scribed above. I personally performed the services described in this documentation.  I agree that the record  reflects my personal performance and is accurate and complete. Sylwia Cohen MD.    04/05/2023

## 2023-04-10 ENCOUNTER — HOSPITAL ENCOUNTER (OUTPATIENT)
Dept: RADIOLOGY | Facility: HOSPITAL | Age: 43
Discharge: HOME | End: 2023-04-10
Attending: FAMILY MEDICINE
Payer: COMMERCIAL

## 2023-04-10 DIAGNOSIS — R22.2 SUPRACLAVICULAR FOSSA FULLNESS: ICD-10-CM

## 2023-04-10 PROCEDURE — 76536 US SOFT TISSUE HEAD NECK THYROID: ICD-10-PCS | Mod: 26,,, | Performed by: RADIOLOGY

## 2023-04-10 PROCEDURE — 76536 US EXAM OF HEAD AND NECK: CPT | Mod: 26,,, | Performed by: RADIOLOGY

## 2023-04-10 PROCEDURE — 76536 US EXAM OF HEAD AND NECK: CPT | Mod: TC,PO

## 2023-04-10 NOTE — PROGRESS NOTES
FINDINGS:  No prior study.  Targeted ultrasound right supraclavicular fossa reveals no soft tissue mass or worrisome lymphadenopathy.  There is a ovoid anechoic focus, 0.5 x 0.2 x 0.5 cm, without color Doppler flow, a tiny lymph node or cyst.    Sylwia Cohen MD

## 2023-04-10 NOTE — PROGRESS NOTES
Chest xray is normal.     Watch the area and if it doesn't decrease in size over the next 3-4 weeks, let me know and we can do CT neck if needed.   Sylwia Cohen MD

## 2023-04-19 ENCOUNTER — PATIENT MESSAGE (OUTPATIENT)
Dept: ADMINISTRATIVE | Facility: HOSPITAL | Age: 43
End: 2023-04-19
Payer: COMMERCIAL

## 2024-02-20 ENCOUNTER — PATIENT MESSAGE (OUTPATIENT)
Dept: PRIMARY CARE CLINIC | Facility: CLINIC | Age: 44
End: 2024-02-20
Payer: COMMERCIAL

## 2024-02-20 DIAGNOSIS — Z00.00 ENCOUNTER FOR WELLNESS EXAMINATION IN ADULT: Primary | ICD-10-CM

## 2024-02-23 ENCOUNTER — LAB VISIT (OUTPATIENT)
Dept: LAB | Facility: HOSPITAL | Age: 44
End: 2024-02-23
Attending: FAMILY MEDICINE
Payer: COMMERCIAL

## 2024-02-23 DIAGNOSIS — Z00.00 ENCOUNTER FOR WELLNESS EXAMINATION IN ADULT: ICD-10-CM

## 2024-02-23 LAB
ALBUMIN SERPL BCP-MCNC: 4.3 G/DL (ref 3.5–5.2)
ALP SERPL-CCNC: 84 U/L (ref 55–135)
ALT SERPL W/O P-5'-P-CCNC: 78 U/L (ref 10–44)
ANION GAP SERPL CALC-SCNC: 12 MMOL/L (ref 8–16)
AST SERPL-CCNC: 39 U/L (ref 10–40)
BASOPHILS # BLD AUTO: 0.05 K/UL (ref 0–0.2)
BASOPHILS NFR BLD: 0.7 % (ref 0–1.9)
BILIRUB SERPL-MCNC: 0.5 MG/DL (ref 0.1–1)
BUN SERPL-MCNC: 14 MG/DL (ref 6–20)
CALCIUM SERPL-MCNC: 9.4 MG/DL (ref 8.7–10.5)
CHLORIDE SERPL-SCNC: 105 MMOL/L (ref 95–110)
CHOLEST SERPL-MCNC: 210 MG/DL (ref 120–199)
CHOLEST/HDLC SERPL: 5.1 {RATIO} (ref 2–5)
CO2 SERPL-SCNC: 24 MMOL/L (ref 23–29)
COMPLEXED PSA SERPL-MCNC: 0.92 NG/ML (ref 0–4)
CREAT SERPL-MCNC: 0.8 MG/DL (ref 0.5–1.4)
DIFFERENTIAL METHOD BLD: ABNORMAL
EOSINOPHIL # BLD AUTO: 0.3 K/UL (ref 0–0.5)
EOSINOPHIL NFR BLD: 4.4 % (ref 0–8)
ERYTHROCYTE [DISTWIDTH] IN BLOOD BY AUTOMATED COUNT: 12.8 % (ref 11.5–14.5)
EST. GFR  (NO RACE VARIABLE): >60 ML/MIN/1.73 M^2
ESTIMATED AVG GLUCOSE: 97 MG/DL (ref 68–131)
GLUCOSE SERPL-MCNC: 65 MG/DL (ref 70–110)
HBA1C MFR BLD: 5 % (ref 4–5.6)
HCT VFR BLD AUTO: 48 % (ref 40–54)
HDLC SERPL-MCNC: 41 MG/DL (ref 40–75)
HDLC SERPL: 19.5 % (ref 20–50)
HGB BLD-MCNC: 16.7 G/DL (ref 14–18)
IMM GRANULOCYTES # BLD AUTO: 0.02 K/UL (ref 0–0.04)
IMM GRANULOCYTES NFR BLD AUTO: 0.3 % (ref 0–0.5)
LDLC SERPL CALC-MCNC: 126.6 MG/DL (ref 63–159)
LYMPHOCYTES # BLD AUTO: 3.4 K/UL (ref 1–4.8)
LYMPHOCYTES NFR BLD: 46.8 % (ref 18–48)
MCH RBC QN AUTO: 32.8 PG (ref 27–31)
MCHC RBC AUTO-ENTMCNC: 34.8 G/DL (ref 32–36)
MCV RBC AUTO: 94 FL (ref 82–98)
MONOCYTES # BLD AUTO: 0.6 K/UL (ref 0.3–1)
MONOCYTES NFR BLD: 8.4 % (ref 4–15)
NEUTROPHILS # BLD AUTO: 2.9 K/UL (ref 1.8–7.7)
NEUTROPHILS NFR BLD: 39.4 % (ref 38–73)
NONHDLC SERPL-MCNC: 169 MG/DL
NRBC BLD-RTO: 0 /100 WBC
PLATELET # BLD AUTO: 173 K/UL (ref 150–450)
PMV BLD AUTO: 11.3 FL (ref 9.2–12.9)
POTASSIUM SERPL-SCNC: 4 MMOL/L (ref 3.5–5.1)
PROT SERPL-MCNC: 7.6 G/DL (ref 6–8.4)
RBC # BLD AUTO: 5.09 M/UL (ref 4.6–6.2)
SODIUM SERPL-SCNC: 141 MMOL/L (ref 136–145)
TRIGL SERPL-MCNC: 212 MG/DL (ref 30–150)
TSH SERPL DL<=0.005 MIU/L-ACNC: 1.56 UIU/ML (ref 0.4–4)
WBC # BLD AUTO: 7.27 K/UL (ref 3.9–12.7)

## 2024-02-23 PROCEDURE — 83036 HEMOGLOBIN GLYCOSYLATED A1C: CPT | Performed by: FAMILY MEDICINE

## 2024-02-23 PROCEDURE — 85025 COMPLETE CBC W/AUTO DIFF WBC: CPT | Performed by: FAMILY MEDICINE

## 2024-02-23 PROCEDURE — 84443 ASSAY THYROID STIM HORMONE: CPT | Performed by: FAMILY MEDICINE

## 2024-02-23 PROCEDURE — 80061 LIPID PANEL: CPT | Performed by: FAMILY MEDICINE

## 2024-02-23 PROCEDURE — 84153 ASSAY OF PSA TOTAL: CPT | Performed by: FAMILY MEDICINE

## 2024-02-23 PROCEDURE — 80053 COMPREHEN METABOLIC PANEL: CPT | Performed by: FAMILY MEDICINE

## 2024-02-23 PROCEDURE — 36415 COLL VENOUS BLD VENIPUNCTURE: CPT | Mod: PO | Performed by: FAMILY MEDICINE

## 2024-03-04 ENCOUNTER — OFFICE VISIT (OUTPATIENT)
Dept: PRIMARY CARE CLINIC | Facility: CLINIC | Age: 44
End: 2024-03-04
Payer: COMMERCIAL

## 2024-03-04 ENCOUNTER — LAB VISIT (OUTPATIENT)
Dept: LAB | Facility: HOSPITAL | Age: 44
End: 2024-03-04
Attending: FAMILY MEDICINE
Payer: COMMERCIAL

## 2024-03-04 VITALS
SYSTOLIC BLOOD PRESSURE: 118 MMHG | BODY MASS INDEX: 30.67 KG/M2 | RESPIRATION RATE: 18 BRPM | HEART RATE: 60 BPM | WEIGHT: 195.44 LBS | HEIGHT: 67 IN | OXYGEN SATURATION: 97 % | TEMPERATURE: 97 F | DIASTOLIC BLOOD PRESSURE: 82 MMHG

## 2024-03-04 DIAGNOSIS — R74.8 ELEVATED LIVER ENZYMES: ICD-10-CM

## 2024-03-04 DIAGNOSIS — Z00.00 ROUTINE GENERAL MEDICAL EXAMINATION AT A HEALTH CARE FACILITY: Primary | ICD-10-CM

## 2024-03-04 DIAGNOSIS — I10 ESSENTIAL HYPERTENSION: ICD-10-CM

## 2024-03-04 DIAGNOSIS — E78.5 HYPERLIPIDEMIA, UNSPECIFIED HYPERLIPIDEMIA TYPE: ICD-10-CM

## 2024-03-04 DIAGNOSIS — Z12.11 COLON CANCER SCREENING: ICD-10-CM

## 2024-03-04 DIAGNOSIS — G47.33 OSA ON CPAP: ICD-10-CM

## 2024-03-04 PROCEDURE — 36415 COLL VENOUS BLD VENIPUNCTURE: CPT | Mod: PO | Performed by: FAMILY MEDICINE

## 2024-03-04 PROCEDURE — 82977 ASSAY OF GGT: CPT | Performed by: FAMILY MEDICINE

## 2024-03-04 PROCEDURE — 99396 PREV VISIT EST AGE 40-64: CPT | Mod: S$GLB,,, | Performed by: FAMILY MEDICINE

## 2024-03-04 PROCEDURE — 99999 PR PBB SHADOW E&M-EST. PATIENT-LVL IV: CPT | Mod: PBBFAC,,, | Performed by: FAMILY MEDICINE

## 2024-03-04 PROCEDURE — 80076 HEPATIC FUNCTION PANEL: CPT | Performed by: FAMILY MEDICINE

## 2024-03-04 RX ORDER — OLMESARTAN MEDOXOMIL 20 MG/1
20 TABLET ORAL DAILY
Qty: 90 TABLET | Refills: 3 | Status: SHIPPED | OUTPATIENT
Start: 2024-03-04

## 2024-03-04 NOTE — PROGRESS NOTES
Subjective:      Patient ID: Iban Amos is a 44 y.o. male.    Chief Complaint: Annual Exam      Patient is a 44 y.o. male coming in today for annual exam.   He continues to use CPAP regularly but has been having issues with machine recently. Recent lab work results reviewed with pt. No signs of anemia or infection. Kidney function and liver test have improved since last visit but liver enzymes continue to be elevated. Pt states he has lowered alcohol intake in the past year. Cholesterol levels were slightly elevated; he endorses eating red meat 3 times a week. He is due for colonoscopy. Reports normal bowel movements. No other health concern at this time.       1. Routine general medical examination at a health care facility    2. Essential hypertension    3. Hyperlipidemia, unspecified hyperlipidemia type    4. BHAVIK on CPAP    5. Elevated liver enzymes    6. Colon cancer screening       [facility] Peq [facility]    4/4/2023  9:38 PM CDT - Filed by Patient   On average, how many days per week do you engage in moderate to strenuous exercise (like a brisk walk)? 2 days   On average, how many minutes do you engage in exercise at this level? 30 min   Do you feel stress - tense, restless, nervous, or anxious, or unable to sleep at night because your mind is troubled all the time - these days? Not at all   Do you belong to any clubs or organizations such as Mand[employer] groups, unions, fraternal or athletic groups, or school groups? Yes   How often do you attend meetings of the clubs or organizations you belong to? More than 4 times per year   In a typical week, how many times do you talk on the phone with family, friends, or neighbors? Twice a week   How often do you get together with friends or relatives? Once a week   Are you , , , , never , or living with a partner?    How hard is it for you to pay for the very basics like food, housing, medical care, and heating? Not very hard    Within the past 12 months, you worried that your food would run out before you got the money to buy more. Never true   Within the past 12 months, the food you bought just didnt last and you didnt have money to get more. Never true   In the past 12 months, has lack of transportation kept you from medical appointments or from getting medications? No   In the past 12 months, has lack of transportation kept you from meetings, work, or from getting things needed for daily living? No   How often do you have a drink containing alcohol? Monthly or less   How many drinks containing alcohol do you have on a typical day when you are drinking? 1 or 2   How often do you have six or more drinks on one occasion? Never   In the last 12 months, was there a time when you were not able to pay the mortgage or rent on time? No   In the last 12 months, how many places have you lived? (range: at least 0) 1   In the last 12 months, was there a time when you did not have a steady place to sleep or slept in a shelter (including now)? No         Pmh, Psh, Family Hx, Social Hx, HM updated in Epic Tabs today.   Review of Systems   Constitutional:  Negative for activity change, appetite change, chills, fatigue and unexpected weight change.   HENT:  Negative for congestion, ear pain, postnasal drip, sneezing, sore throat and trouble swallowing.    Eyes:  Negative for pain and visual disturbance.   Respiratory:  Negative for cough and shortness of breath.    Cardiovascular:  Negative for chest pain and leg swelling.   Gastrointestinal:  Negative for abdominal pain, constipation, diarrhea, nausea and vomiting.   Endocrine: Negative for cold intolerance and heat intolerance.   Genitourinary:  Negative for difficulty urinating, dysuria and flank pain.   Musculoskeletal:  Negative for arthralgias, back pain, joint swelling and neck pain.   Skin:  Negative for color change and rash.   Neurological:  Negative for dizziness, seizures and headaches.  "  Psychiatric/Behavioral:  Negative for behavioral problems, dysphoric mood and sleep disturbance. The patient is not nervous/anxious.      Objective:     Vitals:    03/04/24 1439   BP: 118/82   BP Location: Left arm   Patient Position: Sitting   BP Method: Large (Manual)   Pulse: 60   Resp: 18   Temp: 96.6 °F (35.9 °C)   TempSrc: Tympanic   SpO2: 97%   Weight: 88.6 kg (195 lb 7 oz)   Height: 5' 7" (1.702 m)     Wt Readings from Last 10 Encounters:   03/04/24 88.6 kg (195 lb 7 oz)   04/05/23 83.9 kg (185 lb)   03/01/23 88 kg (194 lb 0.1 oz)   12/01/21 86.1 kg (189 lb 13.1 oz)   10/03/21 79.4 kg (175 lb)   11/02/20 82.1 kg (181 lb)   09/29/20 82.6 kg (181 lb 15.8 oz)   04/03/19 82.8 kg (182 lb 8.7 oz)   10/19/18 81.1 kg (178 lb 12.7 oz)   09/27/18 83.7 kg (184 lb 8.4 oz)     Physical Exam  Vitals reviewed.   Constitutional:       General: He is not in acute distress.     Appearance: Normal appearance. He is well-developed. He is obese.   HENT:      Head: Normocephalic and atraumatic.      Right Ear: Tympanic membrane and external ear normal.      Left Ear: Tympanic membrane and external ear normal.      Nose: Nose normal.      Mouth/Throat:      Mouth: Mucous membranes are moist.      Pharynx: Oropharynx is clear.   Eyes:      Conjunctiva/sclera: Conjunctivae normal.      Pupils: Pupils are equal, round, and reactive to light.   Neck:      Thyroid: No thyromegaly.   Cardiovascular:      Rate and Rhythm: Normal rate and regular rhythm.      Heart sounds: No murmur heard.     No friction rub. No gallop.   Pulmonary:      Effort: Pulmonary effort is normal. No respiratory distress.      Breath sounds: Normal breath sounds.   Abdominal:      General: Bowel sounds are normal. There is no distension.      Palpations: Abdomen is soft.      Tenderness: There is no abdominal tenderness. There is no rebound.   Musculoskeletal:         General: Normal range of motion.      Cervical back: Normal range of motion and neck supple. "   Lymphadenopathy:      Cervical: No cervical adenopathy.   Skin:     General: Skin is warm and dry.   Neurological:      General: No focal deficit present.      Mental Status: He is alert and oriented to person, place, and time.      Coordination: Coordination normal.   Psychiatric:         Attention and Perception: Attention normal.         Mood and Affect: Mood and affect normal.         Speech: Speech normal.         Behavior: Behavior normal.         Thought Content: Thought content normal.         Cognition and Memory: Cognition normal.         Judgment: Judgment normal.         Assessment:     1. Routine general medical examination at a health care facility    2. Essential hypertension    3. Hyperlipidemia, unspecified hyperlipidemia type    4. BHAVIK on CPAP    5. Elevated liver enzymes    6. Colon cancer screening        Plan:   Iban Suarez was seen today for annual exam.    Diagnoses and all orders for this visit:    Routine general medical examination at a health care facility    Essential hypertension  -     Ambulatory referral/consult to Pulmonology; Future  -     olmesartan (BENICAR) 20 MG tablet; Take 1 tablet (20 mg total) by mouth once daily.    Hyperlipidemia, unspecified hyperlipidemia type    BHAVIK on CPAP  -     Ambulatory referral/consult to Pulmonology; Future    Elevated liver enzymes  -     US Abdomen Complete; Future  -     Gamma GT; Future  -     HEPATIC FUNCTION PANEL; Future    Colon cancer screening  -     Cologuard Screening (Multitarget Stool DNA); Future  -     Cologuard Screening (Multitarget Stool DNA)      BHAVIK is controlled with CPAP; referral given to pulmonology for further assistance with CPAP machine.   Liver enzymes are still elevated; abd US and hepatic function panel ordered to be completed this month to assess liver.   HTN is stable today; continue with medication as prescribed.   Refilled Rx Benicar 20 mg/day for HTN treatment.   Colorguard ordered for pt to complete at  home.   Advised on dietary changes and regular exercise to help with improvement in cholesterol levels.   Instructed to f/u in 1 year.     There are no Patient Instructions on file for this visit.    Follow up in about 1 year (around 3/4/2025) for physical with Dr SANDERS.      LABS:   Lab Results   Component Value Date    HGBA1C 5.0 02/23/2024    HGBA1C 5.1 03/01/2023    HGBA1C 5.0 12/01/2021      Lab Results   Component Value Date    CHOL 210 (H) 02/23/2024    CHOL 233 (H) 03/01/2023    CHOL 224 (H) 12/01/2021     Lab Results   Component Value Date    LDLCALC 126.6 02/23/2024    LDLCALC 132.2 03/01/2023    LDLCALC 124.6 12/01/2021     Lab Results   Component Value Date    WBC 7.27 02/23/2024    HGB 16.7 02/23/2024    HCT 48.0 02/23/2024     02/23/2024    CHOL 210 (H) 02/23/2024    TRIG 212 (H) 02/23/2024    HDL 41 02/23/2024    ALT 78 (H) 02/23/2024    AST 39 02/23/2024     02/23/2024    K 4.0 02/23/2024     02/23/2024    CREATININE 0.8 02/23/2024    BUN 14 02/23/2024    CO2 24 02/23/2024    TSH 1.564 02/23/2024    PSA 0.92 02/23/2024    HGBA1C 5.0 02/23/2024       The 10-year ASCVD risk score (Jc DK, et al., 2019) is: 3.2%    Values used to calculate the score:      Age: 44 years      Sex: Male      Is Non- : No      Diabetic: No      Tobacco smoker: No      Systolic Blood Pressure: 118 mmHg      Is BP treated: Yes      HDL Cholesterol: 41 mg/dL      Total Cholesterol: 210 mg/dL  US Soft Tissue Head Neck Thyroid  Narrative: EXAM: US SOFT TISSUE HEAD NECK THYROID    CLINICAL HISTORY:  Localized swelling, mass and lump, neck.    TECHNIQUE: Routine soft tissue ultrasound neck.    FINDINGS:  No prior study.  Targeted ultrasound right supraclavicular fossa reveals no soft tissue mass or worrisome lymphadenopathy.  There is a ovoid anechoic focus, 0.5 x 0.2 x 0.5 cm, without color Doppler flow, a tiny lymph node or cyst.  Impression:  See above.    Finalized on: 4/10/2023  2:43 PM By:  August Cabrera MD  R# [id number]      2023-04-10 14:45:25.503    [facility]    Scribe Attestation:   I, Jn Figueroa, am scribing for, and in the presence of, Dr. Sylwai Cohen MD. I performed the above scribed service and the documentation accurately describes the services I performed. I attest to the accuracy of the note.    I, Dr. Sylwia Cohen MD, reviewed documentation as scribed above. I personally performed the services described in this documentation.  I agree that the record reflects my personal performance and is accurate and complete. Sylwia Cohen MD.    03/04/2024

## 2024-03-05 ENCOUNTER — PATIENT MESSAGE (OUTPATIENT)
Dept: PULMONOLOGY | Facility: CLINIC | Age: 44
End: 2024-03-05
Payer: COMMERCIAL

## 2024-03-05 LAB
ALBUMIN SERPL BCP-MCNC: 4.2 G/DL (ref 3.5–5.2)
ALP SERPL-CCNC: 87 U/L (ref 55–135)
ALT SERPL W/O P-5'-P-CCNC: 37 U/L (ref 10–44)
AST SERPL-CCNC: 23 U/L (ref 10–40)
BILIRUB DIRECT SERPL-MCNC: 0.2 MG/DL (ref 0.1–0.3)
BILIRUB SERPL-MCNC: 0.5 MG/DL (ref 0.1–1)
GGT SERPL-CCNC: 38 U/L (ref 8–55)
PROT SERPL-MCNC: 7.6 G/DL (ref 6–8.4)

## 2024-03-07 ENCOUNTER — HOSPITAL ENCOUNTER (OUTPATIENT)
Dept: RADIOLOGY | Facility: HOSPITAL | Age: 44
Discharge: HOME | End: 2024-03-07
Attending: FAMILY MEDICINE
Payer: COMMERCIAL

## 2024-03-07 DIAGNOSIS — R74.8 ELEVATED LIVER ENZYMES: ICD-10-CM

## 2024-03-07 PROCEDURE — 76700 US EXAM ABDOM COMPLETE: CPT | Mod: TC,PO

## 2024-03-07 PROCEDURE — 76700 US EXAM ABDOM COMPLETE: CPT | Mod: 26,,, | Performed by: RADIOLOGY

## 2024-03-07 NOTE — PROGRESS NOTES
Liver enzymes better. Proceed still with imaging but try to refrain from alcohol at this time. Sylwia Cohen MD

## 2024-03-07 NOTE — PROGRESS NOTES
1.    Hepatic steatosis.- fatty liver   2.    Nonobstructing 5 mm left renal calculus. - kidney stone    Work on weight loss, no alcohol, lower fat diet to help with fatty liver. Enzymes already improving. Continue with this. Can get fibroscan to determine if scarring vs only fatty changes.     Kidney stones noted, so drink plenty of water. Limit sodas. F/u with urology if symptoms occur. Sylwia Cohen MD

## 2024-03-11 ENCOUNTER — OFFICE VISIT (OUTPATIENT)
Dept: PULMONOLOGY | Facility: CLINIC | Age: 44
End: 2024-03-11
Payer: COMMERCIAL

## 2024-03-11 VITALS
OXYGEN SATURATION: 94 % | WEIGHT: 192.88 LBS | SYSTOLIC BLOOD PRESSURE: 130 MMHG | RESPIRATION RATE: 17 BRPM | DIASTOLIC BLOOD PRESSURE: 90 MMHG | HEIGHT: 67 IN | BODY MASS INDEX: 30.27 KG/M2 | HEART RATE: 63 BPM

## 2024-03-11 DIAGNOSIS — F51.11: ICD-10-CM

## 2024-03-11 DIAGNOSIS — I10 ESSENTIAL HYPERTENSION: ICD-10-CM

## 2024-03-11 DIAGNOSIS — E78.49 OTHER HYPERLIPIDEMIA: ICD-10-CM

## 2024-03-11 DIAGNOSIS — G47.33 OSA ON CPAP: Primary | ICD-10-CM

## 2024-03-11 PROCEDURE — 99205 OFFICE O/P NEW HI 60 MIN: CPT | Mod: S$GLB,,, | Performed by: INTERNAL MEDICINE

## 2024-03-11 PROCEDURE — 99999 PR PBB SHADOW E&M-EST. PATIENT-LVL III: CPT | Mod: PBBFAC,,, | Performed by: INTERNAL MEDICINE

## 2024-03-11 NOTE — PROGRESS NOTES
Pulmonary Outpatient  Visit     Subjective:       Patient ID: Iban Amos is a 44 y.o. male.    Social History     Tobacco Use   Smoking Status Never   Smokeless Tobacco Never            Chief Complaint: Sleep Apnea        Iban Amos is 44 y.o.  Referral made for BHAVIK  Referred by Dr. Lopez   Last office visit was in 2018   He started canals were reviewed   Patient has 2 CPAP machines   Current machine auto PAP 7.5-12 cm water pressure   Uses a nasal mask   We are unable to obtain data from his CAD   Weight has changed from 184-192 lb.    On the weekdays bedtime is 10:00 p.m. wake up time is 6:00 a.m..  He functions pretty well during the day.    Is mostly concerned that in the evenings he will fall asleep at the drop of a hat.    On the weekends he goes to bed later 11:00 p.m. to 12 midnight and wakes up at 10:00 a.m. to 11:00 a.m..    He has no trouble staying alert during chose.    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    Has no lapses in consciousness or alertness when he is at work.    Most recent labs were reviewed   Patient tells me that had several sleep studies in the past that is prior to 2012.    On 1 of the sleep studies he did not meet strict criteria for obstructive sleep apnea  Hence he purchased a CPAP out-of-pocket and has continued to use it   Patient inquired to me about mandibular devices or inspire are UPPP.    I informed patient that I would like to validate truly that he has sleep apnea at this point  If he has hypersomnolence disorder then he needs to be treated as a potential hypersomnia or narcolepsy     We also discussed easy thing that we can implement including sleep extension and sleep hygiene.         Weight loss also would be beneficial.              Review of Systems   Respiratory:  Positive for snoring and somnolence. Negative for apnea.   "  Psychiatric/Behavioral:  Negative for sleep disturbance.    All other systems reviewed and are negative.      Outpatient Encounter Medications as of 3/11/2024   Medication Sig Dispense Refill    cetirizine (ZYRTEC) 10 MG tablet Take 10 mg by mouth once daily.      olmesartan (BENICAR) 20 MG tablet Take 1 tablet (20 mg total) by mouth once daily. 90 tablet 3     No facility-administered encounter medications on file as of 3/11/2024.       The following portions of the patient's history were reviewed and updated as appropriate: He  has a past medical history of Sleep apnea and Unspecified essential hypertension.  He does not have any pertinent problems on file.  He  has a past surgical history that includes TONSILLECTOMY, ADENOIDECTOMY; Reconstruction of nose; and Fracture surgery (2010).  His family history includes Breast cancer in his mother; Cancer in his mother; Diabetes in his brother; Heart disease in his father; Hypertension in his father; Obesity in his brother; Stroke in his father.  He  reports that he has never smoked. He has never used smokeless tobacco. He reports that he does not currently use alcohol. He reports that he does not use drugs.  He has a current medication list which includes the following prescription(s): cetirizine and olmesartan.  Current Outpatient Medications on File Prior to Visit   Medication Sig Dispense Refill    cetirizine (ZYRTEC) 10 MG tablet Take 10 mg by mouth once daily.      olmesartan (BENICAR) 20 MG tablet Take 1 tablet (20 mg total) by mouth once daily. 90 tablet 3     No current facility-administered medications on file prior to visit.     He is allergic to codeine and pcn [penicillins]..      BP Readings from Last 3 Encounters:   03/11/24 (!) 130/90   03/04/24 118/82   03/01/23 120/82        Neck circumference 18"   [?BHAVIK risk if >43cm (17in) male or >41cm (15.5 in) female]         MMRC Dyspnea Scale (4 is worst)     [x] MMRC 0: Dyspneic on strenuous excercise (0 " "points)    [] MMRC 1: Dyspneic on walking a slight hill (0 points)    [] MMRC 2: Dyspneic on walking level ground; must stop occasionally due to breathlessness (1 point)    [] MMRC 3: Must stop for breathlessness after walking 100 yards or after a few minutes (2 points)    [] MMRC 4: Cannot leave house; breathless on dressing/undressing (3 points)                3/11/2024     2:13 PM   EPWORTH SLEEPINESS SCALE   Sitting and reading 3   Watching TV 3   Sitting, inactive in a public place (e.g. a theatre or a meeting) 3   As a passenger in a car for an hour without a break 3   Lying down to rest in the afternoon when circumstances permit 3   Sitting and talking to someone 1   Sitting quietly after a lunch without alcohol 2   In a car, while stopped for a few minutes in traffic 1   Total score 19                     Objective:     Vital Signs (Most Recent)  Vital Signs  Pulse: 63  Resp: 17  SpO2: (!) 94 %  BP: (!) 130/90  Height and Weight  Height: 5' 7" (170.2 cm)  Weight: 87.5 kg (192 lb 14.4 oz)  BSA (Calculated - sq m): 2.03 sq meters  BMI (Calculated): 30.2  Weight in (lb) to have BMI = 25: 159.3]  Wt Readings from Last 2 Encounters:   03/11/24 87.5 kg (192 lb 14.4 oz)   03/04/24 88.6 kg (195 lb 7 oz)       Physical Exam  Vitals and nursing note reviewed.   Constitutional:       Appearance: Normal appearance.      Comments: Neck 18"  Malampati score  4   HENT:      Head: Normocephalic and atraumatic.      Right Ear: Tympanic membrane normal.      Nose: Nose normal.   Eyes:      Pupils: Pupils are equal, round, and reactive to light.   Cardiovascular:      Rate and Rhythm: Normal rate and regular rhythm.      Pulses: Normal pulses.      Heart sounds: Normal heart sounds.   Pulmonary:      Effort: Pulmonary effort is normal.      Breath sounds: Normal breath sounds.   Abdominal:      General: Bowel sounds are normal.      Palpations: Abdomen is soft.   Musculoskeletal:         General: Normal range of motion.      " "Cervical back: Normal range of motion.   Skin:     General: Skin is warm and dry.      Capillary Refill: Capillary refill takes less than 2 seconds.   Neurological:      General: No focal deficit present.      Mental Status: He is alert and oriented to person, place, and time.   Psychiatric:         Mood and Affect: Mood normal.          Laboratory  Lab Results   Component Value Date    WBC 7.27 02/23/2024    RBC 5.09 02/23/2024    HGB 16.7 02/23/2024    HCT 48.0 02/23/2024    MCV 94 02/23/2024    MCH 32.8 (H) 02/23/2024    MCHC 34.8 02/23/2024    RDW 12.8 02/23/2024     02/23/2024    MPV 11.3 02/23/2024    GRAN 2.9 02/23/2024    GRAN 39.4 02/23/2024    LYMPH 3.4 02/23/2024    LYMPH 46.8 02/23/2024    MONO 0.6 02/23/2024    MONO 8.4 02/23/2024    EOS 0.3 02/23/2024    BASO 0.05 02/23/2024    EOSINOPHIL 4.4 02/23/2024    BASOPHIL 0.7 02/23/2024       BMP  Lab Results   Component Value Date     02/23/2024    K 4.0 02/23/2024     02/23/2024    CO2 24 02/23/2024    BUN 14 02/23/2024    CREATININE 0.8 02/23/2024    CALCIUM 9.4 02/23/2024    ANIONGAP 12 02/23/2024    ESTGFRAFRICA >60.0 12/01/2021    EGFRNONAA >60.0 12/01/2021    AST 23 03/04/2024    ALT 37 03/04/2024    PROT 7.6 03/04/2024          No results found for: "IGE"     No results found for: "ASPERGILLUS"  No results found for: "AFUMIGATUSCL"     No results found for: "ACE"     Diagnostic Results:  I have personally reviewed today the following studies:    US Abdomen Complete  Narrative: EXAM: US ABDOMEN COMPLETE    CLINICAL HISTORY: [R74.8]-Abnormal levels of other serum enzymes.    COMPARISON: None available.    TECHNIQUE: Ultrasound evaluation of the abdomen including the use of grayscale, color, and Doppler techniques.    FINDINGS:  Liver: Measures 15.2 cm. Diffusely increased hepatic echogenicity.  No focal hepatic lesion. Main portal vein flows towards the liver.    Gallbladder: Unremarkable.    Bile ducts: No biliary ductal dilation.  " CBD measures 0.2 mm in diameter.    Renal: Right kidney measures 12.7 cm and has a normal sonographic appearance.  Left kidney measures 12.4 cm, there is a nonobstructing 5 mm calculus mid left kidney.  No hydronephrosis.    Pancreas: Visualized segments are unremarkable.    Spleen: Normal in size and appearance.    Vessels: The visualized segments of the abdominal aorta are normal in caliber. The visualized IVC is unremarkable.    Miscellaneous: No ascites identified.  Impression: 1.    Hepatic steatosis.  2.    Nonobstructing 5 mm left renal calculus.    Finalized on: 3/7/2024 9:49 AM By:  Carlos Meneses MD  [facility]# [id number]      2024-03-07 09:51:[id number]    [facility]       Assessment/Plan:     Problem List Items Addressed This Visit       Hyperlipidemia    Essential hypertension     Stable on Benicar         BHAVIK on CPAP - Primary     Original sleep study reports not available to me  Patient currently has 2 machines dream station and ResMed and mini    Will need a repeat sleep study to classify his severity of sleep apnea.             Persistent nonorganic hypersomnia     Sleep hygiene  Asleep excision  Need to download CPAP to determine whether needs a pressure change or needs diagnostic study with MSLT.                      Follow up in about 3 months (around 6/11/2024), or get copy original PSG, weight loss.    This note was prepared using voice recognition system and is likely to have sound alike errors that may have been overlooked even after proof reading.  Please call me with any questions    Discussed diagnosis, its evaluation, treatment and usual course. All questions answered.    Thank you for the courtesy of participating in the care of this patient    Amadou Smiley MD      Personal Diagnostic Review  []  CXR    []  ECHO    []  ONSAT    []  6MWD    []  LABS    []  CHEST CT    []  PET CT    []  Biopsy results

## 2024-03-11 NOTE — ASSESSMENT & PLAN NOTE
Original sleep study reports not available to me  Patient currently has 2 machines dream station and ResMed and mini    Will need a repeat sleep study to classify his severity of sleep apnea.

## 2024-03-11 NOTE — ASSESSMENT & PLAN NOTE
Sleep hygiene  Asleep excision  Need to download CPAP to determine whether needs a pressure change or needs diagnostic study with MSLT.

## 2024-07-08 ENCOUNTER — OFFICE VISIT (OUTPATIENT)
Dept: PULMONOLOGY | Facility: CLINIC | Age: 44
End: 2024-07-08
Payer: COMMERCIAL

## 2024-07-08 VITALS
BODY MASS INDEX: 30.1 KG/M2 | HEART RATE: 60 BPM | HEIGHT: 67 IN | DIASTOLIC BLOOD PRESSURE: 78 MMHG | WEIGHT: 191.81 LBS | SYSTOLIC BLOOD PRESSURE: 138 MMHG | RESPIRATION RATE: 21 BRPM | OXYGEN SATURATION: 96 %

## 2024-07-08 DIAGNOSIS — G47.33 OSA ON CPAP: ICD-10-CM

## 2024-07-08 DIAGNOSIS — E78.49 OTHER HYPERLIPIDEMIA: ICD-10-CM

## 2024-07-08 DIAGNOSIS — I10 ESSENTIAL HYPERTENSION: ICD-10-CM

## 2024-07-08 DIAGNOSIS — F51.11: Primary | ICD-10-CM

## 2024-07-08 PROCEDURE — 99999 PR PBB SHADOW E&M-EST. PATIENT-LVL IV: CPT | Mod: PBBFAC,,, | Performed by: INTERNAL MEDICINE

## 2024-07-08 PROCEDURE — 99214 OFFICE O/P EST MOD 30 MIN: CPT | Mod: S$GLB,,, | Performed by: INTERNAL MEDICINE

## 2024-07-08 RX ORDER — NAPROXEN SODIUM 220 MG
220 TABLET ORAL
COMMUNITY

## 2024-07-08 NOTE — ASSESSMENT & PLAN NOTE
We will attempt to obtain old sleep study   Realistically this may not be a obtain about due to likely this information is archived  We will order PSG with MSLT.    If PSG is positive for BHAVIK than MSLT will be discontinued

## 2024-07-08 NOTE — PROGRESS NOTES
Pulmonary Outpatient  Visit     Subjective:       Patient ID: Iban Amos is a 44 y.o. male.    Social History     Tobacco Use   Smoking Status Never   Smokeless Tobacco Never            Chief Complaint: Hypersomnolence disorder        Iban Amos is 44 y.o.  Referral made for BHAVIK  Referred by Dr. Lopez   Last office visit was in 2018   He started canals were reviewed   Patient has 2 CPAP machines   Current machine auto PAP 7.5-12 cm water pressure   Uses a nasal mask   We are unable to obtain data from his CAD   Weight has changed from 184-192 lb.    On the weekdays bedtime is 10:00 p.m. wake up time is 6:00 a.m..  He functions pretty well during the day.    Is mostly concerned that in the evenings he will fall asleep at the drop of a hat.    On the weekends he goes to bed later 11:00 p.m. to 12 midnight and wakes up at 10:00 a.m. to 11:00 a.m..    He has no trouble staying alert during chose.    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    Has no lapses in consciousness or alertness when he is at work.    Most recent labs were reviewed   Patient tells me that had several sleep studies in the past that is prior to 2012.    On 1 of the sleep studies he did not meet strict criteria for obstructive sleep apnea  Hence he purchased a CPAP out-of-pocket and has continued to use it   Patient inquired to me about mandibular devices or inspire are UPPP.    I informed patient that I would like to validate truly that he has sleep apnea at this point  If he has hypersomnolence disorder then he needs to be treated as a potential hypersomnia or narcolepsy     We also discussed easy thing that we can implement including sleep extension and sleep hygiene.         Weight loss also would be beneficial.      07/08/2024  Followup still has DTS  Bed time 09:30 pm: Wake time 6;30 am  Persistance  sleepiness  Work between 07:30 am to 6 pm  Unable to download data from his device   We do not have a copy of his original sleep study   All download data from 2018 indicate he is on CPAP 8.5 cm water pressure   He says that when he does use his machine he does not snore   He attests using it daily   He says he does not get the benefit from his daytime alertness from the machine   This brings into question on alternative diagnosis for his daytime sleepiness  Denies cataplexy            Review of Systems   Respiratory:  Positive for snoring and somnolence. Negative for apnea.    Psychiatric/Behavioral:  Negative for sleep disturbance.    All other systems reviewed and are negative.      Outpatient Encounter Medications as of 7/8/2024   Medication Sig Dispense Refill    cetirizine (ZYRTEC) 10 MG tablet Take 10 mg by mouth once daily.      naproxen sodium (ANAPROX) 220 MG tablet Take 220 mg by mouth every 12 (twelve) hours.      olmesartan (BENICAR) 20 MG tablet Take 1 tablet (20 mg total) by mouth once daily. 90 tablet 3     No facility-administered encounter medications on file as of 7/8/2024.       The following portions of the patient's history were reviewed and updated as appropriate: He  has a past medical history of Sleep apnea and Unspecified essential hypertension.  He does not have any pertinent problems on file.  He  has a past surgical history that includes TONSILLECTOMY, ADENOIDECTOMY; Reconstruction of nose; and Fracture surgery (2010).  His family history includes Breast cancer in his mother; Cancer in his mother; Diabetes in his brother; Heart disease in his father; Hypertension in his father; Obesity in his brother; Stroke in his father.  He  reports that he has never smoked. He has never used smokeless tobacco. He reports that he does not currently use alcohol. He reports that he does not use drugs.  He has a current medication list which includes the following prescription(s): cetirizine, naproxen  "sodium, and olmesartan.  Current Outpatient Medications on File Prior to Visit   Medication Sig Dispense Refill    cetirizine (ZYRTEC) 10 MG tablet Take 10 mg by mouth once daily.      naproxen sodium (ANAPROX) 220 MG tablet Take 220 mg by mouth every 12 (twelve) hours.      olmesartan (BENICAR) 20 MG tablet Take 1 tablet (20 mg total) by mouth once daily. 90 tablet 3     No current facility-administered medications on file prior to visit.     He is allergic to codeine and pcn [penicillins]..      BP Readings from Last 3 Encounters:   07/08/24 138/78   03/11/24 (!) 130/90   03/04/24 118/82        Neck circumference 18"   [?BHAVIK risk if >43cm (17in) male or >41cm (15.5 in) female]         MMRC Dyspnea Scale (4 is worst)     [x] MMRC 0: Dyspneic on strenuous excercise (0 points)    [] MMRC 1: Dyspneic on walking a slight hill (0 points)    [] MMRC 2: Dyspneic on walking level ground; must stop occasionally due to breathlessness (1 point)    [] MMRC 3: Must stop for breathlessness after walking 100 yards or after a few minutes (2 points)    [] MMRC 4: Cannot leave house; breathless on dressing/undressing (3 points)           7/8/2024    11:42 AM   EPWORTH SLEEPINESS SCALE   Sitting and reading 3   Watching TV 3   Sitting, inactive in a public place (e.g. a theatre or a meeting) 3   As a passenger in a car for an hour without a break 3   Lying down to rest in the afternoon when circumstances permit 3   Sitting and talking to someone 1   Sitting quietly after a lunch without alcohol 3   In a car, while stopped for a few minutes in traffic 1   Total score 20                     Objective:     Vital Signs (Most Recent)  Vital Signs  Pulse: 60  Resp: (!) 21  SpO2: 96 %  BP: 138/78  BP Location: Left arm  Patient Position: Sitting  Height and Weight  Height: 5' 7" (170.2 cm)  Weight: 87 kg (191 lb 12.8 oz)  BSA (Calculated - sq m): 2.03 sq meters  BMI (Calculated): 30  Weight in (lb) to have BMI = 25: 159.3]  Wt Readings from " "Last 2 Encounters:   07/08/24 87 kg (191 lb 12.8 oz)   03/11/24 87.5 kg (192 lb 14.4 oz)       Physical Exam  Vitals and nursing note reviewed.   Constitutional:       Appearance: Normal appearance.      Comments: Neck 18"  Malampati score  4   HENT:      Head: Normocephalic and atraumatic.      Right Ear: Tympanic membrane normal.      Nose: Nose normal.   Eyes:      Pupils: Pupils are equal, round, and reactive to light.   Cardiovascular:      Rate and Rhythm: Normal rate and regular rhythm.      Pulses: Normal pulses.      Heart sounds: Normal heart sounds.   Pulmonary:      Effort: Pulmonary effort is normal.      Breath sounds: Normal breath sounds.   Abdominal:      General: Bowel sounds are normal.      Palpations: Abdomen is soft.   Musculoskeletal:         General: Normal range of motion.      Cervical back: Normal range of motion.   Skin:     General: Skin is warm and dry.      Capillary Refill: Capillary refill takes less than 2 seconds.   Neurological:      General: No focal deficit present.      Mental Status: He is alert and oriented to person, place, and time.   Psychiatric:         Mood and Affect: Mood normal.          Laboratory  Lab Results   Component Value Date    WBC 7.27 02/23/2024    RBC 5.09 02/23/2024    HGB 16.7 02/23/2024    HCT 48.0 02/23/2024    MCV 94 02/23/2024    MCH 32.8 (H) 02/23/2024    MCHC 34.8 02/23/2024    RDW 12.8 02/23/2024     02/23/2024    MPV 11.3 02/23/2024    GRAN 2.9 02/23/2024    GRAN 39.4 02/23/2024    LYMPH 3.4 02/23/2024    LYMPH 46.8 02/23/2024    MONO 0.6 02/23/2024    MONO 8.4 02/23/2024    EOS 0.3 02/23/2024    BASO 0.05 02/23/2024    EOSINOPHIL 4.4 02/23/2024    BASOPHIL 0.7 02/23/2024       BMP  Lab Results   Component Value Date     02/23/2024    K 4.0 02/23/2024     02/23/2024    CO2 24 02/23/2024    BUN 14 02/23/2024    CREATININE 0.8 02/23/2024    CALCIUM 9.4 02/23/2024    ANIONGAP 12 02/23/2024    ESTGFRAFRICA >60.0 12/01/2021    " "EGFRNONAA >60.0 12/01/2021    AST 23 03/04/2024    ALT 37 03/04/2024    PROT 7.6 03/04/2024          No results found for: "IGE"     No results found for: "ASPERGILLUS"  No results found for: "AFUMIGATUSCL"     No results found for: "ACE"     Diagnostic Results:  I have personally reviewed today the following studies:    US Abdomen Complete  Narrative: EXAM: US ABDOMEN COMPLETE    CLINICAL HISTORY: [R74.8]-Abnormal levels of other serum enzymes.    COMPARISON: None available.    TECHNIQUE: Ultrasound evaluation of the abdomen including the use of grayscale, color, and Doppler techniques.    FINDINGS:  Liver: Measures 15.2 cm. Diffusely increased hepatic echogenicity.  No focal hepatic lesion. Main portal vein flows towards the liver.    Gallbladder: Unremarkable.    Bile ducts: No biliary ductal dilation.  CBD measures 0.2 mm in diameter.    Renal: Right kidney measures 12.7 cm and has a normal sonographic appearance.  Left kidney measures 12.4 cm, there is a nonobstructing 5 mm calculus mid left kidney.  No hydronephrosis.    Pancreas: Visualized segments are unremarkable.    Spleen: Normal in size and appearance.    Vessels: The visualized segments of the abdominal aorta are normal in caliber. The visualized IVC is unremarkable.    Miscellaneous: No ascites identified.  Impression: 1.    Hepatic steatosis.  2.    Nonobstructing 5 mm left renal calculus.    Finalized on: 3/7/2024 9:49 AM By:  Carlos Meneses MD  [facility]# [id number]      2024-03-07 09:51:17.080    [facility]       Assessment/Plan:     Problem List Items Addressed This Visit       Hyperlipidemia    Essential hypertension    Persistent nonorganic hypersomnia - Primary    Relevant Orders    Polysomnogram (CPAP will be added if patient meets diagnostic criteria.)    Toxicology Screen, Urine    Multiple sleep latency test without CPAP    BHAVIK on CPAP     We will attempt to obtain old sleep study   Realistically this may not be a obtain about due to likely this " information is archived  We will order PSG with MSLT.    If PSG is positive for BHAVIK than MSLT will be discontinued           I have seen this patient on 2 other occasions  We have been been unable to obtain proof for the diagnoses of his obstructive sleep apnea  It sounds like from his discussion that he purchased CPAP to treat borderline are marginal obstructive sleep apnea.    If he truly has sleep apnea then we need to get up optimal settings  If he does not have sleep apnea then we need to clarify his hypersomnolence disorder and medically treat him appropriately.  If he has primary snoring then other options would be include ENT evaluation and mandibular advancement device.       Follow up in about 8 weeks (around 9/2/2024), or PSG, MSLT, weight loss,.    This note was prepared using voice recognition system and is likely to have sound alike errors that may have been overlooked even after proof reading.  Please call me with any questions    Discussed diagnosis, its evaluation, treatment and usual course. All questions answered.    Thank you for the courtesy of participating in the care of this patient    Amadou Smiley MD      Personal Diagnostic Review  []  CXR    []  ECHO    []  ONSAT    []  6MWD    []  LABS    []  CHEST CT    []  PET CT    []  Biopsy results

## 2024-07-23 ENCOUNTER — HOSPITAL ENCOUNTER (OUTPATIENT)
Dept: SLEEP MEDICINE | Facility: HOSPITAL | Age: 44
Discharge: HOME | End: 2024-07-23
Attending: INTERNAL MEDICINE
Payer: COMMERCIAL

## 2024-07-23 DIAGNOSIS — F51.11: ICD-10-CM

## 2024-07-23 DIAGNOSIS — G47.33 OSA (OBSTRUCTIVE SLEEP APNEA): Primary | ICD-10-CM

## 2024-07-23 DIAGNOSIS — G47.61 PLMD (PERIODIC LIMB MOVEMENT DISORDER): ICD-10-CM

## 2024-07-23 PROCEDURE — 95810 POLYSOM 6/> YRS 4/> PARAM: CPT

## 2024-07-23 PROCEDURE — 95805 MULTIPLE SLEEP LATENCY TEST: CPT

## 2024-07-25 ENCOUNTER — PATIENT MESSAGE (OUTPATIENT)
Dept: PULMONOLOGY | Facility: CLINIC | Age: 44
End: 2024-07-25

## 2024-07-25 NOTE — PROCEDURES
"Patient Name: Iban Amos Study Date: 7/23/2024   YOB: 1980 Study Type: PSG   Age: 44 year Patient #: [id number]   Sex: Male Billing ID: -   Height: 5' 7" Interpreting Physician: Amadou Smiley MD   Weight: 191.0 lbs Recording Tech: Milton Vivar   BMI: 30.0 Scoring Tech: Sylwia Rose   [place]: 20 Neck Circumference: 18     Indications for Polysomnography  The patient is a 44 year-old Male who is 5' 7" and weighs 191.0 lbs. His BMI equals 30.0.  A full night polysomnogram was performed to evaluate for Primary hypersomnia.    Referring provider: Amadou Smiley MD    Medical History:   Patient has 2 CPAP machines   Current machine auto PAP 7.5-12 cm water pressure   Uses a nasal mask   We are unable to obtain data from his card   Weight has changed from 184-192 lb.    On the weekdays bedtime is 10:00 p.m. wake up time is 6:00 a.m..  He functions pretty well during the day.    Is mostly concerned that in the evenings he will fall asleep at the drop of a hat.    On the weekends he goes to bed later 11:00 p.m. to 12 midnight and wakes up at 10:00 a.m. to 11:00 a.m..    He has no trouble staying alert during chore.    Patient tells me that had several sleep studies in the past that is prior to 2012.    On 1 of the sleep studies he did not meet strict criteria for obstructive sleep apnea  Hence he purchased a CPAP out-of-pocket and has continued to use it  Bed time 09:30 pm: Wake time 6;30 am  Persistance sleepiness  Work between 07:30 am to 6 pm  Denies cataplexy         Medication   Benicar   Zyretc   Anaprox     Test Description  Patient was connected to a full set of leads with a sleep technician in attendance.  Parameters used were EEG derivations (F4-A1, C4-A1, O2-A1), EOG derivations (LOC-A2, SATINDER-A2), EKG, EMG - extremity (leg) & chin, oxygen saturation, effort parameters + abdominal/thoracic (RIP), and airflow parameters - nasal pressure/thermal.  Body position was visually monitored and " noted.  Audio & video monitoring was performed during study.    Scoring is done in accordance with the American Academy of Sleep Medicine Manual for the Scoring of Sleep and Associated Events version 2.6.  Hypopneas are scored using the 4% desaturation rule.    Sleep Architecture  The total recording time of the polysomnogram was 461.0 minutes.  The total sleep time was 433.5 minutes.  The patient spent 4.7% of total sleep time in Stage N1, 63.8% in Stage N2, 10.3% in Stages N3, and 21.2% in REM.  Sleep latency was 13.8 minutes.  REM latency was 117.5 minutes.  Sleep Efficiency was 94.0%.  Wake after Sleep Onset time was 13.5 minutes.    Respiratory Events  The polysomnogram revealed a presence of 6 obstructive, 2 central, and 1 mixed apneas resulting in an Apnea index of 1.2 events per hour.  There were 45 hypopneas (?3% desat and/or Ar.) resulting in an Apnea\Hypopnea Index (AHI ?3% desat and/or Ar.) of 7.5 events per hour.  There were 45 hypopneas (?4% desat) resulting in an Apnea\Hypopnea Index (AHI ?4% desat) of 7.5 events per hour.  There were - Respiratory Effort Related Arousals resulting in a RERA index of - events per hour. The Respiratory Disturbance Index is 7.5 events per hour.     Mean oxygen saturation was 92.8%.  The lowest oxygen saturation during sleep was 88.0%.  Time spent ?88% oxygen saturation was 0.1 minutes (-).    Limb Activity  There were 132 total limb movements recorded, of this total, 131 were classified as PLMs.  PLM index was 18.1 per hour and PLM associated with Arousals index was 2.8 per hour.    Cardiac Summary  The average pulse rate was 52.4 bpm.  The minimum pulse rate was 41.0 bpm while the maximum pulse rate was 93.0 bpm.  Cardiac rhythm was normal    Conclusion:     Overall AHI was 7.5 events per hour with 54 events   Mild obstructive sleep apnea   REM RDI was 13.0 events per hour.    PLM index with arousal 2.8 per hour   Two central apnea 1 mixed  "apnea.    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  Clinical correlation for possible restless leg check ferritin     Patient Name: Iban Amos Study Date: 7/24/2024   YOB: 1980 Study Type: MSLT   Age: 44 year Patient #: [id number]   Sex: Male Billing ID: -   Height: 5' 7" Interpreting Physician: Amadou Smiley MD   Weight: 191.0 lbs Recording Tech: Candelaria Pearson   BMI: 30.0 Scoring Tech: Sylwia Hicks   [place]: 20 Neck Circumference: -         Preceding PSG  Conclusion:     Overall AHI was 7.5 events per hour with 54 events   Mild obstructive sleep apnea   REM RDI was 13.0 events per hour.    PLM index with arousal 2.8 per hour   Two central apnea 1 mixed apnea.    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  Clinical correlation for possible restless leg check ferritin       Medical History:       Medication   No Data.   MSLT Nap Summary     Nap 1 Nap 2 Nap 3 Nap 4 Nap 5 Average   Lights Off 07:02:44 AM 09:00:25 AM 11:01:46 AM - - -   Lights On 07:21:27 AM 09:19:15 AM 11:21:04 AM - - -   Time In Bed 18.7 18.8 19.3 - - 18.9   Sleep Time 16.4 15.7 15.0 - - 15.7   Sleep Latency 2.3 3.1 2.8 - - 2.7   REM Sleep Onset Latency - 12.0 - - - 12.0         Comments    NAP 1:  Lights out: 7:02am  Sleep onset: 7:06am  No REM    Nap 2:  Lights out: 9:00am  Sleep onset: 9:04am  REM observed    Nap 3:  Lights out: 11:01am  Sleep onset: 11:05am  No REM      Patient requested to leave early.       Conclusion:     3 naps completed before patient elected to " "leave  Urine toxicology was not completed  Mean sleep latency was 2.7 minutes.    REM sleep was detected on the 2nd nap.    Preceding PSG had a AHI of 7.5 events per hour with 54 events mild obstructive sleep apnea with a REM RDI of 13.0 events per hour.  REM latency on diagnostic study was 117.5 minutes.    Diagnosis: Obstructive Sleep Apnea / 327.23      Physiological Hypersomnia, Unspecified / 327.10    Recommendations:     Treatment with CPAP for obstructive sleep apnea    Residual hypersomnolence with treated sleep apnea maybe treated with wake promoting medications   This study would not meet the criteria for narcolepsy diagnoses based on non completion of the protocol reach which requires 5 nap episodes.  Urine toxicology also was not completed.      Dear Amadou Smiley MD  [id number] [facility]  [place] [id number]/Sylwia Cohen MD         The sleep study that you ordered is complete.  You have ordered sleep LAB services to perform the sleep study for Iban Amos .      Please find Sleep Study result in  the "Media tab" of Chart Review menu.        You can look  for the report in the  Media by the document type "Sleep Study Documents". Alphabetizing  "Document type" column helps to find the SLEEP STUDY report  Faster.       As the ordering provider, you are responsible for reviewing the results and implementing a treatment plan with your patient.    If you need a Sleep Medicine provider to explain the sleep study findings and arrange treatment for the patient, please refer patient for consultation to our Sleep Clinic via [facility] with Ambulatory Consult Sleep.     To do that please place an order for an  "Ambulatory Consult Sleep" -  order , it will go to our clinic work queue for our staff  to contact the patient for an appointment.      For any questions, please contact our sleep lab  staff at [phone] to talk to clinical staff          Amadou Smiley MD      "

## 2024-08-02 ENCOUNTER — HOSPITAL ENCOUNTER (OUTPATIENT)
Dept: CARDIOLOGY | Facility: HOSPITAL | Age: 44
Discharge: HOME | End: 2024-08-02
Attending: INTERNAL MEDICINE
Payer: COMMERCIAL

## 2024-08-02 ENCOUNTER — OFFICE VISIT (OUTPATIENT)
Dept: PULMONOLOGY | Facility: CLINIC | Age: 44
End: 2024-08-02
Payer: COMMERCIAL

## 2024-08-02 VITALS
OXYGEN SATURATION: 97 % | WEIGHT: 190.69 LBS | HEART RATE: 70 BPM | RESPIRATION RATE: 15 BRPM | HEIGHT: 67 IN | BODY MASS INDEX: 29.93 KG/M2 | DIASTOLIC BLOOD PRESSURE: 84 MMHG | SYSTOLIC BLOOD PRESSURE: 120 MMHG

## 2024-08-02 DIAGNOSIS — G47.33 OSA ON CPAP: Primary | ICD-10-CM

## 2024-08-02 DIAGNOSIS — I10 ESSENTIAL HYPERTENSION: ICD-10-CM

## 2024-08-02 DIAGNOSIS — G47.10 HYPERSOMNOLENCE: ICD-10-CM

## 2024-08-02 DIAGNOSIS — G47.61 PLMD (PERIODIC LIMB MOVEMENT DISORDER): ICD-10-CM

## 2024-08-02 DIAGNOSIS — E78.49 OTHER HYPERLIPIDEMIA: ICD-10-CM

## 2024-08-02 DIAGNOSIS — G47.33 OSA ON CPAP: ICD-10-CM

## 2024-08-02 PROCEDURE — 99999 PR PBB SHADOW E&M-EST. PATIENT-LVL III: CPT | Mod: PBBFAC,,, | Performed by: INTERNAL MEDICINE

## 2024-08-02 NOTE — PROGRESS NOTES
Pulmonary Outpatient  Visit     Subjective:       Patient ID: Iban Amos is a 44 y.o. male.    Social History     Tobacco Use   Smoking Status Never   Smokeless Tobacco Never            Chief Complaint: Sleep Apnea        Iban Amos is 44 y.o.  Referral made for BHAVIK  Referred by Dr. Lopez   Last office visit was in 2018   He started canals were reviewed   Patient has 2 CPAP machines   Current machine auto PAP 7.5-12 cm water pressure   Uses a nasal mask   We are unable to obtain data from his CAD   Weight has changed from 184-192 lb.    On the weekdays bedtime is 10:00 p.m. wake up time is 6:00 a.m..  He functions pretty well during the day.    Is mostly concerned that in the evenings he will fall asleep at the drop of a hat.    On the weekends he goes to bed later 11:00 p.m. to 12 midnight and wakes up at 10:00 a.m. to 11:00 a.m..    He has no trouble staying alert during chose.    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    Has no lapses in consciousness or alertness when he is at work.    Most recent labs were reviewed   Patient tells me that had several sleep studies in the past that is prior to 2012.    On 1 of the sleep studies he did not meet strict criteria for obstructive sleep apnea  Hence he purchased a CPAP out-of-pocket and has continued to use it   Patient inquired to me about mandibular devices or inspire are UPPP.    I informed patient that I would like to validate truly that he has sleep apnea at this point  If he has hypersomnolence disorder then he needs to be treated as a potential hypersomnia or narcolepsy     We also discussed easy thing that we can implement including sleep extension and sleep hygiene.         Weight loss also would be beneficial.      07/08/2024  Followup still has DTS  Bed time 09:30 pm: Wake time 6;30 am  Persistance sleepiness  Work between  07:30 am to 6 pm  Unable to download data from his device   We do not have a copy of his original sleep study   All download data from 2018 indicate he is on CPAP 8.5 cm water pressure   He says that when he does use his machine he does not snore   He attests using it daily   He says he does not get the benefit from his daytime alertness from the machine   This brings into question on alternative diagnosis for his daytime sleepiness  Denies cataplexy    08/02/2024   Follow-up visit   Sleep study which showed mild obstructive sleep apnea.    MSLT had only 3 naps with REM detected on 1 of the naps.    His CPAP devices reviewed   It is currently set on 8 cm water pressure   With the adjusted to auto PAP 8220 cm water pressure   We will closely monitor the AHI.    If residual sleepiness then we will add wake promoting medication like modafinil or Provigil or modafinil   Patient is reluctant to try wake promoting medications   Patient also has mild PLMD  We will check ferritin   No previous EKGs since 10 years   We will get baseline EKG and Holter               Review of Systems   Respiratory:  Positive for snoring and somnolence. Negative for apnea.    Psychiatric/Behavioral:  Negative for sleep disturbance.    All other systems reviewed and are negative.      Outpatient Encounter Medications as of 8/2/2024   Medication Sig Dispense Refill    cetirizine (ZYRTEC) 10 MG tablet Take 10 mg by mouth once daily.      naproxen sodium (ANAPROX) 220 MG tablet Take 220 mg by mouth every 12 (twelve) hours.      olmesartan (BENICAR) 20 MG tablet Take 1 tablet (20 mg total) by mouth once daily. 90 tablet 3     No facility-administered encounter medications on file as of 8/2/2024.       The following portions of the patient's history were reviewed and updated as appropriate: He  has a past medical history of Sleep apnea and Unspecified essential hypertension.  He does not have any pertinent problems on file.  He  has a past surgical  "history that includes TONSILLECTOMY, ADENOIDECTOMY; Reconstruction of nose; and Fracture surgery (2010).  His family history includes Breast cancer in his mother; Cancer in his mother; Diabetes in his brother; Heart disease in his father; Hypertension in his father; Obesity in his brother; Stroke in his father.  He  reports that he has never smoked. He has never used smokeless tobacco. He reports that he does not currently use alcohol. He reports that he does not use drugs.  He has a current medication list which includes the following prescription(s): cetirizine, naproxen sodium, and olmesartan.  Current Outpatient Medications on File Prior to Visit   Medication Sig Dispense Refill    cetirizine (ZYRTEC) 10 MG tablet Take 10 mg by mouth once daily.      naproxen sodium (ANAPROX) 220 MG tablet Take 220 mg by mouth every 12 (twelve) hours.      olmesartan (BENICAR) 20 MG tablet Take 1 tablet (20 mg total) by mouth once daily. 90 tablet 3     No current facility-administered medications on file prior to visit.     He is allergic to codeine and pcn [penicillins]..      BP Readings from Last 3 Encounters:   08/02/24 120/84   07/08/24 138/78   03/11/24 (!) 130/90        Neck circumference 18"   [?BHAVIK risk if >43cm (17in) male or >41cm (15.5 in) female]         MMRC Dyspnea Scale (4 is worst)     [x] MMRC 0: Dyspneic on strenuous excercise (0 points)    [] MMRC 1: Dyspneic on walking a slight hill (0 points)    [] MMRC 2: Dyspneic on walking level ground; must stop occasionally due to breathlessness (1 point)    [] MMRC 3: Must stop for breathlessness after walking 100 yards or after a few minutes (2 points)    [] MMRC 4: Cannot leave house; breathless on dressing/undressing (3 points)             8/2/2024    12:30 PM   EPWORTH SLEEPINESS SCALE   Sitting and reading 3   Watching TV 2   Sitting, inactive in a public place (e.g. a theatre or a meeting) 3   As a passenger in a car for an hour without a break 3   Lying down " "to rest in the afternoon when circumstances permit 3   Sitting and talking to someone 1   Sitting quietly after a lunch without alcohol 2   In a car, while stopped for a few minutes in traffic 1   Total score 18            Objective:     Vital Signs (Most Recent)  Vital Signs  Pulse: 70  Resp: 15  SpO2: 97 %  BP: 120/84  Height and Weight  Height: 5' 7" (170.2 cm)  Weight: 86.5 kg (190 lb 11.2 oz)  BSA (Calculated - sq m): 2.02 sq meters  BMI (Calculated): 29.9  Weight in (lb) to have BMI = 25: 159.3]  Wt Readings from Last 2 Encounters:   08/02/24 86.5 kg (190 lb 11.2 oz)   07/08/24 87 kg (191 lb 12.8 oz)       Physical Exam  Vitals and nursing note reviewed.   Constitutional:       Appearance: Normal appearance.      Comments: Neck 18"  Malampati score  4   HENT:      Head: Normocephalic and atraumatic.      Right Ear: Tympanic membrane normal.      Nose: Nose normal.   Eyes:      Pupils: Pupils are equal, round, and reactive to light.   Cardiovascular:      Rate and Rhythm: Normal rate and regular rhythm.      Pulses: Normal pulses.      Heart sounds: Normal heart sounds.   Pulmonary:      Effort: Pulmonary effort is normal.      Breath sounds: Normal breath sounds.   Abdominal:      General: Bowel sounds are normal.      Palpations: Abdomen is soft.   Musculoskeletal:         General: Normal range of motion.      Cervical back: Normal range of motion.   Skin:     General: Skin is warm and dry.      Capillary Refill: Capillary refill takes less than 2 seconds.   Neurological:      General: No focal deficit present.      Mental Status: He is alert and oriented to person, place, and time.   Psychiatric:         Mood and Affect: Mood normal.          Laboratory  Lab Results   Component Value Date    WBC 7.27 02/23/2024    RBC 5.09 02/23/2024    HGB 16.7 02/23/2024    HCT 48.0 02/23/2024    MCV 94 02/23/2024    MCH 32.8 (H) 02/23/2024    MCHC 34.8 02/23/2024    RDW 12.8 02/23/2024     02/23/2024    MPV 11.3 " "02/23/2024    GRAN 2.9 02/23/2024    GRAN 39.4 02/23/2024    LYMPH 3.4 02/23/2024    LYMPH 46.8 02/23/2024    MONO 0.6 02/23/2024    MONO 8.4 02/23/2024    EOS 0.3 02/23/2024    BASO 0.05 02/23/2024    EOSINOPHIL 4.4 02/23/2024    BASOPHIL 0.7 02/23/2024       BMP  Lab Results   Component Value Date     02/23/2024    K 4.0 02/23/2024     02/23/2024    CO2 24 02/23/2024    BUN 14 02/23/2024    CREATININE 0.8 02/23/2024    CALCIUM 9.4 02/23/2024    ANIONGAP 12 02/23/2024    ESTGFRAFRICA >60.0 12/01/2021    EGFRNONAA >60.0 12/01/2021    AST 23 03/04/2024    ALT 37 03/04/2024    PROT 7.6 03/04/2024          No results found for: "IGE"     No results found for: "ASPERGILLUS"  No results found for: "AFUMIGATUSCL"     No results found for: "ACE"     Diagnostic Results:  I have personally reviewed today the following studies:    US Abdomen Complete  Narrative: EXAM: US ABDOMEN COMPLETE    CLINICAL HISTORY: [R74.8]-Abnormal levels of other serum enzymes.    COMPARISON: None available.    TECHNIQUE: Ultrasound evaluation of the abdomen including the use of grayscale, color, and Doppler techniques.    FINDINGS:  Liver: Measures 15.2 cm. Diffusely increased hepatic echogenicity.  No focal hepatic lesion. Main portal vein flows towards the liver.    Gallbladder: Unremarkable.    Bile ducts: No biliary ductal dilation.  CBD measures 0.2 mm in diameter.    Renal: Right kidney measures 12.7 cm and has a normal sonographic appearance.  Left kidney measures 12.4 cm, there is a nonobstructing 5 mm calculus mid left kidney.  No hydronephrosis.    Pancreas: Visualized segments are unremarkable.    Spleen: Normal in size and appearance.    Vessels: The visualized segments of the abdominal aorta are normal in caliber. The visualized IVC is unremarkable.    Miscellaneous: No ascites identified.  Impression: 1.    Hepatic steatosis.  2.    Nonobstructing 5 mm left renal calculus.    Finalized on: 3/7/2024 9:49 AM By:  Carlos" "Gideon NGUYEN  [facility]# [id number]      2024-03-07 09:[id number]    [facility]     Patient Name: Iban Amos Study Date: 7/23/2024   YOB: 1980 Study Type: PSG   Age: 44 year Patient #: [id number]   Sex: Male Billing ID: -   Height: 5' 7" Interpreting Physician: Amadou Smiley MD   Weight: 191.0 lbs Recording Tech: Milton Vivar   BMI: 30.0 Scoring Tech: Sylwia Rose   [place]: 20 Neck Circumference: 18     Indications for Polysomnography  The patient is a 44 year-old Male who is 5' 7" and weighs 191.0 lbs. His BMI equals 30.0.  A full night polysomnogram was performed to evaluate for Primary hypersomnia.    Referring provider: Amadou Smiley MD    Medical History:   Patient has 2 CPAP machines   Current machine auto PAP 7.5-12 cm water pressure   Uses a nasal mask   We are unable to obtain data from his card   Weight has changed from 184-192 lb.    On the weekdays bedtime is 10:00 p.m. wake up time is 6:00 a.m..  He functions pretty well during the day.    Is mostly concerned that in the evenings he will fall asleep at the drop of a hat.    On the weekends he goes to bed later 11:00 p.m. to 12 midnight and wakes up at 10:00 a.m. to 11:00 a.m..    He has no trouble staying alert during chore.    Patient tells me that had several sleep studies in the past that is prior to 2012.    On 1 of the sleep studies he did not meet strict criteria for obstructive sleep apnea  Hence he purchased a CPAP out-of-pocket and has continued to use it  Bed time 09:30 pm: Wake time 6;30 am  Persistance sleepiness  Work between 07:30 am to 6 pm  Denies cataplexy         Medication   Benicar   Zyretc   Anaprox     Test Description  Patient was connected to a full set of leads with a sleep technician in attendance.  Parameters used were EEG derivations (F4-A1, C4-A1, O2-A1), EOG derivations (LOC-A2, SATINDER-A2), EKG, EMG - extremity (leg) & chin, oxygen saturation, effort parameters + abdominal/thoracic (RIP), and airflow parameters - " nasal pressure/thermal.  Body position was visually monitored and noted.  Audio & video monitoring was performed during study.    Scoring is done in accordance with the American Academy of Sleep Medicine Manual for the Scoring of Sleep and Associated Events version 2.6.  Hypopneas are scored using the 4% desaturation rule.    Sleep Architecture  The total recording time of the polysomnogram was 461.0 minutes.  The total sleep time was 433.5 minutes.  The patient spent 4.7% of total sleep time in Stage N1, 63.8% in Stage N2, 10.3% in Stages N3, and 21.2% in REM.  Sleep latency was 13.8 minutes.  REM latency was 117.5 minutes.  Sleep Efficiency was 94.0%.  Wake after Sleep Onset time was 13.5 minutes.    Respiratory Events  The polysomnogram revealed a presence of 6 obstructive, 2 central, and 1 mixed apneas resulting in an Apnea index of 1.2 events per hour.  There were 45 hypopneas (?3% desat and/or Ar.) resulting in an Apnea\Hypopnea Index (AHI ?3% desat and/or Ar.) of 7.5 events per hour.  There were 45 hypopneas (?4% desat) resulting in an Apnea\Hypopnea Index (AHI ?4% desat) of 7.5 events per hour.  There were - Respiratory Effort Related Arousals resulting in a RERA index of - events per hour. The Respiratory Disturbance Index is 7.5 events per hour.     Mean oxygen saturation was 92.8%.  The lowest oxygen saturation during sleep was 88.0%.  Time spent ?88% oxygen saturation was 0.1 minutes (-).    Limb Activity  There were 132 total limb movements recorded, of this total, 131 were classified as PLMs.  PLM index was 18.1 per hour and PLM associated with Arousals index was 2.8 per hour.    Cardiac Summary  The average pulse rate was 52.4 bpm.  The minimum pulse rate was 41.0 bpm while the maximum pulse rate was 93.0 bpm.  Cardiac rhythm was normal    Conclusion:     Overall AHI was 7.5 events per hour with 54 events   Mild obstructive sleep apnea   REM RDI was 13.0 events per hour.    PLM index with arousal 2.8  "per hour   Two central apnea 1 mixed apnea.    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  Clinical correlation for possible restless leg check ferritin     Patient Name: Iban Amos Study Date: 7/24/2024   YOB: 1980 Study Type: MSLT   Age: 44 year Patient #: [id number]   Sex: Male Billing ID: -   Height: 5' 7" Interpreting Physician: Amadou Smiley MD   Weight: 191.0 lbs Recording Tech: Candelaria Pearson   BMI: 30.0 Scoring Tech: Sylwia Hicks   [place]: 20 Neck Circumference: -         Preceding PSG  Conclusion:     Overall AHI was 7.5 events per hour with 54 events   Mild obstructive sleep apnea   REM RDI was 13.0 events per hour.    PLM index with arousal 2.8 per hour   Two central apnea 1 mixed apnea.    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  Clinical correlation for possible restless leg check ferritin       Medical History:       Medication   No Data.   MSLT Nap Summary     Nap 1 Nap 2 Nap 3 Nap 4 Nap 5 Average   Lights Off 07:02:44 AM 09:00:25 AM 11:01:46 AM - - -   Lights On 07:21:27 AM 09:19:15 AM 11:21:04 AM - - -   Time In Bed 18.7 18.8 19.3 - - 18.9   Sleep Time 16.4 15.7 15.0 - - 15.7   Sleep Latency 2.3 3.1 2.8 - - 2.7   REM Sleep Onset Latency - 12.0 - - - 12.0         Comments    NAP 1:  Lights out: 7:02am  Sleep onset: 7:06am  No REM    Nap 2:  Lights out: 9:00am  Sleep onset: 9:04am  REM observed    Nap 3:  Lights out: 11:01am  Sleep onset: 11:05am  No REM      Patient requested to leave early.       Conclusion:     3 " "naps completed before patient elected to leave  Urine toxicology was not completed  Mean sleep latency was 2.7 minutes.    REM sleep was detected on the 2nd nap.    Preceding PSG had a AHI of 7.5 events per hour with 54 events mild obstructive sleep apnea with a REM RDI of 13.0 events per hour.  REM latency on diagnostic study was 117.5 minutes.    Diagnosis: Obstructive Sleep Apnea / 327.23      Physiological Hypersomnia, Unspecified / 327.10    Recommendations:     Treatment with CPAP for obstructive sleep apnea    Residual hypersomnolence with treated sleep apnea maybe treated with wake promoting medications   This study would not meet the criteria for narcolepsy diagnoses based on non completion of the protocol reach which requires 5 nap episodes.  Urine toxicology also was not completed.      Dear No referring provider defined for this encounter./Sylwia Cohen MD         The sleep study that you ordered is complete.  You have ordered sleep LAB services to perform the sleep study for Iban Amos .      Please find Sleep Study result in  the "Media tab" of Chart Review menu.        You can look  for the report in the  Media by the document type "Sleep Study Documents". Alphabetizing  "Document type" column helps to find the SLEEP STUDY report  Faster.       As the ordering provider, you are responsible for reviewing the results and implementing a treatment plan with your patient.    If you need a Sleep Medicine provider to explain the sleep study findings and arrange treatment for the patient, please refer patient for consultation to our Sleep Clinic via [facility] with Ambulatory Consult Sleep.     To do that please place an order for an  "Ambulatory Consult Sleep" -  order , it will go to our clinic work queue for our staff  to contact the patient for an appointment.      For any questions, please contact our sleep lab  staff at [phone] to talk to clinical staff          Amadou Smiley, " MD      Assessment/Plan:     Problem List Items Addressed This Visit       Hyperlipidemia    Essential hypertension    BHAVIK on CPAP - Primary    Relevant Orders    EKG 12-lead    CPAP/BIPAP SUPPLIES     Other Visit Diagnoses       Hypersomnolence        Relevant Orders    EKG 12-lead    Holter monitor - 48 hour    PLMD (periodic limb movement disorder)        Relevant Orders    FERRITIN             Machine pressure changed to auto PAP 8-20 cm water pressure   New CPAP supplies ordered   Check ferritin for PLMD the mild  We will get an EKG and Holter     If residual sleepiness persists then the addition for wake promoting medications should be indicated although patient is reluctant     Follow up in about 3 months (around 11/2/2024), or ekg, holter, ferritin, cpap supplies.    This note was prepared using voice recognition system and is likely to have sound alike errors that may have been overlooked even after proof reading.  Please call me with any questions    Discussed diagnosis, its evaluation, treatment and usual course. All questions answered.    Thank you for the courtesy of participating in the care of this patient    Amadou Smiley MD      Personal Diagnostic Review  []  CXR    []  ECHO    []  ONSAT    []  6MWD    []  LABS    []  CHEST CT    []  PET CT    []  Biopsy results

## 2024-08-05 ENCOUNTER — PATIENT MESSAGE (OUTPATIENT)
Dept: CARDIOLOGY | Facility: HOSPITAL | Age: 44
End: 2024-08-05
Payer: COMMERCIAL

## 2024-08-13 ENCOUNTER — TELEPHONE (OUTPATIENT)
Dept: CARDIOLOGY | Facility: HOSPITAL | Age: 44
End: 2024-08-13
Payer: COMMERCIAL

## 2024-08-30 ENCOUNTER — HOSPITAL ENCOUNTER (OUTPATIENT)
Dept: CARDIOLOGY | Facility: HOSPITAL | Age: 44
Discharge: HOME | End: 2024-08-30
Attending: INTERNAL MEDICINE
Payer: COMMERCIAL

## 2024-08-30 ENCOUNTER — LAB VISIT (OUTPATIENT)
Dept: LAB | Facility: HOSPITAL | Age: 44
End: 2024-08-30
Attending: INTERNAL MEDICINE
Payer: COMMERCIAL

## 2024-08-30 DIAGNOSIS — G47.33 OSA ON CPAP: ICD-10-CM

## 2024-08-30 DIAGNOSIS — I10 ESSENTIAL HYPERTENSION: ICD-10-CM

## 2024-08-30 DIAGNOSIS — G47.10 HYPERSOMNOLENCE: ICD-10-CM

## 2024-08-30 DIAGNOSIS — G47.10 HYPERSOMNOLENCE: Primary | ICD-10-CM

## 2024-08-30 DIAGNOSIS — E78.49 OTHER HYPERLIPIDEMIA: ICD-10-CM

## 2024-08-30 DIAGNOSIS — F51.11: ICD-10-CM

## 2024-08-30 LAB
AMPHET+METHAMPHET UR QL: NEGATIVE
BARBITURATES UR QL SCN>200 NG/ML: NEGATIVE
BENZODIAZ UR QL SCN>200 NG/ML: NEGATIVE
BZE UR QL SCN: NEGATIVE
CANNABINOIDS UR QL SCN: NEGATIVE
CREAT UR-MCNC: 165 MG/DL (ref 23–375)
ETHANOL UR-MCNC: <10 MG/DL
METHADONE UR QL SCN>300 NG/ML: NEGATIVE
OHS QRS DURATION: 82 MS
OHS QTC CALCULATION: 400 MS
OPIATES UR QL SCN: NEGATIVE
PCP UR QL SCN>25 NG/ML: NEGATIVE
TOXICOLOGY INFORMATION: NORMAL

## 2024-08-30 PROCEDURE — 93005 ELECTROCARDIOGRAM TRACING: CPT

## 2024-08-30 PROCEDURE — 80307 DRUG TEST PRSMV CHEM ANLYZR: CPT | Performed by: INTERNAL MEDICINE

## 2024-08-30 PROCEDURE — 93010 ELECTROCARDIOGRAM REPORT: CPT | Mod: ,,, | Performed by: INTERNAL MEDICINE

## 2024-09-01 LAB
OHS QRS DURATION: 82 MS
OHS QTC CALCULATION: 400 MS

## 2024-09-03 ENCOUNTER — PATIENT OUTREACH (OUTPATIENT)
Dept: ADMINISTRATIVE | Facility: HOSPITAL | Age: 44
End: 2024-09-03
Payer: COMMERCIAL

## 2024-09-03 ENCOUNTER — PATIENT MESSAGE (OUTPATIENT)
Dept: ADMINISTRATIVE | Facility: HOSPITAL | Age: 44
End: 2024-09-03
Payer: COMMERCIAL

## 2024-09-03 ENCOUNTER — PATIENT MESSAGE (OUTPATIENT)
Dept: PRIMARY CARE CLINIC | Facility: CLINIC | Age: 44
End: 2024-09-03
Payer: COMMERCIAL

## 2024-09-03 DIAGNOSIS — Z12.11 ENCOUNTER FOR COLORECTAL CANCER SCREENING: Primary | ICD-10-CM

## 2024-09-03 DIAGNOSIS — R74.8 ELEVATED LIVER ENZYMES: Primary | ICD-10-CM

## 2024-09-03 DIAGNOSIS — E78.5 HYPERLIPIDEMIA, UNSPECIFIED HYPERLIPIDEMIA TYPE: ICD-10-CM

## 2024-09-03 DIAGNOSIS — K76.0 FATTY LIVER: ICD-10-CM

## 2024-09-03 DIAGNOSIS — Z12.12 ENCOUNTER FOR COLORECTAL CANCER SCREENING: Primary | ICD-10-CM

## 2024-09-03 NOTE — TELEPHONE ENCOUNTER
I have signed for the following orders AND/OR meds.  Please call the patient and ask the patient to schedule the testing AND/OR inform about any medications that were sent.      Orders Placed This Encounter   Procedures    Ambulatory referral/consult to Hepatology     Standing Status:   Future     Standing Expiration Date:   10/3/2025     Referral Priority:   Routine     Referral Type:   Consultation     Referral Reason:   Specialty Services Required     Requested Specialty:   Hepatology     Number of Visits Requested:   1

## 2024-09-03 NOTE — TELEPHONE ENCOUNTER
See pended referral (if applicable). See pt my chart message.//ddw    -Referral can be found under [facility] Enc tab.

## 2024-09-04 ENCOUNTER — PATIENT MESSAGE (OUTPATIENT)
Dept: HEPATOLOGY | Facility: CLINIC | Age: 44
End: 2024-09-04
Payer: COMMERCIAL

## 2024-09-06 ENCOUNTER — HOSPITAL ENCOUNTER (OUTPATIENT)
Dept: PREADMISSION TESTING | Facility: HOSPITAL | Age: 44
Discharge: HOME | End: 2024-09-06
Payer: COMMERCIAL

## 2024-09-06 ENCOUNTER — HOSPITAL ENCOUNTER (OUTPATIENT)
Dept: CARDIOLOGY | Facility: HOSPITAL | Age: 44
Discharge: HOME | End: 2024-09-06
Attending: INTERNAL MEDICINE
Payer: COMMERCIAL

## 2024-09-06 DIAGNOSIS — Z12.11 ENCOUNTER FOR COLORECTAL CANCER SCREENING: Primary | ICD-10-CM

## 2024-09-06 DIAGNOSIS — Z12.12 ENCOUNTER FOR COLORECTAL CANCER SCREENING: Primary | ICD-10-CM

## 2024-09-06 PROCEDURE — 93226 XTRNL ECG REC<48 HR SCAN A/R: CPT

## 2024-09-06 PROCEDURE — 93227 XTRNL ECG REC<48 HR R&I: CPT | Mod: ,,, | Performed by: INTERNAL MEDICINE

## 2024-09-06 RX ORDER — SODIUM, POTASSIUM,MAG SULFATES 17.5-3.13G
1 SOLUTION, RECONSTITUTED, ORAL ORAL DAILY
Qty: 1 KIT | Refills: 0 | Status: SHIPPED | OUTPATIENT
Start: 2024-09-06 | End: 2024-09-08

## 2024-09-09 ENCOUNTER — ANESTHESIA EVENT (OUTPATIENT)
Dept: ENDOSCOPY | Facility: HOSPITAL | Age: 44
End: 2024-09-09
Payer: COMMERCIAL

## 2024-09-09 NOTE — ANESTHESIA PREPROCEDURE EVALUATION
09/09/2024  Iban Amos is a 44 y.o., male.  Past Medical History:   Diagnosis Date    Sleep apnea     Unspecified essential hypertension      Past Surgical History:   Procedure Laterality Date    FRACTURE SURGERY  2010    RECONSTRUCTION OF NOSE      TONSILLECTOMY, ADENOIDECTOMY           Pre-op Assessment    I have reviewed the Patient Summary Reports.     I have reviewed the Nursing Notes. I have reviewed the NPO Status.   I have reviewed the Medications.     Review of Systems  Anesthesia Hx:  No problems with previous Anesthesia                Social:  Non-Smoker, Social Alcohol Use       Cardiovascular:     Hypertension           hyperlipidemia                             Pulmonary:        Sleep Apnea                Hepatic/GI:  Bowel Prep.                    Physical Exam  General: Well nourished, Cooperative, Alert and Oriented    Airway:  Mallampati: II   Mouth Opening: Normal  Tongue: Normal  Neck ROM: Normal ROM        Anesthesia Plan  Type of Anesthesia, risks & benefits discussed:    Anesthesia Type: Gen Natural Airway  Intra-op Monitoring Plan: Standard ASA Monitors  Induction:  IV  Informed Consent: Informed consent signed with the Patient and all parties understand the risks and agree with anesthesia plan.  All questions answered. Patient consented to blood products? No  ASA Score: 2  Day of Surgery Review of History & Physical: H&P Update referred to the surgeon/provider.    Ready For Surgery From Anesthesia Perspective.     .

## 2024-09-11 ENCOUNTER — ANESTHESIA (OUTPATIENT)
Dept: ENDOSCOPY | Facility: HOSPITAL | Age: 44
End: 2024-09-11
Payer: COMMERCIAL

## 2024-09-18 ENCOUNTER — HOSPITAL ENCOUNTER (OUTPATIENT)
Facility: HOSPITAL | Age: 44
Discharge: HOME | End: 2024-09-18
Attending: INTERNAL MEDICINE | Admitting: INTERNAL MEDICINE
Payer: COMMERCIAL

## 2024-09-18 VITALS
RESPIRATION RATE: 18 BRPM | TEMPERATURE: 97 F | OXYGEN SATURATION: 96 % | DIASTOLIC BLOOD PRESSURE: 86 MMHG | HEART RATE: 65 BPM | SYSTOLIC BLOOD PRESSURE: 131 MMHG

## 2024-09-18 DIAGNOSIS — Z12.11 COLON CANCER SCREENING: Primary | ICD-10-CM

## 2024-09-18 PROCEDURE — 25000003 PHARM REV CODE 250: Performed by: NURSE ANESTHETIST, CERTIFIED REGISTERED

## 2024-09-18 PROCEDURE — 63600175 PHARM REV CODE 636 W HCPCS: Performed by: NURSE ANESTHETIST, CERTIFIED REGISTERED

## 2024-09-18 PROCEDURE — 45380 COLONOSCOPY AND BIOPSY: CPT | Mod: 33,,, | Performed by: INTERNAL MEDICINE

## 2024-09-18 PROCEDURE — 27201012 HC FORCEPS, HOT/COLD, DISP: Performed by: INTERNAL MEDICINE

## 2024-09-18 PROCEDURE — 88305 TISSUE EXAM BY PATHOLOGIST: CPT | Performed by: STUDENT IN AN ORGANIZED HEALTH CARE EDUCATION/TRAINING PROGRAM

## 2024-09-18 PROCEDURE — 37000009 HC ANESTHESIA EA ADD 15 MINS: Performed by: INTERNAL MEDICINE

## 2024-09-18 PROCEDURE — 45380 COLONOSCOPY AND BIOPSY: CPT | Mod: 33 | Performed by: INTERNAL MEDICINE

## 2024-09-18 PROCEDURE — 63600175 PHARM REV CODE 636 W HCPCS: Performed by: INTERNAL MEDICINE

## 2024-09-18 PROCEDURE — 37000008 HC ANESTHESIA 1ST 15 MINUTES: Performed by: INTERNAL MEDICINE

## 2024-09-18 RX ORDER — SODIUM CHLORIDE, SODIUM LACTATE, POTASSIUM CHLORIDE, CALCIUM CHLORIDE 600; 310; 30; 20 MG/100ML; MG/100ML; MG/100ML; MG/100ML
INJECTION, SOLUTION INTRAVENOUS CONTINUOUS
Status: DISCONTINUED | OUTPATIENT
Start: 2024-09-18 | End: 2024-09-18 | Stop reason: HOSPADM

## 2024-09-18 RX ORDER — PROPOFOL 10 MG/ML
VIAL (ML) INTRAVENOUS
Status: DISCONTINUED | OUTPATIENT
Start: 2024-09-18 | End: 2024-09-18

## 2024-09-18 RX ORDER — LIDOCAINE HYDROCHLORIDE 20 MG/ML
INJECTION, SOLUTION EPIDURAL; INFILTRATION; INTRACAUDAL; PERINEURAL
Status: DISCONTINUED | OUTPATIENT
Start: 2024-09-18 | End: 2024-09-18

## 2024-09-18 RX ADMIN — PROPOFOL 50 MG: 10 INJECTION, EMULSION INTRAVENOUS at 11:09

## 2024-09-18 RX ADMIN — PROPOFOL 40 MG: 10 INJECTION, EMULSION INTRAVENOUS at 12:09

## 2024-09-18 RX ADMIN — PROPOFOL 30 MG: 10 INJECTION, EMULSION INTRAVENOUS at 11:09

## 2024-09-18 RX ADMIN — PROPOFOL 100 MG: 10 INJECTION, EMULSION INTRAVENOUS at 11:09

## 2024-09-18 RX ADMIN — LIDOCAINE HYDROCHLORIDE 50 MG: 20 INJECTION, SOLUTION EPIDURAL; INFILTRATION; INTRACAUDAL; PERINEURAL at 11:09

## 2024-09-18 RX ADMIN — SODIUM CHLORIDE, POTASSIUM CHLORIDE, SODIUM LACTATE AND CALCIUM CHLORIDE: 600; 310; 30; 20 INJECTION, SOLUTION INTRAVENOUS at 11:09

## 2024-09-18 RX ADMIN — PROPOFOL 20 MG: 10 INJECTION, EMULSION INTRAVENOUS at 11:09

## 2024-09-18 NOTE — PLAN OF CARE
Patient AAOx3, ambulated to room without difficulty, able to answer all questions effectively. Patient instructed to change into procedural wear. Family is at BS, phone number documented. No other needs at this time.

## 2024-09-18 NOTE — PROVATION PATIENT INSTRUCTIONS
Discharge Summary/Instructions after an Endoscopic Procedure  Patient Name: Iban Amos  Patient MRN: [id number]  Patient YOB: 1980 Wednesday, September 18, 2024  Ricco Irby MD  Dear patient,  As a result of recent federal legislation (The Federal Cures Act), you may   receive lab or pathology results from your procedure in your [facility]   account before your physician is able to contact you. Your physician or   their representative will relay the results to you with their   recommendations at their soonest availability.  Thank you,  RESTRICTIONS:  During your procedure today, you received medications for sedation.  These   medications may affect your judgment, balance and coordination.  Therefore,   for 24 hours, you have the following restrictions:   - DO NOT drive a car, operate machinery, make legal/financial decisions,   sign important papers or drink alcohol.    ACTIVITY:  Today: no heavy lifting, straining or running due to procedural   sedation/anesthesia.  The following day: return to full activity including work.  DIET:  Eat and drink normally unless instructed otherwise.     TREATMENT FOR COMMON SIDE EFFECTS:  - Mild abdominal pain, nausea, belching, bloating or excessive gas:  rest,   eat lightly and use a heating pad.  - Sore Throat: treat with throat lozenges and/or gargle with warm salt   water.  - Because air was used during the procedure, expelling large amounts of air   from your rectum or belching is normal.  - If a bowel prep was taken, you may not have a bowel movement for 1-3 days.    This is normal.  SYMPTOMS TO WATCH FOR AND REPORT TO YOUR PHYSICIAN:  1. Abdominal pain or bloating, other than gas cramps.  2. Chest pain.  3. Back pain.  4. Signs of infection such as: chills or fever occurring within 24 hours   after the procedure.  5. Rectal bleeding, which would show as bright red, maroon, or black stools.   (A tablespoon of blood from the rectum is not serious,  especially if   hemorrhoids are present.)  6. Vomiting.  7. Weakness or dizziness.  GO DIRECTLY TO THE NEAREST EMERGENCY ROOM IF YOU HAVE ANY OF THE FOLLOWING:      Difficulty breathing              Chills and/or fever over 101 F   Persistent vomiting and/or vomiting blood   Severe abdominal pain   Severe chest pain   Black, tarry stools   Bleeding- more than one tablespoon   Any other symptom or condition that you feel may need urgent attention  Your doctor recommends these additional instructions:  If any biopsies were taken, your doctors clinic will contact you in 1 to 2   weeks with any results.  - Discharge patient to home.   - Resume previous diet.   - Continue present medications.   - Await pathology results.   - Repeat colonoscopy in 7 years for surveillance.   - Return to referring physician.   - Patient has a contact number available for emergencies.  The signs and   symptoms of potential delayed complications were discussed with the   patient.  Return to normal activities tomorrow.  Written discharge   instructions were provided to the patient.  For questions, problems or results please call your physician Ricco Irby MD at Work:  [phone]  If you have any questions about the above instructions, call the GI   department at [phone] or call the endoscopy unit at [phone]   from 7am until 3 pm.  [facility] [place], EMERGENCY ROOM PHONE NUMBER:   [phone]  IF A COMPLICATION OR EMERGENCY SITUATION ARISES AND YOU ARE UNABLE TO REACH   YOUR PHYSICIAN - GO DIRECTLY TO THE EMERGENCY ROOM.  I have read or have had read to me these discharge instructions for my   procedure and have received a written copy.  I understand these   instructions and will follow-up with my physician if I have any questions.     __________________________________       _____________________________________  Nurse Signature                                          Patient/Designated    Responsible Party Signature  Ricco Irby MD  9/18/2024 12:07:59 PM  This report has been verified and signed electronically.  Dear patient,  As a result of recent federal legislation (The Federal Cures Act), you may   receive lab or pathology results from your procedure in your [facility]   account before your physician is able to contact you. Your physician or   their representative will relay the results to you with their   recommendations at their soonest availability.  Thank you,  PROVATION

## 2024-09-18 NOTE — H&P
PRE PROCEDURE H&P    Patient Name: Iban Amos  MRN: [id number]  : 1980  Date of Procedure:  2024  Referring Physician: Sylwia Cohen MD  Primary Physician: Syliwa Cohen MD  Procedure Physician: Ricco Irby MD       Planned Procedure: Colonoscopy  Diagnosis: screening for colon cancer  Chief Complaint: Same as above    HPI: Patient is an 44 y.o. male is here for the above.     Last colonoscopy: Index colonoscopy   Family history: None  Anticoagulation: None    Past Medical History:   Past Medical History:   Diagnosis Date    Sleep apnea     Unspecified essential hypertension         Past Surgical History:  Past Surgical History:   Procedure Laterality Date    FRACTURE SURGERY      RECONSTRUCTION OF NOSE      TONSILLECTOMY, ADENOIDECTOMY          Home Medications:  Prior to Admission medications    Medication Sig Start Date End Date Taking? Authorizing Provider   cetirizine (ZYRTEC) 10 MG tablet Take 10 mg by mouth once daily.   Yes Provider, Historical   naproxen sodium (ANAPROX) 220 MG tablet Take 220 mg by mouth every 12 (twelve) hours.   Yes Provider, Historical   olmesartan (BENICAR) 20 MG tablet Take 1 tablet (20 mg total) by mouth once daily. 3/4/24  Yes Sylwia Cohen MD        Allergies:  Review of patient's allergies indicates:   Allergen Reactions    Codeine Other (See Comments)     unknown    Pcn [penicillins] Other (See Comments)     unknown        Social History:   Social History     Socioeconomic History    Marital status:     Number of children: 3   Occupational History    Occupation: sales     Employer: [employer]   Tobacco Use    Smoking status: Never    Smokeless tobacco: Never   Substance and Sexual Activity    Alcohol use: Not Currently    Drug use: No    Sexual activity: Yes     Partners: Female     Social Determinants of Health     Financial Resource Strain: Low Risk  (2023)    Overall Financial Resource Strain (CARDIA)     Difficulty of Paying  Living Expenses: Not very hard   Food Insecurity: No Food Insecurity (4/4/2023)    Hunger Vital Sign     Worried About Running Out of Food in the Last Year: Never true     Ran Out of Food in the Last Year: Never true   Transportation Needs: No Transportation Needs (4/4/2023)    PRAPARE - Transportation     Lack of Transportation (Medical): No     Lack of Transportation (Non-Medical): No   Physical Activity: Insufficiently Active (4/4/2023)    Exercise Vital Sign     Days of Exercise per Week: 2 days     Minutes of Exercise per Session: 30 min   Stress: No Stress Concern Present (4/4/2023)    [employer] [place] [employer] - Occupational Stress Questionnaire     Feeling of Stress : Not at all   Housing Stability: Low Risk  (4/4/2023)    Housing Stability Vital Sign     Unable to Pay for Housing in the Last Year: No     Number of Places Lived in the Last Year: 1     Unstable Housing in the Last Year: No       Family History:  Family History   Problem Relation Name Age of Onset    Breast cancer Mother M     Cancer Mother M     Hypertension Father D     Heart disease Father D     Stroke Father D     Diabetes Brother C     Obesity Brother         ROS: No acute cardiac events, no acute respiratory complaints.     Physical Exam (all patients):    /88 (BP Location: Right arm, Patient Position: Sitting)   Pulse 60   Temp 97.7 °F (36.5 °C)   Resp 18   SpO2 97%   Lungs: Clear to auscultation bilaterally, respirations unlabored  Heart: Regular rate and rhythm, S1 and S2 normal, no obvious murmurs  Abdomen:         Soft, non-tender, bowel sounds normal, no masses, no organomegaly    Lab Results   Component Value Date    WBC 7.27 02/23/2024    MCV 94 02/23/2024    RDW 12.8 02/23/2024     02/23/2024    GLU 65 (L) 02/23/2024    HGBA1C 5.0 02/23/2024    BUN 14 02/23/2024     02/23/2024    K 4.0 02/23/2024     02/23/2024        SEDATION PLAN: per anesthesia      History reviewed, vital signs  satisfactory, cardiopulmonary status satisfactory, sedation options, risks and plans have been discussed with the patient  All their questions were answered and the patient agrees to the sedation procedures as planned and the patient is deemed an appropriate candidate for the sedation as planned.    Procedure explained to patient, informed consent obtained and placed in chart.    Ricco Irby  9/18/2024  11:18 AM

## 2024-09-18 NOTE — PLAN OF CARE
Discharge instructions reviewed with patient and visitor. Handouts given & verbalized understanding with no further questions at this time. Dr Irby spoke to pt at bedside, reviewed procedure and findings, answered questions. Made aware they are awaiting biopsy results with MD telephone number provided per AVS sheet. VSS on RA, no pain or nausea noted, tolerating po fluids, no complaints noted. Fall precautions reviewed, consents in chart, PIV removed at this time.

## 2024-09-18 NOTE — TRANSFER OF CARE
Anesthesia Transfer of Care Note    Patient: Iban Amos    Procedure(s) Performed: Procedure(s) (LRB):  COLONOSCOPY (N/A)    Patient location: PACU    Anesthesia Type: general    Transport from OR: Transported from OR on room air with adequate spontaneous ventilation    Post pain: adequate analgesia    Post assessment: no apparent anesthetic complications and tolerated procedure well    Post vital signs: stable    Level of consciousness: awake    Nausea/Vomiting: no nausea/vomiting    Complications: none    Transfer of care protocol was followed      Last vitals: Visit Vitals  /88 (BP Location: Right arm, Patient Position: Sitting)   Pulse 60   Temp 36.5 °C (97.7 °F)   Resp 18   SpO2 97%

## 2024-09-19 NOTE — ANESTHESIA POSTPROCEDURE EVALUATION
Anesthesia Post Evaluation    Patient: Iban Amos    Procedure(s) Performed: Procedure(s) (LRB):  COLONOSCOPY (N/A)    Final Anesthesia Type: general      Patient location during evaluation: PACU  Patient participation: Yes- Able to Participate  Level of consciousness: awake  Post-procedure vital signs: reviewed and stable  Pain management: adequate  Airway patency: patent    PONV status at discharge: No PONV  Anesthetic complications: no      Cardiovascular status: stable  Respiratory status: unassisted  Hydration status: euvolemic  Follow-up not needed.              Vitals Value Taken Time   /86 09/18/24 1228   Temp 36.1 °C (97 °F) 09/18/24 1208   Pulse 65 09/18/24 1228   Resp 18 09/18/24 1228   SpO2 96 % 09/18/24 1228         Event Time   Out of Recovery 12:37:53         Pain/Abiola Score: Abiola Score: 10 (9/18/2024 12:28 PM)

## 2024-09-20 LAB
FINAL PATHOLOGIC DIAGNOSIS: NORMAL
GROSS: NORMAL
Lab: NORMAL

## 2024-10-15 ENCOUNTER — LAB VISIT (OUTPATIENT)
Dept: LAB | Facility: HOSPITAL | Age: 44
End: 2024-10-15
Attending: NURSE PRACTITIONER
Payer: COMMERCIAL

## 2024-10-15 ENCOUNTER — OFFICE VISIT (OUTPATIENT)
Dept: HEPATOLOGY | Facility: CLINIC | Age: 44
End: 2024-10-15
Payer: COMMERCIAL

## 2024-10-15 VITALS
BODY MASS INDEX: 29.86 KG/M2 | HEART RATE: 67 BPM | WEIGHT: 190.25 LBS | HEIGHT: 67 IN | SYSTOLIC BLOOD PRESSURE: 151 MMHG | DIASTOLIC BLOOD PRESSURE: 91 MMHG

## 2024-10-15 DIAGNOSIS — R74.8 ELEVATED LIVER ENZYMES: ICD-10-CM

## 2024-10-15 DIAGNOSIS — K76.0 HEPATIC STEATOSIS: Primary | ICD-10-CM

## 2024-10-15 DIAGNOSIS — R93.2 ABNORMAL FINDING ON IMAGING OF LIVER: ICD-10-CM

## 2024-10-15 DIAGNOSIS — K76.0 HEPATIC STEATOSIS: ICD-10-CM

## 2024-10-15 LAB
BASOPHILS # BLD AUTO: 0.05 K/UL (ref 0–0.2)
BASOPHILS NFR BLD: 0.8 % (ref 0–1.9)
DIFFERENTIAL METHOD BLD: ABNORMAL
EOSINOPHIL # BLD AUTO: 0.2 K/UL (ref 0–0.5)
EOSINOPHIL NFR BLD: 3.8 % (ref 0–8)
ERYTHROCYTE [DISTWIDTH] IN BLOOD BY AUTOMATED COUNT: 12.2 % (ref 11.5–14.5)
HCT VFR BLD AUTO: 47.3 % (ref 40–54)
HGB BLD-MCNC: 16.1 G/DL (ref 14–18)
IMM GRANULOCYTES # BLD AUTO: 0.02 K/UL (ref 0–0.04)
IMM GRANULOCYTES NFR BLD AUTO: 0.3 % (ref 0–0.5)
INR PPP: 1 (ref 0.8–1.2)
LYMPHOCYTES # BLD AUTO: 2.5 K/UL (ref 1–4.8)
LYMPHOCYTES NFR BLD: 40.6 % (ref 18–48)
MCH RBC QN AUTO: 32.2 PG (ref 27–31)
MCHC RBC AUTO-ENTMCNC: 34 G/DL (ref 32–36)
MCV RBC AUTO: 95 FL (ref 82–98)
MONOCYTES # BLD AUTO: 0.7 K/UL (ref 0.3–1)
MONOCYTES NFR BLD: 11.1 % (ref 4–15)
NEUTROPHILS # BLD AUTO: 2.6 K/UL (ref 1.8–7.7)
NEUTROPHILS NFR BLD: 43.4 % (ref 38–73)
NRBC BLD-RTO: 0 /100 WBC
PLATELET # BLD AUTO: 184 K/UL (ref 150–450)
PMV BLD AUTO: 11 FL (ref 9.2–12.9)
PROTHROMBIN TIME: 10.7 SEC (ref 9–12.5)
RBC # BLD AUTO: 5 M/UL (ref 4.6–6.2)
WBC # BLD AUTO: 6.03 K/UL (ref 3.9–12.7)

## 2024-10-15 PROCEDURE — 86704 HEP B CORE ANTIBODY TOTAL: CPT | Performed by: NURSE PRACTITIONER

## 2024-10-15 PROCEDURE — 85610 PROTHROMBIN TIME: CPT | Performed by: NURSE PRACTITIONER

## 2024-10-15 PROCEDURE — 36415 COLL VENOUS BLD VENIPUNCTURE: CPT | Performed by: NURSE PRACTITIONER

## 2024-10-15 PROCEDURE — 83540 ASSAY OF IRON: CPT | Performed by: NURSE PRACTITIONER

## 2024-10-15 PROCEDURE — 86706 HEP B SURFACE ANTIBODY: CPT | Mod: 91 | Performed by: NURSE PRACTITIONER

## 2024-10-15 PROCEDURE — 84466 ASSAY OF TRANSFERRIN: CPT | Performed by: NURSE PRACTITIONER

## 2024-10-15 PROCEDURE — 86364 TISS TRNSGLTMNASE EA IG CLAS: CPT | Performed by: NURSE PRACTITIONER

## 2024-10-15 PROCEDURE — 86015 ACTIN ANTIBODY EACH: CPT | Performed by: NURSE PRACTITIONER

## 2024-10-15 PROCEDURE — 86790 VIRUS ANTIBODY NOS: CPT | Performed by: NURSE PRACTITIONER

## 2024-10-15 PROCEDURE — 86376 MICROSOMAL ANTIBODY EACH: CPT | Performed by: NURSE PRACTITIONER

## 2024-10-15 PROCEDURE — 99203 OFFICE O/P NEW LOW 30 MIN: CPT | Mod: S$GLB,,, | Performed by: NURSE PRACTITIONER

## 2024-10-15 PROCEDURE — 82103 ALPHA-1-ANTITRYPSIN TOTAL: CPT | Performed by: NURSE PRACTITIONER

## 2024-10-15 PROCEDURE — 85025 COMPLETE CBC W/AUTO DIFF WBC: CPT | Performed by: NURSE PRACTITIONER

## 2024-10-15 PROCEDURE — 80074 ACUTE HEPATITIS PANEL: CPT | Performed by: NURSE PRACTITIONER

## 2024-10-15 PROCEDURE — 99999 PR PBB SHADOW E&M-EST. PATIENT-LVL IV: CPT | Mod: PBBFAC,,, | Performed by: NURSE PRACTITIONER

## 2024-10-15 PROCEDURE — 82784 ASSAY IGA/IGD/IGG/IGM EACH: CPT | Performed by: NURSE PRACTITIONER

## 2024-10-15 PROCEDURE — 80053 COMPREHEN METABOLIC PANEL: CPT | Performed by: NURSE PRACTITIONER

## 2024-10-15 PROCEDURE — 82390 ASSAY OF CERULOPLASMIN: CPT | Performed by: NURSE PRACTITIONER

## 2024-10-15 PROCEDURE — 86038 ANTINUCLEAR ANTIBODIES: CPT | Performed by: NURSE PRACTITIONER

## 2024-10-15 PROCEDURE — 82728 ASSAY OF FERRITIN: CPT | Performed by: NURSE PRACTITIONER

## 2024-10-15 PROCEDURE — 86381 MITOCHONDRIAL ANTIBODY EACH: CPT | Performed by: NURSE PRACTITIONER

## 2024-10-15 NOTE — PROGRESS NOTES
Hepatology Note    PATIENT: Iban Amos  MRN: [id number]  DATE: 10/15/2024    Urgency of review: non-urgent  Referring provider: Dr. Sylwia Cohen    Diagnosis:   1. Hepatic steatosis    2. Abnormal finding on imaging of liver    3. Elevated liver enzymes    4. BMI 29.0-29.9,adult        Chief complaint: fatty liver.   Chief Complaint   Patient presents with    Elevated Hepatic Enzymes    Fatty Liver       Subjective:    Initial History: Iban Amos is a 44 y.o. male who was referred to Hepatology Clinic for consultation of fatty liver.  The patient reports medical history of HLD, elevated LFTs, HTN, colon polyp, and BPH.    ALP: 87 3/4/24 15:50  PROTEIN TOTAL: 7.6 3/4/24 15:50  Albumin: 4.2 3/4/24 15:50  BILIRUBIN TOTAL: 0.5 3/4/24 15:50  Bilirubin Direct: 0.2 3/4/24 15:50  AST: 23 3/4/24 15:50  ALT: 37 3/4/24 15:50  GGT: 38 3/4/24 15:50    3/7/2024  US ASB  1.    Hepatic steatosis.  2.    Nonobstructing 5 mm left renal calculus.    08/30/24 08:30  Ferritin: 1,111 (H)    10/15/2024   The patient reported that he feels tired so he went to his PCP. Labs showed elevated LFTs. He just had a colonoscopy for cancer screening with findings of 1 polpy.    He had a new diet and had about 12 lbs weight loss in th e past 6 months.     He does not have any new complains from liver standpoint. He denied recent hematemesis, coffee ground emesis, melena, hematochezia, jaundice, confusion, LE edema, abdominal distension.     He reported that He usually drink 2-3 alcoholic drinks per day 2 days per month.     He reported no history of autoimmune disease, hypothyroidism, IBD, cancer, IV drug, viral hepatitis, hemochromatosis, nor tattoo.     The patient reported that father had no history of cancer, mother had breast cancer.    Last colonoscopy in 9/2024 with finding of 1 colon polyp. Next colonoscopy will be in 2031.    Prior Relevant History:    He  denies hepatotoxic medication.    Review of systems:  A review of  12+ systems was conducted with pertinent positive and negative findings documented in HPI with all other systems reviewed and negative.      PFSH:  Past medical, family, and social history reviewed as documented in chart with pertinent positive medical, family, and social history detailed in HPI.    Past Medical History:   Past Medical History:   Diagnosis Date    Sleep apnea     Unspecified essential hypertension        Past Surgical HIstory:   Past Surgical History:   Procedure Laterality Date    COLONOSCOPY N/A 9/18/2024    Procedure: COLONOSCOPY;  Surgeon: Ricco Irby MD;  Location: [facility];  Service: Gastroenterology;  Laterality: N/A;    FRACTURE SURGERY  2010    RECONSTRUCTION OF NOSE      TONSILLECTOMY, ADENOIDECTOMY         Family History:   Family History   Problem Relation Name Age of Onset    Breast cancer Mother M     Cancer Mother M     Hypertension Father D     Heart disease Father D     Stroke Father D     Diabetes Brother C     Obesity Brother      Lung cancer Other      Inflammatory bowel disease Neg Hx      Hemochromatosis Neg Hx      Rambo's disease Neg Hx       He has no known family history of liver disease.     Social History:  reports that he has never smoked. He has never used smokeless tobacco. He reports that he does not currently use alcohol. He reports that he does not use drugs.    He has no significant history of alcohol consumption.    He denies history of IV drug use/Tattoo.  He  denies high-risk sexual contacts, no raw seafood, no sick contacts      Allergies:  Review of patient's allergies indicates:   Allergen Reactions    Codeine Other (See Comments)     unknown    Pcn [penicillins] Other (See Comments)     unknown       Medications:  Current Outpatient Medications   Medication Sig Dispense Refill    cetirizine (ZYRTEC) 10 MG tablet Take 10 mg by mouth once daily.      naproxen sodium (ANAPROX) 220 MG tablet Take 220 mg by mouth every 12 (twelve) hours.       "olmesartan (BENICAR) 20 MG tablet Take 1 tablet (20 mg total) by mouth once daily. 90 tablet 3     No current facility-administered medications for this visit.       Review of Systems   Constitutional:  Negative for chills, fatigue, fever and unexpected weight change.   HENT:  Negative for facial swelling and nosebleeds.    Eyes:  Negative for pain and redness.   Respiratory:  Negative for cough, chest tightness and shortness of breath.    Cardiovascular:  Negative for chest pain and leg swelling.        HLD, HTN.   Gastrointestinal:  Negative for abdominal distention, abdominal pain, blood in stool, constipation, diarrhea, nausea and vomiting.        Fatty liver.   Genitourinary:  Negative for hematuria.        Renal stone. BPH   Musculoskeletal:  Negative for gait problem and joint swelling.   Skin:  Negative for color change and rash.   Allergic/Immunologic: Negative for food allergies.   Neurological:  Negative for tremors, seizures, syncope and speech difficulty.   Hematological:  Does not bruise/bleed easily.   Psychiatric/Behavioral:  Negative for behavioral problems and confusion.        Computed MELD 3.0 unavailable. One or more values for this score either were not found within the given timeframe or did not fit some other criterion.  Computed MELD-Na unavailable. One or more values for this score either were not found within the given timeframe or did not fit some other criterion.       Objective:      Vitals:   Vitals:    10/15/24 1326   BP: (!) 151/91   Pulse: 67   Weight: 86.3 kg (190 lb 4.1 oz)   Height: 5' 7" (1.702 m)       Physical Exam  HENT:      Nose: No congestion.   Eyes:      General: No scleral icterus.  Cardiovascular:      Rate and Rhythm: Normal rate and regular rhythm.   Pulmonary:      Effort: No respiratory distress.      Breath sounds: Normal breath sounds. No wheezing.   Abdominal:      General: Abdomen is flat. Bowel sounds are normal. There is no distension.      Palpations: " "Abdomen is soft. There is no mass.      Tenderness: There is no guarding.      Hernia: No hernia is present.   Musculoskeletal:         General: No swelling.      Right lower leg: No edema.      Left lower leg: No edema.   Skin:     Coloration: Skin is not jaundiced.      Findings: No bruising.   Neurological:      General: No focal deficit present.      Mental Status: He is alert and oriented to person, place, and time. Mental status is at baseline.      Comments: No asterixis.   Psychiatric:         Mood and Affect: Mood normal.         Behavior: Behavior normal.         Laboratory Data:  Admission on 09/18/2024, Discharged on 09/18/2024   Component Date Value Ref Range Status    Final Pathologic Diagnosis 09/18/2024    Final                    Value:1 - COLON, CECUM POLYP, POLYPECTOMY:    - Sessile serrated lesion.  - Negative for conventional dysplasia and malignancy.      Interp By Randy Blair MD. , Signed on 09/20/2024 at 12:39    Gross 09/18/2024    Final                    Value:Patient ID/MRN:  [id number]   Pathology label MRN:  [id number]    The specimen is received in formalin labeled &quot;cecum polypectomy&quot;. The specimen consists of 1 tan-yellow soft tissue fragment measuring 0.4 x 0.2 x 0.2 cm. The specimen is submitted entirely in cassette [email or web address] Monzon  Grossing Technologist      Disclaimer 09/18/2024 Unless the case is a 'gross only' or additional testing only, the final diagnosis for each specimen is based on a microscopic examination of appropriate tissue sections.   Final       No results found for: "INR", "PROTIME"    No results found for: "SMOOTHMUSCAB", "MITOAB"  Lab Results   Component Value Date    FERRITIN 1,111 (H) 08/30/2024     Lab Results   Component Value Date    HEPCAB Negative 09/29/2020     Lab Results   Component Value Date    TSH 1.564 02/23/2024     Lab Results   Component Value Date    AST 23 03/04/2024    ALT 37 03/04/2024    GGT 38 03/04/2024     No " "results found for: "ABORH"    No results found for: "ANASCREEN", "SMOOTHMUSCAB", "AMAIFA", "HEPAIGG", "HEPBCAB", "HEPBSAB", "[id number]"  No results found for: "CERULOPLSM"     Lab Results   Component Value Date    HGBA1C 5.0 02/23/2024     Lab Results   Component Value Date    CHOL 210 (H) 02/23/2024    CHOL 233 (H) 03/01/2023    CHOL 224 (H) 12/01/2021     Lab Results   Component Value Date    HDL 41 02/23/2024    HDL 48 03/01/2023    HDL 43 12/01/2021     Lab Results   Component Value Date    LDLCALC 126.6 02/23/2024    LDLCALC 132.2 03/01/2023    LDLCALC 124.6 12/01/2021     Lab Results   Component Value Date    TRIG 212 (H) 02/23/2024    TRIG 264 (H) 03/01/2023    TRIG 282 (H) 12/01/2021     Lab Results   Component Value Date    CHOLHDL 19.5 (L) 02/23/2024    CHOLHDL 20.6 03/01/2023    CHOLHDL 19.2 (L) 12/01/2021         I personally reviewed imaging studies and outside records..      Assessment:       1. Hepatic steatosis    2. Abnormal finding on imaging of liver    3. Elevated liver enzymes    4. BMI 29.0-29.9,adult        Plan:     Problem List Items Addressed This Visit       Elevated liver enzymes    Relevant Orders    LORI Screen w/Reflex    Anti-Liver, Kidney, Microsome Ab    Alpha-1-Antitrypsin    Antimitochondrial Antibody    Anti-Smooth Muscle Antibody    CBC Auto Differential    Ceruloplasmin    Comprehensive Metabolic Panel    Ferritin    FibroScan (Vibration Controlled Transient Elastography)    Hepatitis A antibody, IgG    Hepatitis B Core Antibody, Total    Hepatitis B Surface Ab, Qualitative    Hepatitis B Surface Antigen    Hepatitis C Antibody    IgG    Hepatitis Panel, Acute    Iron and TIBC    Protime-INR    Tissue Transglutaminase, IgA    US Abdomen Complete    Ferritin     Other Visit Diagnoses       Hepatic steatosis    -  Primary    Relevant Orders    LORI Screen w/Reflex    Anti-Liver, Kidney, Microsome Ab    Alpha-1-Antitrypsin    Antimitochondrial Antibody    Anti-Smooth Muscle " "Antibody    CBC Auto Differential    Ceruloplasmin    Comprehensive Metabolic Panel    Ferritin    FibroScan (Vibration Controlled Transient Elastography)    Hepatitis A antibody, IgG    Hepatitis B Core Antibody, Total    Hepatitis B Surface Ab, Qualitative    Hepatitis B Surface Antigen    Hepatitis C Antibody    IgG    Hepatitis Panel, Acute    Iron and TIBC    Protime-INR    Tissue Transglutaminase, IgA    US Abdomen Complete    Ferritin    Abnormal finding on imaging of liver        Relevant Orders    LORI Screen w/Reflex    Anti-Liver, Kidney, Microsome Ab    Alpha-1-Antitrypsin    Antimitochondrial Antibody    Anti-Smooth Muscle Antibody    CBC Auto Differential    Ceruloplasmin    Comprehensive Metabolic Panel    Ferritin    FibroScan (Vibration Controlled Transient Elastography)    Hepatitis A antibody, IgG    Hepatitis B Core Antibody, Total    Hepatitis B Surface Ab, Qualitative    Hepatitis B Surface Antigen    Hepatitis C Antibody    IgG    Hepatitis Panel, Acute    Iron and TIBC    Protime-INR    Tissue Transglutaminase, IgA    US Abdomen Complete    Ferritin    BMI 29.0-29.9,adult                Iban Chavez" was seen today for elevated hepatic enzymes and fatty liver.    Diagnoses and all orders for this visit:    Hepatic steatosis  -     LORI Screen w/Reflex; Future  -     Anti-Liver, Kidney, Microsome Ab; Future  -     Alpha-1-Antitrypsin; Future  -     Antimitochondrial Antibody; Future  -     Anti-Smooth Muscle Antibody; Future  -     CBC Auto Differential; Standing  -     Ceruloplasmin; Future  -     Comprehensive Metabolic Panel; Standing  -     Ferritin; Future  -     FibroScan (Vibration Controlled Transient Elastography); Future  -     Hepatitis A antibody, IgG; Future  -     Hepatitis B Core Antibody, Total; Future  -     Hepatitis B Surface Ab, Qualitative; Future  -     Hepatitis B Surface Antigen; Future  -     Hepatitis C Antibody; Future  -     IgG; Future  -     Hepatitis " Panel, Acute; Future  -     Iron and TIBC; Future  -     Protime-INR; Future  -     Tissue Transglutaminase, IgA; Future  -     US Abdomen Complete; Future  -     Ambulatory referral/consult to Hepatology  -     Ferritin; Future    Abnormal finding on imaging of liver  -     LORI Screen w/Reflex; Future  -     Anti-Liver, Kidney, Microsome Ab; Future  -     Alpha-1-Antitrypsin; Future  -     Antimitochondrial Antibody; Future  -     Anti-Smooth Muscle Antibody; Future  -     CBC Auto Differential; Standing  -     Ceruloplasmin; Future  -     Comprehensive Metabolic Panel; Standing  -     Ferritin; Future  -     FibroScan (Vibration Controlled Transient Elastography); Future  -     Hepatitis A antibody, IgG; Future  -     Hepatitis B Core Antibody, Total; Future  -     Hepatitis B Surface Ab, Qualitative; Future  -     Hepatitis B Surface Antigen; Future  -     Hepatitis C Antibody; Future  -     IgG; Future  -     Hepatitis Panel, Acute; Future  -     Iron and TIBC; Future  -     Protime-INR; Future  -     Tissue Transglutaminase, IgA; Future  -     US Abdomen Complete; Future  -     Ambulatory referral/consult to Hepatology  -     Ferritin; Future    Elevated liver enzymes  -     LORI Screen w/Reflex; Future  -     Anti-Liver, Kidney, Microsome Ab; Future  -     Alpha-1-Antitrypsin; Future  -     Antimitochondrial Antibody; Future  -     Anti-Smooth Muscle Antibody; Future  -     CBC Auto Differential; Standing  -     Ceruloplasmin; Future  -     Comprehensive Metabolic Panel; Standing  -     Ferritin; Future  -     FibroScan (Vibration Controlled Transient Elastography); Future  -     Hepatitis A antibody, IgG; Future  -     Hepatitis B Core Antibody, Total; Future  -     Hepatitis B Surface Ab, Qualitative; Future  -     Hepatitis B Surface Antigen; Future  -     Hepatitis C Antibody; Future  -     IgG; Future  -     Hepatitis Panel, Acute; Future  -     Iron and TIBC; Future  -     Protime-INR; Future  -      Tissue Transglutaminase, IgA; Future  -     US Abdomen Complete; Future  -     Ambulatory referral/consult to Hepatology  -     Ferritin; Future    BMI 29.0-29.9,adult      Recommendations  Etiological workup for viral ( Hepatitis A/B/C), autoimmune, genetic liver disease ( Rambo, A1AT etc).  CBC, CMP INR today. Then CMP, ferritin in 1 months before next visit.   Fibroscan.  US ABD was done in 3/2024    I recommended a more pragmatic approach to her medical problems which would include the following:  - life-style modifications: weight loss, daily exercise (30 mins of HIIT or cardio), low carb/low fat diet  - Educated patient on spectrum of fatty liver disease and potential for cirrhosis if MASH present  - Explored dietary habits and discussed dietary recommendations- Low calorie, low carbohydrate, high protein mediterranean diet with goal of loosing >3-5% to improve steatosis and >7-10% to improve histological changes  Recommend alcohol abstinence.    Return to clinic in 1 months.    I have sent communication to the referring physician and/or primary care provider.      Time Statement  A total 35 minutes spent includes time preparing to see patient, reviewing  diagnostic studies and records, direct face-to-face visit, completing orders, medications , reconciliation, prescription management, and care coordination    We discussed in depth the nature of the patient's disease, the management plan in details. I have provided the patient with an opportunity to ask questions and have all questions answered to his satisfaction.     Discussed with patient that it is likely that He will see results before Myself or my nurse are able to view them and report results due to the Cures Act passed 4/1/21. Results will be sent immediately to the patient who are enrolled in the patient portal. If results come through after business hours or on weekend, we will not see them until the next business day that we are in the office. If  resulted during the business day, we will likely not be able to review them until after completing all patient visits in office that day.     Filemon Villegas, ROBBIN  [facility]

## 2024-10-16 LAB
A1AT SERPL-MCNC: 111 MG/DL (ref 100–190)
ALBUMIN SERPL BCP-MCNC: 4.2 G/DL (ref 3.5–5.2)
ALP SERPL-CCNC: 85 U/L (ref 55–135)
ALT SERPL W/O P-5'-P-CCNC: 39 U/L (ref 10–44)
ANA SER QL IF: NORMAL
ANION GAP SERPL CALC-SCNC: 11 MMOL/L (ref 8–16)
AST SERPL-CCNC: 25 U/L (ref 10–40)
BILIRUB SERPL-MCNC: 0.6 MG/DL (ref 0.1–1)
BUN SERPL-MCNC: 16 MG/DL (ref 6–20)
CALCIUM SERPL-MCNC: 9.5 MG/DL (ref 8.7–10.5)
CERULOPLASMIN SERPL-MCNC: 28 MG/DL (ref 15–45)
CHLORIDE SERPL-SCNC: 103 MMOL/L (ref 95–110)
CO2 SERPL-SCNC: 27 MMOL/L (ref 23–29)
CREAT SERPL-MCNC: 0.8 MG/DL (ref 0.5–1.4)
EST. GFR  (NO RACE VARIABLE): >60 ML/MIN/1.73 M^2
FERRITIN SERPL-MCNC: 1104 NG/ML (ref 20–300)
GLUCOSE SERPL-MCNC: 61 MG/DL (ref 70–110)
HAV IGG SER QL IA: NORMAL
HAV IGM SERPL QL IA: NORMAL
HBV CORE AB SERPL QL IA: NORMAL
HBV CORE IGM SERPL QL IA: NORMAL
HBV SURFACE AB SER-ACNC: <3 MIU/ML
HBV SURFACE AB SER-ACNC: NORMAL M[IU]/ML
HBV SURFACE AG SERPL QL IA: NORMAL
HBV SURFACE AG SERPL QL IA: NORMAL
HCV AB SERPL QL IA: NORMAL
HCV AB SERPL QL IA: NORMAL
IGG SERPL-MCNC: 1054 MG/DL (ref 650–1600)
IRON SERPL-MCNC: 183 UG/DL (ref 45–160)
POTASSIUM SERPL-SCNC: 3.9 MMOL/L (ref 3.5–5.1)
PROT SERPL-MCNC: 7.4 G/DL (ref 6–8.4)
SATURATED IRON: 67 % (ref 20–50)
SODIUM SERPL-SCNC: 141 MMOL/L (ref 136–145)
TOTAL IRON BINDING CAPACITY: 275 UG/DL (ref 250–450)
TRANSFERRIN SERPL-MCNC: 186 MG/DL (ref 200–375)

## 2024-10-17 LAB
MITOCHONDRIA AB TITR SER IF: NORMAL {TITER}
SMOOTH MUSCLE AB TITR SER IF: NORMAL {TITER}

## 2024-10-18 LAB
LKM AB SER-ACNC: 1.4 UNITS
TTG IGA SER-ACNC: 0.3 U/ML

## 2024-10-25 DIAGNOSIS — R74.8 ELEVATED LIVER ENZYMES: Primary | ICD-10-CM

## 2024-11-01 ENCOUNTER — OFFICE VISIT (OUTPATIENT)
Dept: PULMONOLOGY | Facility: CLINIC | Age: 44
End: 2024-11-01
Payer: COMMERCIAL

## 2024-11-01 ENCOUNTER — OFFICE VISIT (OUTPATIENT)
Dept: URGENT CARE | Facility: CLINIC | Age: 44
End: 2024-11-01
Payer: COMMERCIAL

## 2024-11-01 ENCOUNTER — HOSPITAL ENCOUNTER (OUTPATIENT)
Dept: RADIOLOGY | Facility: CLINIC | Age: 44
Discharge: HOME | End: 2024-11-01
Attending: PHYSICIAN ASSISTANT
Payer: COMMERCIAL

## 2024-11-01 VITALS
SYSTOLIC BLOOD PRESSURE: 130 MMHG | OXYGEN SATURATION: 98 % | HEART RATE: 65 BPM | WEIGHT: 185.19 LBS | DIASTOLIC BLOOD PRESSURE: 72 MMHG | HEIGHT: 67 IN | RESPIRATION RATE: 18 BRPM | BODY MASS INDEX: 29.07 KG/M2

## 2024-11-01 VITALS
RESPIRATION RATE: 12 BRPM | DIASTOLIC BLOOD PRESSURE: 88 MMHG | TEMPERATURE: 98 F | HEART RATE: 66 BPM | OXYGEN SATURATION: 97 % | WEIGHT: 185.44 LBS | SYSTOLIC BLOOD PRESSURE: 147 MMHG | BODY MASS INDEX: 29.1 KG/M2 | HEIGHT: 67 IN

## 2024-11-01 DIAGNOSIS — S49.92XA INJURY OF LEFT ACROMIOCLAVICULAR JOINT, INITIAL ENCOUNTER: Primary | ICD-10-CM

## 2024-11-01 DIAGNOSIS — G47.33 OSA ON CPAP: Primary | ICD-10-CM

## 2024-11-01 DIAGNOSIS — M77.8 LEFT SHOULDER TENDONITIS: ICD-10-CM

## 2024-11-01 DIAGNOSIS — I10 ESSENTIAL HYPERTENSION: ICD-10-CM

## 2024-11-01 DIAGNOSIS — E78.49 OTHER HYPERLIPIDEMIA: ICD-10-CM

## 2024-11-01 PROCEDURE — 99999 PR PBB SHADOW E&M-EST. PATIENT-LVL IV: CPT | Mod: PBBFAC,,, | Performed by: INTERNAL MEDICINE

## 2024-11-01 PROCEDURE — 99214 OFFICE O/P EST MOD 30 MIN: CPT | Mod: S$GLB,,, | Performed by: INTERNAL MEDICINE

## 2024-11-01 RX ORDER — NAPROXEN 500 MG/1
500 TABLET ORAL 2 TIMES DAILY WITH MEALS
Qty: 28 TABLET | Refills: 0 | Status: SHIPPED | OUTPATIENT
Start: 2024-11-01 | End: 2024-11-15

## 2024-11-01 NOTE — PROGRESS NOTES
Pulmonary Outpatient  Visit     Subjective:       Patient ID: Iban Amos is a 44 y.o. male.    Social History     Tobacco Use   Smoking Status Never   Smokeless Tobacco Never            Chief Complaint: BHAVIK on CPAP        Iban Amos is 44 y.o.  Referral made for BHAVIK  Referred by Dr. Lopez   Last office visit was in 2018   He started canals were reviewed   Patient has 2 CPAP machines   Current machine auto PAP 7.5-12 cm water pressure   Uses a nasal mask   We are unable to obtain data from his CAD   Weight has changed from 184-192 lb.    On the weekdays bedtime is 10:00 p.m. wake up time is 6:00 a.m..  He functions pretty well during the day.    Is mostly concerned that in the evenings he will fall asleep at the drop of a hat.    On the weekends he goes to bed later 11:00 p.m. to 12 midnight and wakes up at 10:00 a.m. to 11:00 a.m..    He has no trouble staying alert during chose.    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    Has no lapses in consciousness or alertness when he is at work.    Most recent labs were reviewed   Patient tells me that had several sleep studies in the past that is prior to 2012.    On 1 of the sleep studies he did not meet strict criteria for obstructive sleep apnea  Hence he purchased a CPAP out-of-pocket and has continued to use it   Patient inquired to me about mandibular devices or inspire are UPPP.    I informed patient that I would like to validate truly that he has sleep apnea at this point  If he has hypersomnolence disorder then he needs to be treated as a potential hypersomnia or narcolepsy     We also discussed easy thing that we can implement including sleep extension and sleep hygiene.         Weight loss also would be beneficial.      07/08/2024  Followup still has DTS  Bed time 09:30 pm: Wake time 6;30 am  Persistance sleepiness  Work between  07:30 am to 6 pm  Unable to download data from his device   We do not have a copy of his original sleep study   All download data from 2018 indicate he is on CPAP 8.5 cm water pressure   He says that when he does use his machine he does not snore   He attests using it daily   He says he does not get the benefit from his daytime alertness from the machine   This brings into question on alternative diagnosis for his daytime sleepiness  Denies cataplexy    08/02/2024   Follow-up visit   Sleep study which showed mild obstructive sleep apnea.    MSLT had only 3 naps with REM detected on 1 of the naps.    His CPAP devices reviewed   It is currently set on 8 cm water pressure   With the adjusted to auto PAP 8220 cm water pressure   We will closely monitor the AHI.    If residual sleepiness then we will add wake promoting medication like modafinil or Provigil or modafinil   Patient is reluctant to try wake promoting medications   Patient also has mild PLMD  We will check ferritin   No previous EKGs since 10 years   We will get baseline EKG and Holter       11/01/2024  Followup  Recent travelling  Abn ferritin  Await liver bx  No snoring  CPAP 8 cm  AHI 4.4  Bed time 9-10pm  Wake 06:30 am              Review of Systems   Respiratory:  Negative for apnea, snoring and somnolence.    Psychiatric/Behavioral:  Negative for sleep disturbance.    All other systems reviewed and are negative.      Outpatient Encounter Medications as of 11/1/2024   Medication Sig Dispense Refill    cetirizine (ZYRTEC) 10 MG tablet Take 10 mg by mouth once daily.      hepatitis A and B vaccine, PF, (TWINRIX) 720 ROXANA unit- 20 mcg/mL Syrg suspension Inject 1 mL (720 Units total) into the muscle As instructed (The first dose now, the second dose in 1 months, then the thrid dose in 6 months.). Give one dose now, a second dose in one month, and a third dose in six months. 1 mL 2    naproxen sodium (ANAPROX) 220 MG tablet Take 220 mg by mouth every 12  (twelve) hours.      olmesartan (BENICAR) 20 MG tablet Take 1 tablet (20 mg total) by mouth once daily. 90 tablet 3     No facility-administered encounter medications on file as of 11/1/2024.       The following portions of the patient's history were reviewed and updated as appropriate: He  has a past medical history of Sleep apnea and Unspecified essential hypertension.  He does not have any pertinent problems on file.  He  has a past surgical history that includes TONSILLECTOMY, ADENOIDECTOMY; Reconstruction of nose; Fracture surgery (2010); and Colonoscopy (N/A, 9/18/2024).  His family history includes Breast cancer in his mother; Cancer in his mother; Diabetes in his brother; Heart disease in his father; Hypertension in his father; Lung cancer in an other family member; Obesity in his brother; Stroke in his father.  He  reports that he has never smoked. He has never used smokeless tobacco. He reports that he does not currently use alcohol. He reports that he does not use drugs.  He has a current medication list which includes the following prescription(s): cetirizine, hepatitis a and b vaccine (pf), naproxen sodium, olmesartan, and naproxen.  Current Outpatient Medications on File Prior to Visit   Medication Sig Dispense Refill    cetirizine (ZYRTEC) 10 MG tablet Take 10 mg by mouth once daily.      hepatitis A and B vaccine, PF, (TWINRIX) 720 ROXANA unit- 20 mcg/mL Syrg suspension Inject 1 mL (720 Units total) into the muscle As instructed (The first dose now, the second dose in 1 months, then the thrid dose in 6 months.). Give one dose now, a second dose in one month, and a third dose in six months. 1 mL 2    naproxen sodium (ANAPROX) 220 MG tablet Take 220 mg by mouth every 12 (twelve) hours.      olmesartan (BENICAR) 20 MG tablet Take 1 tablet (20 mg total) by mouth once daily. 90 tablet 3     No current facility-administered medications on file prior to visit.     He is allergic to codeine and pcn  "[penicillins]..      BP Readings from Last 3 Encounters:   11/01/24 (!) 147/88   11/01/24 130/72   10/15/24 (!) 151/91        Neck circumference 18"   [?BHAVIK risk if >43cm (17in) male or >41cm (15.5 in) female]         MMRC Dyspnea Scale (4 is worst)     [x] MMRC 0: Dyspneic on strenuous excercise (0 points)    [] MMRC 1: Dyspneic on walking a slight hill (0 points)    [] MMRC 2: Dyspneic on walking level ground; must stop occasionally due to breathlessness (1 point)    [] MMRC 3: Must stop for breathlessness after walking 100 yards or after a few minutes (2 points)    [] MMRC 4: Cannot leave house; breathless on dressing/undressing (3 points)             10/25/2024    11:28 AM   EPWORTH SLEEPINESS SCALE   Sitting and reading 3    Watching TV 2    Sitting, inactive in a public place (e.g. a theatre or a meeting) 2    As a passenger in a car for an hour without a break 3    Lying down to rest in the afternoon when circumstances permit 3    Sitting and talking to someone 1    Sitting quietly after a lunch without alcohol 2    In a car, while stopped for a few minutes in traffic 1    Total score 17        Patient-reported          Objective:     Vital Signs (Most Recent)  Vital Signs  Pulse: 65  Resp: 18  SpO2: 98 %  BP: 130/72  Patient Position: Standing  Pain Score:   3  Height and Weight  Height: 5' 7" (170.2 cm)  Weight: 84 kg (185 lb 3 oz)  BSA (Calculated - sq m): 1.99 sq meters  BMI (Calculated): 29  Weight in (lb) to have BMI = 25: 159.3]  Wt Readings from Last 2 Encounters:   11/01/24 84.1 kg (185 lb 6.5 oz)   11/01/24 84 kg (185 lb 3 oz)       Physical Exam  Vitals and nursing note reviewed.   Constitutional:       Appearance: Normal appearance.      Comments: Neck 18"  Malampati score  4   HENT:      Head: Normocephalic and atraumatic.      Right Ear: Tympanic membrane normal.      Nose: Nose normal.   Eyes:      Pupils: Pupils are equal, round, and reactive to light.   Cardiovascular:      Rate and Rhythm: " "Normal rate and regular rhythm.      Pulses: Normal pulses.      Heart sounds: Normal heart sounds.   Pulmonary:      Effort: Pulmonary effort is normal.      Breath sounds: Normal breath sounds.   Abdominal:      General: Bowel sounds are normal.      Palpations: Abdomen is soft.   Musculoskeletal:         General: Normal range of motion.      Cervical back: Normal range of motion.   Skin:     General: Skin is warm and dry.      Capillary Refill: Capillary refill takes less than 2 seconds.   Neurological:      General: No focal deficit present.      Mental Status: He is alert and oriented to person, place, and time.   Psychiatric:         Mood and Affect: Mood normal.          Laboratory  Lab Results   Component Value Date    WBC 6.03 10/15/2024    RBC 5.00 10/15/2024    HGB 16.1 10/15/2024    HCT 47.3 10/15/2024    MCV 95 10/15/2024    MCH 32.2 (H) 10/15/2024    MCHC 34.0 10/15/2024    RDW 12.2 10/15/2024     10/15/2024    MPV 11.0 10/15/2024    GRAN 2.6 10/15/2024    GRAN 43.4 10/15/2024    LYMPH 2.5 10/15/2024    LYMPH 40.6 10/15/2024    MONO 0.7 10/15/2024    MONO 11.1 10/15/2024    EOS 0.2 10/15/2024    BASO 0.05 10/15/2024    EOSINOPHIL 3.8 10/15/2024    BASOPHIL 0.8 10/15/2024       BMP  Lab Results   Component Value Date     10/15/2024    K 3.9 10/15/2024     10/15/2024    CO2 27 10/15/2024    BUN 16 10/15/2024    CREATININE 0.8 10/15/2024    CALCIUM 9.5 10/15/2024    ANIONGAP 11 10/15/2024    ESTGFRAFRICA >60.0 12/01/2021    EGFRNONAA >60.0 12/01/2021    AST 25 10/15/2024    ALT 39 10/15/2024    PROT 7.4 10/15/2024          No results found for: "IGE"     No results found for: "ASPERGILLUS"  No results found for: "AFUMIGATUSCL"     No results found for: "ACE"     Diagnostic Results:  I have personally reviewed today the following studies:    XR SHOULDER COMPLETE 2 OR MORE VIEWS LEFT  Narrative: EXAMINATION:  XR SHOULDER COMPLETE 2 OR MORE VIEWS LEFT    CLINICAL HISTORY:  Other " "enthesopathies, not elsewhere classified    TECHNIQUE:  Two or three views of the left shoulder were performed.    COMPARISON:  None    FINDINGS:  No acute osseous or soft tissue abnormality.  Impression: As above    Electronically signed by: Anthony Walden MD  Date:    11/01/2024  Time:    14:34     Patient Name: Iban Amos Study Date: 7/23/2024   YOB: 1980 Study Type: PSG   Age: 44 year Patient #: [id number]   Sex: Male Billing ID: -   Height: 5' 7" Interpreting Physician: Amadou Smiley MD   Weight: 191.0 lbs Recording Tech: Milton Aliyahjohnna   BMI: 30.0 Scoring Tech: Sylwia Rose   [place]: 20 Neck Circumference: 18     Indications for Polysomnography  The patient is a 44 year-old Male who is 5' 7" and weighs 191.0 lbs. His BMI equals 30.0.  A full night polysomnogram was performed to evaluate for Primary hypersomnia.    Referring provider: Amadou Smiley MD    Medical History:   Patient has 2 CPAP machines   Current machine auto PAP 7.5-12 cm water pressure   Uses a nasal mask   We are unable to obtain data from his card   Weight has changed from 184-192 lb.    On the weekdays bedtime is 10:00 p.m. wake up time is 6:00 a.m..  He functions pretty well during the day.    Is mostly concerned that in the evenings he will fall asleep at the drop of a hat.    On the weekends he goes to bed later 11:00 p.m. to 12 midnight and wakes up at 10:00 a.m. to 11:00 a.m..    He has no trouble staying alert during chore.    Patient tells me that had several sleep studies in the past that is prior to 2012.    On 1 of the sleep studies he did not meet strict criteria for obstructive sleep apnea  Hence he purchased a CPAP out-of-pocket and has continued to use it  Bed time 09:30 pm: Wake time 6;30 am  Persistance sleepiness  Work between 07:30 am to 6 pm  Denies cataplexy         Medication   Benicar   Zyretc   Anaprox     Test Description  Patient was connected to a full set of leads with a sleep " technician in attendance.  Parameters used were EEG derivations (F4-A1, C4-A1, O2-A1), EOG derivations (LOC-A2, SATINDER-A2), EKG, EMG - extremity (leg) & chin, oxygen saturation, effort parameters + abdominal/thoracic (RIP), and airflow parameters - nasal pressure/thermal.  Body position was visually monitored and noted.  Audio & video monitoring was performed during study.    Scoring is done in accordance with the American Academy of Sleep Medicine Manual for the Scoring of Sleep and Associated Events version 2.6.  Hypopneas are scored using the 4% desaturation rule.    Sleep Architecture  The total recording time of the polysomnogram was 461.0 minutes.  The total sleep time was 433.5 minutes.  The patient spent 4.7% of total sleep time in Stage N1, 63.8% in Stage N2, 10.3% in Stages N3, and 21.2% in REM.  Sleep latency was 13.8 minutes.  REM latency was 117.5 minutes.  Sleep Efficiency was 94.0%.  Wake after Sleep Onset time was 13.5 minutes.    Respiratory Events  The polysomnogram revealed a presence of 6 obstructive, 2 central, and 1 mixed apneas resulting in an Apnea index of 1.2 events per hour.  There were 45 hypopneas (>=3% desat and/or Ar.) resulting in an Apnea\Hypopnea Index (AHI >=3% desat and/or Ar.) of 7.5 events per hour.  There were 45 hypopneas (>=4% desat) resulting in an Apnea\Hypopnea Index (AHI >=4% desat) of 7.5 events per hour.  There were - Respiratory Effort Related Arousals resulting in a RERA index of - events per hour. The Respiratory Disturbance Index is 7.5 events per hour.     Mean oxygen saturation was 92.8%.  The lowest oxygen saturation during sleep was 88.0%.  Time spent <=88% oxygen saturation was 0.1 minutes (-).    Limb Activity  There were 132 total limb movements recorded, of this total, 131 were classified as PLMs.  PLM index was 18.1 per hour and PLM associated with Arousals index was 2.8 per hour.    Cardiac Summary  The average pulse rate was 52.4 bpm.  The minimum pulse rate  "was 41.0 bpm while the maximum pulse rate was 93.0 bpm.  Cardiac rhythm was normal    Conclusion:     Overall AHI was 7.5 events per hour with 54 events   Mild obstructive sleep apnea   REM RDI was 13.0 events per hour.    PLM index with arousal 2.8 per hour   Two central apnea 1 mixed apnea.    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  Clinical correlation for possible restless leg check ferritin     Patient Name: Iban Amos Study Date: 7/24/2024   YOB: 1980 Study Type: MSLT   Age: 44 year Patient #: [id number]   Sex: Male Billing ID: -   Height: 5' 7" Interpreting Physician: Amadou Smiley MD   Weight: 191.0 lbs Recording Tech: Candelaria Pearson   BMI: 30.0 Scoring Tech: Sylwia Hicks   [place]: 20 Neck Circumference: -         Preceding PSG  Conclusion:     Overall AHI was 7.5 events per hour with 54 events   Mild obstructive sleep apnea   REM RDI was 13.0 events per hour.    PLM index with arousal 2.8 per hour   Two central apnea 1 mixed apnea.    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  Clinical correlation for possible restless leg check ferritin       Medical History:       Medication   No Data.   MSLT Nap Summary     Nap 1 Nap 2 Nap 3 Nap 4 Nap 5 Average   Lights Off 07:02:44 AM 09:00:25 AM 11:01:46 AM - - -   Lights On 07:21:27 AM 09:19:15 AM 11:21:04 AM - - -   Time In Bed 18.7 18.8 19.3 - - 18.9   Sleep Time 16.4 15.7 15.0 - - 15.7   Sleep Latency 2.3 3.1 2.8 - - 2.7   REM Sleep Onset Latency - 12.0 - - - 12.0 " "        Comments    NAP 1:  Lights out: 7:02am  Sleep onset: 7:06am  No REM    Nap 2:  Lights out: 9:00am  Sleep onset: 9:04am  REM observed    Nap 3:  Lights out: 11:01am  Sleep onset: 11:05am  No REM      Patient requested to leave early.       Conclusion:     3 naps completed before patient elected to leave  Urine toxicology was not completed  Mean sleep latency was 2.7 minutes.    REM sleep was detected on the 2nd nap.    Preceding PSG had a AHI of 7.5 events per hour with 54 events mild obstructive sleep apnea with a REM RDI of 13.0 events per hour.  REM latency on diagnostic study was 117.5 minutes.    Diagnosis: Obstructive Sleep Apnea / 327.23      Physiological Hypersomnia, Unspecified / 327.10    Recommendations:     Treatment with CPAP for obstructive sleep apnea    Residual hypersomnolence with treated sleep apnea maybe treated with wake promoting medications   This study would not meet the criteria for narcolepsy diagnoses based on non completion of the protocol reach which requires 5 nap episodes.  Urine toxicology also was not completed.      Dear No referring provider defined for this encounter./Sylwia Cohen MD         The sleep study that you ordered is complete.  You have ordered sleep LAB services to perform the sleep study for Iban Amos .      Please find Sleep Study result in  the "Media tab" of Chart Review menu.        You can look  for the report in the  Media by the document type "Sleep Study Documents". Alphabetizing  "Document type" column helps to find the SLEEP STUDY report  Faster.       As the ordering provider, you are responsible for reviewing the results and implementing a treatment plan with your patient.    If you need a Sleep Medicine provider to explain the sleep study findings and arrange treatment for the patient, please refer patient for consultation to our Sleep Clinic via [facility] with Ambulatory Consult Sleep.     To do that please place an order for an  " ""Ambulatory Consult Sleep" -  order , it will go to our clinic work queue for our staff  to contact the patient for an appointment.      For any questions, please contact our sleep lab  staff at [phone] to talk to clinical staff          Amadou Smiley MD      Assessment/Plan:     Problem List Items Addressed This Visit       Hyperlipidemia    Essential hypertension    BHAVIK on CPAP - Primary         10/25/2024    11:28 AM   EPWORTH SLEEPINESS SCALE   Sitting and reading 3    Watching TV 2    Sitting, inactive in a public place (e.g. a theatre or a meeting) 2    As a passenger in a car for an hour without a break 3    Lying down to rest in the afternoon when circumstances permit 3    Sitting and talking to someone 1    Sitting quietly after a lunch without alcohol 2    In a car, while stopped for a few minutes in traffic 1    Total score 17        Patient-reported     07/03/2024 to 07/31/2024  CPAP 8 cm   AHI 4.4              Feels much better   Workup for possible iron overload may be likely cause of his fatigue   CPAP is working well  Updated download     Follow up in about 1 year (around 11/1/2025), or download.    This note was prepared using voice recognition system and is likely to have sound alike errors that may have been overlooked even after proof reading.  Please call me with any questions    Discussed diagnosis, its evaluation, treatment and usual course. All questions answered.    Thank you for the courtesy of participating in the care of this patient    Amadou Smiley MD      Personal Diagnostic Review  []  CXR    []  ECHO    []  ONSAT    []  6MWD    []  LABS    []  CHEST CT    []  PET CT    []  Biopsy results                    "

## 2024-11-01 NOTE — ASSESSMENT & PLAN NOTE
10/25/2024    11:28 AM   EPWORTH SLEEPINESS SCALE   Sitting and reading 3    Watching TV 2    Sitting, inactive in a public place (e.g. a theatre or a meeting) 2    As a passenger in a car for an hour without a break 3    Lying down to rest in the afternoon when circumstances permit 3    Sitting and talking to someone 1    Sitting quietly after a lunch without alcohol 2    In a car, while stopped for a few minutes in traffic 1    Total score 17        Patient-reported     07/03/2024 to 07/31/2024  CPAP 8 cm   AHI 4.4

## 2024-11-06 ENCOUNTER — HOSPITAL ENCOUNTER (OUTPATIENT)
Dept: RADIOLOGY | Facility: HOSPITAL | Age: 44
Discharge: HOME | End: 2024-11-06
Attending: NURSE PRACTITIONER
Payer: COMMERCIAL

## 2024-11-06 ENCOUNTER — PROCEDURE VISIT (OUTPATIENT)
Dept: HEPATOLOGY | Facility: CLINIC | Age: 44
End: 2024-11-06
Attending: NURSE PRACTITIONER
Payer: COMMERCIAL

## 2024-11-06 VITALS — BODY MASS INDEX: 29.1 KG/M2 | WEIGHT: 185.44 LBS | HEIGHT: 67 IN

## 2024-11-06 DIAGNOSIS — R93.2 ABNORMAL FINDING ON IMAGING OF LIVER: ICD-10-CM

## 2024-11-06 DIAGNOSIS — K76.0 HEPATIC STEATOSIS: ICD-10-CM

## 2024-11-06 DIAGNOSIS — R74.8 ELEVATED LIVER ENZYMES: ICD-10-CM

## 2024-11-06 PROCEDURE — 76700 US EXAM ABDOM COMPLETE: CPT | Mod: 26,,, | Performed by: RADIOLOGY

## 2024-11-06 PROCEDURE — 76700 US EXAM ABDOM COMPLETE: CPT | Mod: TC

## 2024-11-06 PROCEDURE — 91200 LIVER ELASTOGRAPHY: CPT | Mod: S$GLB,,, | Performed by: INTERNAL MEDICINE

## 2024-11-06 PROCEDURE — 91200 LIVER ELASTOGRAPHY: CPT | Mod: S$GLB,,, | Performed by: NURSE PRACTITIONER

## 2024-11-06 NOTE — PROCEDURES
FibroScan (Vibration Controlled Transient Elastography)    Date/Time: 11/6/2024 9:30 AM    Performed by: Urmila Reid RN  Authorized by: Filemon Villegas FNP    Probe:  M    Universal Protocol: Patient's identity, procedure and site were verified, confirmatory pause was performed.  Discussed procedure including risks and potential complications.  Questions answered.  Patient verbalizes understanding and wishes to proceed with VCTE.     Procedure: After providing explanations of the procedure, patient was placed in the supine position with right arm in maximum abduction to allow optimal exposure of right lateral abdomen.  Patient was briefly assessed, Testing was performed in the mid-axillary location, 50Hz Shear Wave pulses were applied and the resulting Shear Wave and Propagation Speed detected with a 3.5 MHz ultrasonic signal, using the FibroScan probe, Skin to liver capsule distance and liver parenchyma were accessed during the entire examination with the FibroScan probe, Patient was instructed to breathe normally and to abstain from sudden movements during the procedure, allowing for random measurements of liver stiffness. At least 10 Shear Waves were produced, Individual measurements of each Shear Wave were calculated.  Patient tolerated the procedure well with no complications.  Meets discharge criteria as was dismissed.  Rates pain 0 out of 10.  Patient will follow up with ordering provider to review results.    Findings  Median liver stiffness score:  7.5  CAP Reading: dB/m:  243    IQR/med %:  12  Interpretation  Fibrosis interpretation is based on medial liver stiffness - Kilopascal (kPa).    Fibrosis Stage:  F 0-1  Steatosis interpretation is based on controlled attenuation parameter - (dB/m).    Steatosis Grade:  <S1  Comments/Plan:  No steatosis and no/mild fibrosis

## 2024-11-12 DIAGNOSIS — R74.8 ELEVATED LIVER ENZYMES: ICD-10-CM

## 2024-11-12 DIAGNOSIS — R79.89 ELEVATED FERRITIN LEVEL: Primary | ICD-10-CM

## 2024-11-13 ENCOUNTER — LAB VISIT (OUTPATIENT)
Dept: LAB | Facility: HOSPITAL | Age: 44
End: 2024-11-13
Attending: NURSE PRACTITIONER
Payer: COMMERCIAL

## 2024-11-13 DIAGNOSIS — R74.8 ELEVATED LIVER ENZYMES: ICD-10-CM

## 2024-11-13 DIAGNOSIS — R79.89 ELEVATED FERRITIN LEVEL: ICD-10-CM

## 2024-11-13 LAB
ALBUMIN SERPL BCP-MCNC: 4.2 G/DL (ref 3.5–5.2)
ALP SERPL-CCNC: 92 U/L (ref 40–150)
ALT SERPL W/O P-5'-P-CCNC: 40 U/L (ref 10–44)
ANION GAP SERPL CALC-SCNC: 12 MMOL/L (ref 8–16)
AST SERPL-CCNC: 24 U/L (ref 10–40)
BILIRUB SERPL-MCNC: 0.4 MG/DL (ref 0.1–1)
BUN SERPL-MCNC: 20 MG/DL (ref 6–20)
CALCIUM SERPL-MCNC: 8.9 MG/DL (ref 8.7–10.5)
CHLORIDE SERPL-SCNC: 108 MMOL/L (ref 95–110)
CO2 SERPL-SCNC: 20 MMOL/L (ref 23–29)
CREAT SERPL-MCNC: 0.9 MG/DL (ref 0.5–1.4)
EST. GFR  (NO RACE VARIABLE): >60 ML/MIN/1.73 M^2
FERRITIN SERPL-MCNC: 971 NG/ML (ref 20–300)
GLUCOSE SERPL-MCNC: 86 MG/DL (ref 70–110)
IRON SERPL-MCNC: 142 UG/DL (ref 45–160)
POTASSIUM SERPL-SCNC: 4 MMOL/L (ref 3.5–5.1)
PROT SERPL-MCNC: 7.4 G/DL (ref 6–8.4)
SATURATED IRON: 49 % (ref 20–50)
SODIUM SERPL-SCNC: 140 MMOL/L (ref 136–145)
TOTAL IRON BINDING CAPACITY: 290 UG/DL (ref 250–450)
TRANSFERRIN SERPL-MCNC: 196 MG/DL (ref 200–375)

## 2024-11-13 PROCEDURE — 82728 ASSAY OF FERRITIN: CPT | Performed by: NURSE PRACTITIONER

## 2024-11-13 PROCEDURE — 80053 COMPREHEN METABOLIC PANEL: CPT | Performed by: NURSE PRACTITIONER

## 2024-11-13 PROCEDURE — 83540 ASSAY OF IRON: CPT | Performed by: NURSE PRACTITIONER

## 2024-11-13 PROCEDURE — 36415 COLL VENOUS BLD VENIPUNCTURE: CPT | Mod: PO | Performed by: NURSE PRACTITIONER

## 2024-11-15 ENCOUNTER — OFFICE VISIT (OUTPATIENT)
Dept: CARDIOLOGY | Facility: CLINIC | Age: 44
End: 2024-11-15
Payer: COMMERCIAL

## 2024-11-15 ENCOUNTER — OFFICE VISIT (OUTPATIENT)
Dept: HEPATOLOGY | Facility: CLINIC | Age: 44
End: 2024-11-15
Payer: COMMERCIAL

## 2024-11-15 VITALS
BODY MASS INDEX: 29.72 KG/M2 | HEART RATE: 60 BPM | DIASTOLIC BLOOD PRESSURE: 90 MMHG | SYSTOLIC BLOOD PRESSURE: 132 MMHG | WEIGHT: 189.38 LBS | HEIGHT: 67 IN | OXYGEN SATURATION: 98 %

## 2024-11-15 VITALS
DIASTOLIC BLOOD PRESSURE: 87 MMHG | WEIGHT: 189.63 LBS | SYSTOLIC BLOOD PRESSURE: 133 MMHG | HEIGHT: 67 IN | BODY MASS INDEX: 29.76 KG/M2 | HEART RATE: 54 BPM

## 2024-11-15 DIAGNOSIS — R53.83 FATIGUE, UNSPECIFIED TYPE: ICD-10-CM

## 2024-11-15 DIAGNOSIS — R93.2 ABNORMAL FINDING ON IMAGING OF LIVER: Primary | ICD-10-CM

## 2024-11-15 DIAGNOSIS — G47.33 OSA ON CPAP: ICD-10-CM

## 2024-11-15 DIAGNOSIS — K76.0 HEPATIC STEATOSIS: ICD-10-CM

## 2024-11-15 DIAGNOSIS — I10 ESSENTIAL HYPERTENSION: ICD-10-CM

## 2024-11-15 DIAGNOSIS — E78.49 OTHER HYPERLIPIDEMIA: ICD-10-CM

## 2024-11-15 DIAGNOSIS — Z82.49 FAMILY HISTORY OF HEART DISEASE: Primary | ICD-10-CM

## 2024-11-15 DIAGNOSIS — E83.19 IRON OVERLOAD: ICD-10-CM

## 2024-11-15 PROCEDURE — 99999 PR PBB SHADOW E&M-EST. PATIENT-LVL III: CPT | Mod: PBBFAC,,, | Performed by: STUDENT IN AN ORGANIZED HEALTH CARE EDUCATION/TRAINING PROGRAM

## 2024-11-15 PROCEDURE — 99999 PR PBB SHADOW E&M-EST. PATIENT-LVL IV: CPT | Mod: PBBFAC,,, | Performed by: NURSE PRACTITIONER

## 2024-11-15 RX ORDER — OLMESARTAN MEDOXOMIL 40 MG/1
40 TABLET ORAL DAILY
Qty: 90 TABLET | Refills: 3 | Status: SHIPPED | OUTPATIENT
Start: 2024-11-15

## 2024-11-15 NOTE — PROGRESS NOTES
Section of Cardiology                  Cardiac Clinic Note    Chief Complaint/Reason for consultation: fatigue       HPI:   Iban Amos is a 44 y.o. male with h/o BHAVIK, HTN, HLD, hemochromatosis who comes in to cardiology clinic for evaluation.       11/15/24  Has been having fatigue 6-8 m  Wears CPAP regularly- but felt it was not working (recent adjustments have not changed)  Recently found out he had hemochromatosis, will be seeing hem/onc  Exercises 3-4 x a week with cycling   Watches his diet- mediterranean diet   BP elevated     Denies SOB, chest pain, syncope, LE swelling   Denies tobacco or ETOH abuse     Family hx: dad- 4vCABG at age 63 yo, stroke   Paternal gf-  of MI         EKG 11/15/24 SB, 54 bpm, nonspecific T wave abn    ECHO  No results found for this or any previous visit.       STRESS TEST No results found for this or any previous visit.       C No results found for this or any previous visit.            ROS: All 10 systems reviewed. Please refer to the HPI for pertinent positives. All other systems negative.     Past Medical History  Past Medical History:   Diagnosis Date    Sleep apnea     Unspecified essential hypertension        Surgical History  Past Surgical History:   Procedure Laterality Date    COLONOSCOPY N/A 2024    Procedure: COLONOSCOPY;  Surgeon: Ricco Irby MD;  Location: [facility];  Service: Gastroenterology;  Laterality: N/A;    FRACTURE SURGERY      RECONSTRUCTION OF NOSE      TONSILLECTOMY, ADENOIDECTOMY            Allergies:   Review of patient's allergies indicates:   Allergen Reactions    Codeine Other (See Comments)     unknown    Pcn [penicillins] Other (See Comments)     unknown       Social History:  Social History     Socioeconomic History    Marital status:     Number of children: 3   Occupational History    Occupation: sales     Employer: [employer]   Tobacco Use    Smoking status: Never    Smokeless tobacco: Never    Substance and Sexual Activity    Alcohol use: Not Currently    Drug use: No    Sexual activity: Yes     Partners: Female     Social Drivers of Health     Financial Resource Strain: Low Risk  (4/4/2023)    Overall Financial Resource Strain (CARDIA)     Difficulty of Paying Living Expenses: Not very hard   Food Insecurity: No Food Insecurity (4/4/2023)    Hunger Vital Sign     Worried About Running Out of Food in the Last Year: Never true     Ran Out of Food in the Last Year: Never true   Transportation Needs: No Transportation Needs (4/4/2023)    PRAPARE - Transportation     Lack of Transportation (Medical): No     Lack of Transportation (Non-Medical): No   Physical Activity: Insufficiently Active (4/4/2023)    Exercise Vital Sign     Days of Exercise per Week: 2 days     Minutes of Exercise per Session: 30 min   Stress: No Stress Concern Present (4/4/2023)    [employer] [place] [employer] - Occupational Stress Questionnaire     Feeling of Stress : Not at all   Housing Stability: Low Risk  (4/4/2023)    Housing Stability Vital Sign     Unable to Pay for Housing in the Last Year: No     Number of Places Lived in the Last Year: 1     Unstable Housing in the Last Year: No       Family History:  family history includes Breast cancer in his mother; Cancer in his maternal grandfather, maternal grandmother, and mother; Diabetes in his brother; Heart disease in his father; Hypertension in his father; Lung cancer in an other family member; Obesity in his brother; Skin cancer in his father; Stroke in his father.    Home Medications:  Current Outpatient Medications on File Prior to Visit   Medication Sig Dispense Refill    cetirizine (ZYRTEC) 10 MG tablet Take 10 mg by mouth once daily.      hepatitis A and B vaccine, PF, (TWINRIX) 720 ROXANA unit- 20 mcg/mL Syrg suspension Inject 1 mL (720 Units total) into the muscle As instructed (The first dose now, the second dose in 1 months, then the thrid dose in 6 months.).  "Give one dose now, a second dose in one month, and a third dose in six months. 1 mL 2    naproxen sodium (ANAPROX) 220 MG tablet Take 220 mg by mouth every 12 (twelve) hours.      olmesartan (BENICAR) 20 MG tablet Take 1 tablet (20 mg total) by mouth once daily. 90 tablet 3    [DISCONTINUED] naproxen (NAPROSYN) 500 MG tablet Take 1 tablet (500 mg total) by mouth 2 (two) times daily with meals. for 14 days (Patient not taking: Reported on 11/15/2024) 28 tablet 0     No current facility-administered medications on file prior to visit.       Physical exam:  BP (!) 132/90 (BP Location: Left arm, Patient Position: Sitting)   Pulse 60   Ht 5' 7" (1.702 m)   Wt 85.9 kg (189 lb 6 oz)   SpO2 98%   BMI 29.66 kg/m²         General: Pt is a 44 y.o. year old male who is AAOx3, in NAD, is pleasant, well nourished, looks stated age  HEENT: PERRL, EOMI, Oral mucosa pink & moist  CVS  No abnormal cardiac pulsations noted on inspection. JVP not raised. The apical impulse is normal on palpation, and is located in the left 5th intercostal space in the mid - clavicular line. No palpable thrills or abnormal pulsations noted. RR, S1 - S2 heard, no murmurs, rubs or gallops appreciated.   PUL : CTA B/L. No wheezes/crackles heard   ABD : BS +, soft. No tenderness elicited   LE : No C/C/E. Distal Pulses palpable B/L         LABS:    Chemistry:   Lab Results   Component Value Date     11/13/2024    K 4.0 11/13/2024     11/13/2024    CO2 20 (L) 11/13/2024    BUN 20 11/13/2024    CREATININE 0.9 11/13/2024    CALCIUM 8.9 11/13/2024     Cardiac Markers: No results found for: "CKTOTAL", "CKMB", "CKMBINDEX", "TROPONINI"  Cardiac Markers (Last 3): No results found for: "CKTOTAL", "CKMB", "CKMBINDEX", "TROPONINI"  CBC:   Lab Results   Component Value Date    WBC 5.98 11/06/2024    HGB 15.7 11/06/2024    HCT 45.9 11/06/2024    MCV 95 11/06/2024     11/06/2024     Lipids:   Lab Results   Component Value Date    CHOL 210 (H) " 02/23/2024    TRIG 212 (H) 02/23/2024    HDL 41 02/23/2024     Coagulation:   Lab Results   Component Value Date    INR 1.0 10/15/2024           Assessment    1. Family history of heart disease    2. Essential hypertension    3. Other hyperlipidemia    4. BHAVIK on CPAP    5. Fatigue, unspecified type         Plan:    Fatigue/family h/o heart disease  Obtain echo  Obtain ETT    BHAVIK  Stable  Continue CPAP    HLD   as of 2/24  Not on statin    HTN  Elevated  Increased benicar 40     Low salt, low fat diet  Exercise as tolerated, at least 30 min daily     This note was prepared using voice recognition system and is likely to have sound alike errors that may have been overlooked even after proofreading.     I have reviewed all pertinent chart information.  Plans and recommendations have been formulated under my direct supervision. All questions answered and patient voiced understanding.   If symptoms persist go to the ED.    RTC 6 weeks        Ankita Alston MD  Cardiology           Allergy;

## 2024-11-15 NOTE — PROGRESS NOTES
Hepatology Note    PATIENT: Iban Amos  MRN: [id number]  DATE: 11/15/2024    Urgency of review: non-urgent  Referring provider: No ref. provider found    Diagnosis:   1. Abnormal finding on imaging of liver    2. Hepatic steatosis    3. Iron overload    4. BMI 29.0-29.9,adult          Chief complaint: fatty liver.   Chief Complaint   Patient presents with    Fatty Liver       Subjective:    Initial History: Iban Amos is a 44 y.o. male who was referred to Hepatology Clinic for consultation of fatty liver.  The patient reports medical history of HLD, elevated LFTs, HTN, colon polyp, and BPH.    ALP: 87 3/4/24 15:50  PROTEIN TOTAL: 7.6 3/4/24 15:50  Albumin: 4.2 3/4/24 15:50  BILIRUBIN TOTAL: 0.5 3/4/24 15:50  Bilirubin Direct: 0.2 3/4/24 15:50  AST: 23 3/4/24 15:50  ALT: 37 3/4/24 15:50  GGT: 38 3/4/24 15:50    3/7/2024  US ASB  1.    Hepatic steatosis.  2.    Nonobstructing 5 mm left renal calculus.    08/30/24 08:30  Ferritin: 1,111 (H)    10/15/24   The patient reported that he feels tired so he went to his PCP. Labs showed elevated LFTs. He just had a colonoscopy for cancer screening with findings of 1 polpy.    He had a new diet and had about 12 lbs weight loss in the past 6 months.     He does not have any new complains from liver standpoint. He denied recent hematemesis, coffee ground emesis, melena, hematochezia, jaundice, confusion, LE edema, abdominal distension.     He reported that He usually drink 2-3 alcoholic drinks per day 2 days per month.     He reported no history of autoimmune disease, hypothyroidism, IBD, cancer, IV drug, viral hepatitis, hemochromatosis, nor tattoo.     The patient reported that father had no history of cancer, mother had breast cancer.    Last colonoscopy in 9/2024 with finding of 1 colon polyp. Next colonoscopy will be in 2031.      11/15/24    2 copies of C282y  variant were identified.     11/15/2024    The  patient reported that he stopped alcohol in  10/2024.    He also changed his diet. He reported 12 lbs weight change.     He already read about hemochromatosis. I answered all his questions.     He reported that he still feels tired and he just booked an appointment with a cardiologist to make sure that he does not have iron overload in his heart.     I offered MRI liver to check for iron overload. The patient wants to wait until he sees the hematologist and cardiologist.     He does not have any new complains from liver standpoint. He denied recent hematemesis, coffee ground emesis, melena, hematochezia, jaundice, confusion, LE edema, abdominal distension.    Prior Relevant History:    He  denies hepatotoxic medication.    Review of systems:  A review of 12+ systems was conducted with pertinent positive and negative findings documented in HPI with all other systems reviewed and negative.      PFSH:  Past medical, family, and social history reviewed as documented in chart with pertinent positive medical, family, and social history detailed in HPI.    Past Medical History:   Past Medical History:   Diagnosis Date    Sleep apnea     Unspecified essential hypertension        Past Surgical HIstory:   Past Surgical History:   Procedure Laterality Date    COLONOSCOPY N/A 9/18/2024    Procedure: COLONOSCOPY;  Surgeon: Ricco Irby MD;  Location: [facility];  Service: Gastroenterology;  Laterality: N/A;    FRACTURE SURGERY  2010    RECONSTRUCTION OF NOSE      TONSILLECTOMY, ADENOIDECTOMY         Family History:   Family History   Problem Relation Name Age of Onset    Breast cancer Mother M     Cancer Mother M     Hypertension Father D     Heart disease Father D     Stroke Father D     Diabetes Brother C     Obesity Brother      Lung cancer Other      Inflammatory bowel disease Neg Hx      Hemochromatosis Neg Hx      Rambo's disease Neg Hx       He has no known family history of liver disease.     Social History:  reports that he has never smoked. He has never  used smokeless tobacco. He reports that he does not currently use alcohol. He reports that he does not use drugs.    He has no significant history of alcohol consumption.    He denies history of IV drug use/Tattoo.  He  denies high-risk sexual contacts, no raw seafood, no sick contacts      Allergies:  Review of patient's allergies indicates:   Allergen Reactions    Codeine Other (See Comments)     unknown    Pcn [penicillins] Other (See Comments)     unknown       Medications:  Current Outpatient Medications   Medication Sig Dispense Refill    cetirizine (ZYRTEC) 10 MG tablet Take 10 mg by mouth once daily.      naproxen sodium (ANAPROX) 220 MG tablet Take 220 mg by mouth every 12 (twelve) hours.      olmesartan (BENICAR) 20 MG tablet Take 1 tablet (20 mg total) by mouth once daily. 90 tablet 3    hepatitis A and B vaccine, PF, (TWINRIX) 720 ROXANA unit- 20 mcg/mL Syrg suspension Inject 1 mL (720 Units total) into the muscle As instructed (The first dose now, the second dose in 1 months, then the thrid dose in 6 months.). Give one dose now, a second dose in one month, and a third dose in six months. 1 mL 2    naproxen (NAPROSYN) 500 MG tablet Take 1 tablet (500 mg total) by mouth 2 (two) times daily with meals. for 14 days (Patient not taking: Reported on 11/15/2024) 28 tablet 0     No current facility-administered medications for this visit.       Review of Systems   Constitutional:  Negative for chills, fatigue, fever and unexpected weight change.   HENT:  Negative for facial swelling and nosebleeds.    Eyes:  Negative for pain and redness.   Respiratory:  Negative for cough, chest tightness and shortness of breath.    Cardiovascular:  Negative for chest pain and leg swelling.        HLD, HTN.   Gastrointestinal:  Negative for abdominal distention, abdominal pain, blood in stool, constipation, diarrhea, nausea and vomiting.        Fatty liver.   Genitourinary:  Negative for hematuria.        Renal stone. BPH  "  Musculoskeletal:  Negative for gait problem and joint swelling.   Skin:  Negative for color change and rash.   Allergic/Immunologic: Negative for food allergies.   Neurological:  Negative for tremors, seizures, syncope and speech difficulty.   Hematological:  Does not bruise/bleed easily.        2 copies of C282y  variant were identified.    Psychiatric/Behavioral:  Negative for behavioral problems and confusion.        MELD 3.0: 6 at 10/15/2024  2:21 PM  MELD-Na: 6 at 10/15/2024  2:21 PM  Calculated from:  Serum Creatinine: 0.8 mg/dL (Using min of 1 mg/dL) at 10/15/2024  2:21 PM  Serum Sodium: 141 mmol/L (Using max of 137 mmol/L) at 10/15/2024  2:21 PM  Total Bilirubin: 0.6 mg/dL (Using min of 1 mg/dL) at 10/15/2024  2:21 PM  Serum Albumin: 4.2 g/dL (Using max of 3.5 g/dL) at 10/15/2024  2:21 PM  INR(ratio): 1 at 10/15/2024  2:21 PM  Age at listing (hypothetical): 44 years  Sex: Male at 10/15/2024  2:21 PM       Objective:      Vitals:   Vitals:    11/15/24 0809   BP: 133/87   BP Location: Right arm   Patient Position: Sitting   Pulse: (!) 54   Weight: 86 kg (189 lb 9.5 oz)   Height: 5' 7" (1.702 m)         Physical Exam  HENT:      Head: Atraumatic.      Nose: No congestion.   Eyes:      General: No scleral icterus.  Cardiovascular:      Rate and Rhythm: Normal rate and regular rhythm.   Pulmonary:      Effort: No respiratory distress.      Breath sounds: Normal breath sounds. No wheezing.   Abdominal:      General: Abdomen is flat. Bowel sounds are normal. There is no distension.      Palpations: Abdomen is soft. There is no mass.      Tenderness: There is no guarding.      Hernia: No hernia is present.   Musculoskeletal:         General: No swelling.      Right lower leg: No edema.      Left lower leg: No edema.   Skin:     Coloration: Skin is not jaundiced.      Findings: No bruising.   Neurological:      General: No focal deficit present.      Mental Status: He is alert and oriented to person, place, and " time. Mental status is at baseline.      Comments: No asterixis.   Psychiatric:         Mood and Affect: Mood normal.         Behavior: Behavior normal.         Laboratory Data:  Lab Visit on 11/13/2024   Component Date Value Ref Range Status    Sodium 11/13/2024 140  136 - 145 mmol/L Final    Potassium 11/13/2024 4.0  3.5 - 5.1 mmol/L Final    Chloride 11/13/2024 108  95 - 110 mmol/L Final    CO2 11/13/2024 20 (L)  23 - 29 mmol/L Final    Glucose 11/13/2024 86  70 - 110 mg/dL Final    BUN 11/13/2024 20  6 - 20 mg/dL Final    Creatinine 11/13/2024 0.9  0.5 - 1.4 mg/dL Final    Calcium 11/13/2024 8.9  8.7 - 10.5 mg/dL Final    Total Protein 11/13/2024 7.4  6.0 - 8.4 g/dL Final    Albumin 11/13/2024 4.2  3.5 - 5.2 g/dL Final    Total Bilirubin 11/13/2024 0.4  0.1 - 1.0 mg/dL Final    Comment: For infants and newborns, interpretation of results should be based  on gestational age, weight and in agreement with clinical  observations.    Premature Infant recommended reference ranges:  Up to 24 hours.............<8.0 mg/dL  Up to 48 hours............<12.0 mg/dL  3-5 days..................<15.0 mg/dL  6-29 days.................<15.0 mg/dL      Alkaline Phosphatase 11/13/2024 92  40 - 150 U/L Final    AST 11/13/2024 24  10 - 40 U/L Final    ALT 11/13/2024 40  10 - 44 U/L Final    eGFR 11/13/2024 >60  >60 mL/min/1.73 m^2 Final    Anion Gap 11/13/2024 12  8 - 16 mmol/L Final    Ferritin 11/13/2024 971 (H)  20.0 - 300.0 ng/mL Final    Iron 11/13/2024 142  45 - 160 ug/dL Final    Transferrin 11/13/2024 196 (L)  200 - 375 mg/dL Final    TIBC 11/13/2024 290  250 - 450 ug/dL Final    Saturated Iron 11/13/2024 49  20 - 50 % Final   Lab Visit on 11/06/2024   Component Date Value Ref Range Status    WBC 11/06/2024 5.98  3.90 - 12.70 K/uL Final    RBC 11/06/2024 4.85  4.60 - 6.20 M/uL Final    Hemoglobin 11/06/2024 15.7  14.0 - 18.0 g/dL Final    Hematocrit 11/06/2024 45.9  40.0 - 54.0 % Final    MCV 11/06/2024 95  82 - 98 fL Final     MCH 11/06/2024 32.4 (H)  27.0 - 31.0 pg Final    MCHC 11/06/2024 34.2  32.0 - 36.0 g/dL Final    RDW 11/06/2024 12.3  11.5 - 14.5 % Final    Platelets 11/06/2024 188  150 - 450 K/uL Final    MPV 11/06/2024 11.3  9.2 - 12.9 fL Final    Immature Granulocytes 11/06/2024 0.3  0.0 - 0.5 % Final    Gran # (ANC) 11/06/2024 2.9  1.8 - 7.7 K/uL Final    Immature Grans (Abs) 11/06/2024 0.02  0.00 - 0.04 K/uL Final    Comment: Mild elevation in immature granulocytes is non specific and   can be seen in a variety of conditions including stress response,   acute inflammation, trauma and pregnancy. Correlation with other   laboratory and clinical findings is essential.      Lymph # 11/06/2024 2.2  1.0 - 4.8 K/uL Final    Mono # 11/06/2024 0.6  0.3 - 1.0 K/uL Final    Eos # 11/06/2024 0.3  0.0 - 0.5 K/uL Final    Baso # 11/06/2024 0.05  0.00 - 0.20 K/uL Final    nRBC 11/06/2024 0  0 /100 WBC Final    Gran % 11/06/2024 48.1  38.0 - 73.0 % Final    Lymph % 11/06/2024 36.1  18.0 - 48.0 % Final    Mono % 11/06/2024 10.0  4.0 - 15.0 % Final    Eosinophil % 11/06/2024 4.7  0.0 - 8.0 % Final    Basophil % 11/06/2024 0.8  0.0 - 1.9 % Final    Differential Method 11/06/2024 Automated   Final    Sodium 11/06/2024 139  136 - 145 mmol/L Final    Potassium 11/06/2024 4.1  3.5 - 5.1 mmol/L Final    Chloride 11/06/2024 107  95 - 110 mmol/L Final    CO2 11/06/2024 23  23 - 29 mmol/L Final    Glucose 11/06/2024 88  70 - 110 mg/dL Final    BUN 11/06/2024 16  6 - 20 mg/dL Final    Creatinine 11/06/2024 0.8  0.5 - 1.4 mg/dL Final    Calcium 11/06/2024 9.0  8.7 - 10.5 mg/dL Final    Total Protein 11/06/2024 6.9  6.0 - 8.4 g/dL Final    Albumin 11/06/2024 4.0  3.5 - 5.2 g/dL Final    Total Bilirubin 11/06/2024 0.4  0.1 - 1.0 mg/dL Final    Comment: For infants and newborns, interpretation of results should be based  on gestational age, weight and in agreement with clinical  observations.    Premature Infant recommended reference ranges:  Up to 24  hours.............<8.0 mg/dL  Up to 48 hours............<12.0 mg/dL  3-5 days..................<15.0 mg/dL  6-29 days.................<15.0 mg/dL      Alkaline Phosphatase 11/06/2024 90  40 - 150 U/L Final    AST 11/06/2024 20  10 - 40 U/L Final    ALT 11/06/2024 30  10 - 44 U/L Final    eGFR 11/06/2024 >60.0  >60 mL/min/1.73 m^2 Final    Anion Gap 11/06/2024 9  8 - 16 mmol/L Final    Ferritin 11/06/2024 845 (H)  20.0 - 300.0 ng/mL Final    HFE Result Summary 11/06/2024 COMPLEX (SEE RESULT AND INTERPRETATION)   Final    Hereditary Hemochromatosis 11/06/2024 SEE BELOW   Final    Comment: C282Y: Two copies of the C282Y variant were identified.  H63D: Not detected.      Interpretation 11/06/2024 SEE BELOW   Final    Comment: This individual is homozygous for C282Y. This result may be  consistent with, but does not confirm a diagnosis of or  predisposition for hereditary hemochromatosis (HH).    Sex and serum ferritin levels are known to impact the  penetrance of the p.C282Y allele. For individuals found to  be homozygous for the C282Y variant, those assigned male at  birth are at considerably higher risk to exhibit symptoms  of iron overload or end-organ damage than those assigned  female at birth. Additionally, a serum ferritin level  greater than 1000 ug/L is associated with increased risk  for symptoms related to iron overload.    Penetrance of the genotype may be affected by ethnic  background and coexisting comorbid and environmental  factors. These results should be interpreted in the context  of clinical findings, family history, and other laboratory  testing (e.g. serum transferrin-iron saturation and serum  ferritin).    Since the C282Y variant has been identified, genetic  testing and clinical ev                           aluation of at risk family members  may be considered.    A genetic consultation may be of benefit.    -------------------ADDITIONAL INFORMATION-------------------  An online research  opportunity called GenomeConnect   [email or web address]), a project of [employer], is available for   the recipient of this genetic test. This patient registry   collects de-identified genetic and health information to   advance the knowledge of genetic variants. [facility] is a   collaborator of [employer]. This may not be applicable for all   tests.    Test results should be interpreted in the context of   clinical findings, family history, and other laboratory   data. Misinterpretation of results may occur if the   information provided is inaccurate or incomplete.    Rare polymorphisms exist that could lead to false-negative   or false-positive results. If results obtained do not match   the clinical findings, additional testing should be   considered.    Bone Marrow transplants from allogenic donors will   i                           nterfere with testing. Call [facility] for   instructions for testing patients who have received a bone   marrow transplant.    One or more in silico tools were used to assist in the   interpretation of these results. These tools are updated   regularly and predictions for a given variant may change.    Additionally, the predictability of these tools for the   determination of pathogenicity is currently unvalidated.    This test was developed and its performance characteristics   determined by [facility] in a manner consistent with CLIA   requirements. This test has not been cleared or approved by   the U.S. Food and Drug Administration.      Specimen 11/06/2024 WB Whole Blood   Final    Source 11/06/2024 Test Not Performed   Final    Method 11/06/2024 SEE BELOW   Final    Comment: Droplet Digital Polymerase Chain Reaction (ddPCR) was used  to test for the following three variants in the HFE gene;  C282Y, H63D, and S65C. Because of the minimal effect on  iron metabolism associated with the S65C variant, it is  only reported when it is found with the C282Y variant (i.e.  if  "the patient has the C282Y/S65C genotype).      HFE Released By 11/06/2024 SEE BELOW   Final    Comment: Salvador Carcamo MD    A portion of the testing process was performed at [facility] site [id number].    Test Performed by:  [address] [address]  : Kaleb Gongora Ph.D.; CLIA# [id number]         Lab Results   Component Value Date    INR 1.0 10/15/2024       Lab Results   Component Value Date    SMOOTHMUSCAB Negative 1:40 10/15/2024     Lab Results   Component Value Date    IRON 142 11/13/2024    TIBC 290 11/13/2024    FERRITIN 971 (H) 11/13/2024     Lab Results   Component Value Date    HEPAIGM Non-reactive 10/15/2024    HEPBIGM Non-reactive 10/15/2024    HEPBCAB Non-reactive 10/15/2024    HEPCAB Non-reactive 10/15/2024    HEPCAB Non-reactive 10/15/2024     Lab Results   Component Value Date    TSH 1.564 02/23/2024     Lab Results   Component Value Date    AST 24 11/13/2024    ALT 40 11/13/2024    GGT 38 03/04/2024    INR 1.0 10/15/2024    ANASCREEN Negative <1:80 10/15/2024    SMOOTHMUSCAB Negative 1:40 10/15/2024    TTGIGA 0.3 10/15/2024     No results found for: "ABORH"    Lab Results   Component Value Date    ANASCREEN Negative <1:80 10/15/2024    SMOOTHMUSCAB Negative 1:40 10/15/2024    AMAIFA Negative 1:40 10/15/2024    HEPAIGG Non-reactive 10/15/2024    HEPBCAB Non-reactive 10/15/2024    HEPBSAB <3.00 10/15/2024    HEPBSAB Non-reactive 10/15/2024    A3TPTCNQDC 111 10/15/2024     Lab Results   Component Value Date    CERULOPLSM 28.0 10/15/2024        Lab Results   Component Value Date    HGBA1C 5.0 02/23/2024     Lab Results   Component Value Date    CHOL 210 (H) 02/23/2024    CHOL 233 (H) 03/01/2023    CHOL 224 (H) 12/01/2021     Lab Results   Component Value Date    HDL 41 02/23/2024    HDL 48 03/01/2023    HDL 43 12/01/2021     Lab Results   Component Value Date    LDLCALC 126.6 02/23/2024    LDLCALC 132.2 " "03/01/2023    LDLCALC 124.6 12/01/2021     Lab Results   Component Value Date    TRIG 212 (H) 02/23/2024    TRIG 264 (H) 03/01/2023    TRIG 282 (H) 12/01/2021     Lab Results   Component Value Date    CHOLHDL 19.5 (L) 02/23/2024    CHOLHDL 20.6 03/01/2023    CHOLHDL 19.2 (L) 12/01/2021         I personally reviewed imaging studies and outside records..      Assessment:       1. Abnormal finding on imaging of liver    2. Hepatic steatosis    3. Iron overload    4. BMI 29.0-29.9,adult          Plan:     Problem List Items Addressed This Visit    None  Visit Diagnoses       Abnormal finding on imaging of liver    -  Primary    Relevant Orders    Comprehensive Metabolic Panel    Hepatic steatosis        Relevant Orders    Comprehensive Metabolic Panel    Iron overload        Relevant Orders    Ambulatory referral/consult to Hematology / Oncology    Comprehensive Metabolic Panel    BMI 29.0-29.9,adult        Relevant Orders    Comprehensive Metabolic Panel              Iban Chavez" was seen today for fatty liver.    Diagnoses and all orders for this visit:    Abnormal finding on imaging of liver  -     Comprehensive Metabolic Panel; Standing    Hepatic steatosis  -     Comprehensive Metabolic Panel; Standing    Iron overload  -     Ambulatory referral/consult to Hematology / Oncology; Future  -     Comprehensive Metabolic Panel; Standing    BMI 29.0-29.9,adult  -     Comprehensive Metabolic Panel; Standing        Recommendations    Hematology referral for 2 copies of C282Y  variant were identified.     Offered MRI to assess iron in liver. The patient want to wait until he see hematologist and cardiologist. He wants to see if he also need to scan other part of his body.  Then CMP, ferritin in 6 months before next visit.   Pending Fibroscan result.    I recommended a more pragmatic approach to his medical problems which would include the following:  - life-style modifications: weight loss, daily exercise (30 " mins of HIIT or cardio), low carb/low fat diet  - Educated patient on spectrum of fatty liver disease and potential for cirrhosis if MASH present  - Explored dietary habits and discussed dietary recommendations- Low calorie, low carbohydrate, high protein mediterranean diet with goal of loosing >3-5% to improve steatosis and >7-10% to improve histological changes  Recommend alcohol abstinence.    I explain about hemochromatosis and possible treatment plan. All questions were answered. The patient agrees with the plan. He said that he will have other family members to be tested for C282Y variant.    Return to clinic in 6 months.      Time Statement  A total 34 minutes spent includes time preparing to see patient, reviewing  diagnostic studies and records, direct face-to-face visit, completing orders, medications , reconciliation, prescription management, and care coordination    We discussed in depth the nature of the patient's disease, the management plan in details. I have provided the patient with an opportunity to ask questions and have all questions answered to his satisfaction.     Discussed with patient that it is likely that He will see results before Myself or my nurse are able to view them and report results due to the Cures Act passed 4/1/21. Results will be sent immediately to the patient who are enrolled in the patient portal. If results come through after business hours or on weekend, we will not see them until the next business day that we are in the office. If resulted during the business day, we will likely not be able to review them until after completing all patient visits in office that day.     Filemon Villegas, P  [facility]

## 2024-11-26 ENCOUNTER — HOSPITAL ENCOUNTER (OUTPATIENT)
Dept: CARDIOLOGY | Facility: HOSPITAL | Age: 44
Discharge: HOME | End: 2024-11-26
Attending: STUDENT IN AN ORGANIZED HEALTH CARE EDUCATION/TRAINING PROGRAM
Payer: COMMERCIAL

## 2024-11-26 VITALS
DIASTOLIC BLOOD PRESSURE: 90 MMHG | WEIGHT: 189 LBS | HEIGHT: 67 IN | SYSTOLIC BLOOD PRESSURE: 132 MMHG | BODY MASS INDEX: 29.66 KG/M2

## 2024-11-26 DIAGNOSIS — I10 ESSENTIAL HYPERTENSION: ICD-10-CM

## 2024-11-26 DIAGNOSIS — R53.83 FATIGUE, UNSPECIFIED TYPE: ICD-10-CM

## 2024-11-26 DIAGNOSIS — E78.49 OTHER HYPERLIPIDEMIA: ICD-10-CM

## 2024-11-26 DIAGNOSIS — Z82.49 FAMILY HISTORY OF HEART DISEASE: ICD-10-CM

## 2024-11-26 LAB
CV STRESS BASE HR: 81 BPM
DIASTOLIC BLOOD PRESSURE: 92 MMHG
OHS CV CPX 85 PERCENT MAX PREDICTED HEART RATE MALE: 148
OHS CV CPX ESTIMATED METS: 12
OHS CV CPX MAX PREDICTED HEART RATE: 174
OHS CV CPX PATIENT IS FEMALE: 0
OHS CV CPX PATIENT IS MALE: 1
OHS CV CPX PEAK DIASTOLIC BLOOD PRESSURE: 84 MMHG
OHS CV CPX PEAK HEAR RATE: 173 BPM
OHS CV CPX PEAK RATE PRESSURE PRODUCT: NORMAL
OHS CV CPX PEAK SYSTOLIC BLOOD PRESSURE: 206 MMHG
OHS CV CPX PERCENT MAX PREDICTED HEART RATE ACHIEVED: 99
OHS CV CPX RATE PRESSURE PRODUCT PRESENTING: NORMAL
STRESS ECHO POST EXERCISE DUR MIN: 10 MINUTES
STRESS ECHO POST EXERCISE DUR SEC: 17 SECONDS
SYSTOLIC BLOOD PRESSURE: 141 MMHG

## 2024-11-26 PROCEDURE — 93306 TTE W/DOPPLER COMPLETE: CPT | Mod: 26,,, | Performed by: INTERNAL MEDICINE

## 2024-11-26 PROCEDURE — 93018 CV STRESS TEST I&R ONLY: CPT | Mod: ,,, | Performed by: INTERNAL MEDICINE

## 2024-11-26 PROCEDURE — 93016 CV STRESS TEST SUPVJ ONLY: CPT | Mod: ,,, | Performed by: INTERNAL MEDICINE

## 2024-11-26 PROCEDURE — 93306 TTE W/DOPPLER COMPLETE: CPT

## 2024-11-26 PROCEDURE — 93017 CV STRESS TEST TRACING ONLY: CPT

## 2024-11-29 LAB
AORTIC ROOT ANNULUS: 3.06 CM
AV INDEX (PROSTH): 0.81
AV MEAN GRADIENT: 2.7 MMHG
AV PEAK GRADIENT: 5.8 MMHG
AV VALVE AREA BY VELOCITY RATIO: 3.4 CM²
AV VALVE AREA: 3.7 CM²
AV VELOCITY RATIO: 0.75
BSA FOR ECHO PROCEDURE: 2.01 M2
CV ECHO LV RWT: 0.48 CM
DOP CALC AO PEAK VEL: 1.2 M/S
DOP CALC AO VTI: 24.3 CM
DOP CALC LVOT AREA: 4.5 CM2
DOP CALC LVOT DIAMETER: 2.4 CM
DOP CALC LVOT PEAK VEL: 0.9 M/S
DOP CALC LVOT STROKE VOLUME: 89.5 CM3
DOP CALC RVOT PEAK VEL: 0.83 M/S
DOP CALC RVOT VTI: 19.7 CM
DOP CALCLVOT PEAK VEL VTI: 19.8 CM
E WAVE DECELERATION TIME: 158.94 MSEC
E/A RATIO: 1.44
E/E' RATIO: 7.58 M/S
ECHO LV POSTERIOR WALL: 1 CM (ref 0.6–1.1)
EJECTION FRACTION: 60 %
FRACTIONAL SHORTENING: 38.1 % (ref 28–44)
INTERVENTRICULAR SEPTUM: 1.1 CM (ref 0.6–1.1)
IVRT: 99.9 MSEC
LA MAJOR: 3.66 CM
LA MINOR: 4.21 CM
LA WIDTH: 3.3 CM
LEFT ATRIUM AREA SYSTOLIC (APICAL 2 CHAMBER): 16.35 CM2
LEFT ATRIUM AREA SYSTOLIC (APICAL 4 CHAMBER): 13.53 CM2
LEFT ATRIUM SIZE: 3.1 CM
LEFT ATRIUM VOLUME INDEX MOD: 20.2 ML/M2
LEFT ATRIUM VOLUME INDEX: 17.3 ML/M2
LEFT ATRIUM VOLUME MOD: 39.72 ML
LEFT ATRIUM VOLUME: 34.05 CM3
LEFT INTERNAL DIMENSION IN SYSTOLE: 2.6 CM (ref 2.1–4)
LEFT VENTRICLE DIASTOLIC VOLUME INDEX: 40.21 ML/M2
LEFT VENTRICLE DIASTOLIC VOLUME: 79.21 ML
LEFT VENTRICLE END SYSTOLIC VOLUME APICAL 2 CHAMBER: 41.99 ML
LEFT VENTRICLE END SYSTOLIC VOLUME APICAL 4 CHAMBER: 33.62 ML
LEFT VENTRICLE MASS INDEX: 74.6 G/M2
LEFT VENTRICLE SYSTOLIC VOLUME INDEX: 12.8 ML/M2
LEFT VENTRICLE SYSTOLIC VOLUME: 25.13 ML
LEFT VENTRICULAR INTERNAL DIMENSION IN DIASTOLE: 4.2 CM (ref 3.5–6)
LEFT VENTRICULAR MASS: 147 G
LV LATERAL E/E' RATIO: 6.55 M/S
LV SEPTAL E/E' RATIO: 9 M/S
LVED V (TEICH): 79.21 ML
LVES V (TEICH): 25.13 ML
LVOT MG: 1.7 MMHG
LVOT MV: 0.61 CM/S
MV PEAK A VEL: 0.5 M/S
MV PEAK E VEL: 0.72 M/S
MV STENOSIS PRESSURE HALF TIME: 46.09 MS
MV VALVE AREA P 1/2 METHOD: 4.77 CM2
PISA TR MAX VEL: 2.07 M/S
PULM VEIN S/D RATIO: 1.36
PV MEAN GRADIENT: 2 MMHG
PV PEAK D VEL: 0.39 M/S
PV PEAK GRADIENT: 4 MMHG
PV PEAK S VEL: 0.53 M/S
PV PEAK VELOCITY: 0.98 M/S
RA MAJOR: 3.91 CM
RA PRESSURE ESTIMATED: 3 MMHG
RA WIDTH: 3.29 CM
RIGHT VENTRICULAR END-DIASTOLIC DIMENSION: 3.25 CM
RV TB RVSP: 5 MMHG
SINUS: 3.02 CM
STJ: 3.13 CM
TDI LATERAL: 0.11 M/S
TDI SEPTAL: 0.08 M/S
TDI: 0.1 M/S
TR MAX PG: 17 MMHG
TRICUSPID ANNULAR PLANE SYSTOLIC EXCURSION: 2.02 CM
TV REST PULMONARY ARTERY PRESSURE: 20 MMHG
Z-SCORE OF LEFT VENTRICULAR DIMENSION IN END DIASTOLE: -2.99
Z-SCORE OF LEFT VENTRICULAR DIMENSION IN END SYSTOLE: -2.3

## 2024-12-19 ENCOUNTER — OFFICE VISIT (OUTPATIENT)
Dept: CARDIOLOGY | Facility: CLINIC | Age: 44
End: 2024-12-19
Payer: COMMERCIAL

## 2024-12-19 VITALS
HEIGHT: 67 IN | DIASTOLIC BLOOD PRESSURE: 82 MMHG | BODY MASS INDEX: 28.04 KG/M2 | SYSTOLIC BLOOD PRESSURE: 104 MMHG | WEIGHT: 178.63 LBS | HEART RATE: 59 BPM

## 2024-12-19 DIAGNOSIS — Z00.00 ROUTINE ADULT HEALTH MAINTENANCE: ICD-10-CM

## 2024-12-19 DIAGNOSIS — I10 ESSENTIAL HYPERTENSION: Primary | ICD-10-CM

## 2024-12-19 DIAGNOSIS — E78.49 OTHER HYPERLIPIDEMIA: ICD-10-CM

## 2024-12-19 DIAGNOSIS — G47.33 OSA ON CPAP: ICD-10-CM

## 2024-12-19 PROCEDURE — 99999 PR PBB SHADOW E&M-EST. PATIENT-LVL III: CPT | Mod: PBBFAC,,,

## 2024-12-19 PROCEDURE — 99214 OFFICE O/P EST MOD 30 MIN: CPT | Mod: S$GLB,,,

## 2024-12-19 NOTE — PROGRESS NOTES
Subjective:   Patient ID:  Iban Amos is a 44 y.o. male who presents for evaluation of No chief complaint on file.      HPI 44-year-old male whose current medical conditions include HTN, HLP, BHAVIK, fatty liver recently diagnosed with hemochromatosis he is waiting to see Hematology, BPH previously followed by Dr. Alston in [facility]iology [facility] given family history of heart disease.  Here today for CV follow-up denies any chest pain, angina or anginal equivalent.  Last visit his olmesartan was increased to 40 mg daily, denies any headaches, dizziness or near-syncope.  Blood pressure in clinic today 104/82, also recently lost 12 lb    11/26/24 Echo with normal EF  9/6/24 48 hour Holter with no concerning arrhythmias  11/26/24 Exercise stress test negative for ischemia    Tobacco none  Exercise biking 3x week  FH father CAD CABG 60s, HTN, CVA  Past Medical History:   Diagnosis Date    Sleep apnea     Unspecified essential hypertension        Past Surgical History:   Procedure Laterality Date    COLONOSCOPY N/A 9/18/2024    Procedure: COLONOSCOPY;  Surgeon: Ricco Irby MD;  Location: [facility];  Service: Gastroenterology;  Laterality: N/A;    FRACTURE SURGERY  2010    RECONSTRUCTION OF NOSE      TONSILLECTOMY, ADENOIDECTOMY         Social History     Tobacco Use    Smoking status: Never    Smokeless tobacco: Never   Substance Use Topics    Alcohol use: Not Currently    Drug use: No       Family History   Problem Relation Name Age of Onset    Breast cancer Mother M     Cancer Mother M     Hypertension Father D     Heart disease Father D     Stroke Father D     Skin cancer Father D     Diabetes Brother C     Obesity Brother      Cancer Maternal Grandmother          unknown type    Cancer Maternal Grandfather          unknown type    Lung cancer Other      Inflammatory bowel disease Neg Hx      Rambo's disease Neg Hx         Current Outpatient Medications on File Prior to Visit   Medication Sig Dispense  Refill    cetirizine (ZYRTEC) 10 MG tablet Take 10 mg by mouth once daily.      naproxen sodium (ANAPROX) 220 MG tablet Take 220 mg by mouth every 12 (twelve) hours.      olmesartan (BENICAR) 40 MG tablet Take 1 tablet (40 mg total) by mouth once daily. 90 tablet 3    hepatitis A and B vaccine, PF, (TWINRIX) 720 ROXANA unit- 20 mcg/mL Syrg suspension Inject 1 mL (720 Units total) into the muscle As instructed (The first dose now, the second dose in 1 months, then the thrid dose in 6 months.). Give one dose now, a second dose in one month, and a third dose in six months. (Patient not taking: Reported on 12/19/2024) 1 mL 2     No current facility-administered medications on file prior to visit.      Wt Readings from Last 3 Encounters:   12/19/24 81 kg (178 lb 9.6 oz)   11/26/24 85.7 kg (189 lb)   11/15/24 85.9 kg (189 lb 6 oz)     Temp Readings from Last 3 Encounters:   11/01/24 97.9 °F (36.6 °C) (Oral)   09/18/24 97 °F (36.1 °C) (Temporal)   03/04/24 96.6 °F (35.9 °C) (Tympanic)     BP Readings from Last 3 Encounters:   12/19/24 104/82   11/26/24 (!) 132/90   11/15/24 (!) 132/90     Pulse Readings from Last 3 Encounters:   12/19/24 (!) 59   11/15/24 60   11/15/24 (!) 54        Review of Systems   Constitutional: Negative.   HENT: Negative.     Eyes: Negative.    Cardiovascular: Negative.    Respiratory: Negative.     Skin: Negative.    Musculoskeletal: Negative.    Gastrointestinal: Negative.    Genitourinary: Negative.    Neurological: Negative.    Psychiatric/Behavioral: Negative.         Objective:   Physical Exam  Vitals and nursing note reviewed.   Constitutional:       Appearance: Normal appearance.   HENT:      Head: Normocephalic and atraumatic.   Eyes:      General:         Right eye: No discharge.         Left eye: No discharge.      Pupils: Pupils are equal, round, and reactive to light.   Cardiovascular:      Rate and Rhythm: Bradycardia present.      Heart sounds: S1 normal and S2 normal. No murmur  heard.     No friction rub.   Pulmonary:      Effort: Pulmonary effort is normal. No respiratory distress.      Breath sounds: Normal breath sounds. No rales.   Abdominal:      Palpations: Abdomen is soft.      Tenderness: There is no abdominal tenderness.   Musculoskeletal:      Cervical back: Neck supple.      Right lower leg: No edema.      Left lower leg: No edema.   Skin:     General: Skin is warm and dry.   Neurological:      General: No focal deficit present.      Mental Status: He is alert and oriented to person, place, and time.   Psychiatric:         Mood and Affect: Mood normal.         Behavior: Behavior normal.         Thought Content: Thought content normal.         Lab Results   Component Value Date    CHOL 210 (H) 02/23/2024    CHOL 233 (H) 03/01/2023    CHOL 224 (H) 12/01/2021     Lab Results   Component Value Date    HDL 41 02/23/2024    HDL 48 03/01/2023    HDL 43 12/01/2021     Lab Results   Component Value Date    LDLCALC 126.6 02/23/2024    LDLCALC 132.2 03/01/2023    LDLCALC 124.6 12/01/2021     Lab Results   Component Value Date    TRIG 212 (H) 02/23/2024    TRIG 264 (H) 03/01/2023    TRIG 282 (H) 12/01/2021     Lab Results   Component Value Date    CHOLHDL 19.5 (L) 02/23/2024    CHOLHDL 20.6 03/01/2023    CHOLHDL 19.2 (L) 12/01/2021       Chemistry        Component Value Date/Time     11/13/2024 0825    K 4.0 11/13/2024 0825     11/13/2024 0825    CO2 20 (L) 11/13/2024 0825    BUN 20 11/13/2024 0825    CREATININE 0.9 11/13/2024 0825    GLU 86 11/13/2024 0825        Component Value Date/Time    CALCIUM 8.9 11/13/2024 0825    ALKPHOS 92 11/13/2024 0825    AST 24 11/13/2024 0825    ALT 40 11/13/2024 0825    BILITOT 0.4 11/13/2024 0825    ESTGFRAFRICA >60.0 12/01/2021 0820    EGFRNONAA >60.0 12/01/2021 0820          Lab Results   Component Value Date    TSH 1.564 02/23/2024     Lab Results   Component Value Date    INR 1.0 10/15/2024      @RESUFAST(WBC,HGB,HCT,MCV,PLT)  @LABRCNTIP(BNP,BNPTRIAGEBLO)@  CrCl cannot be calculated (Patient's most recent lab result is older than the maximum 7 days allowed.).     Results for orders placed during the hospital encounter of 11/26/24    Echo    Interpretation Summary    Left Ventricle: The left ventricle is normal in size. Normal wall thickness. There is concentric remodeling. There is normal systolic function. Ejection fraction is approximately 60%. There is normal diastolic function.    Right Ventricle: Normal right ventricular cavity size. Wall thickness is normal. Systolic function is normal.    Pulmonary Artery: The estimated pulmonary artery systolic pressure is 20 mmHg.    IVC/SVC: Normal venous pressure at 3 mmHg.     Results for orders placed during the hospital encounter of 11/26/24    Exercise Stress - EKG    Interpretation Summary    The ECG portion of the study is negative for ischemia.    The patient reported no chest pain during the stress test.    The exercise capacity was excellent.    The patient exercised for 10 minutes 17 seconds on a Naseem protocol, corresponding to a functional capacity of 12 estimated METS, achieving a peak heart rate of 173 bpm, which is 99% of the age predicted maximum heart rate. Their exercise capacity was excellent.     Assessment:      1. Essential hypertension    2. Other hyperlipidemia    3. BHAVIK on CPAP        Plan:   Essential hypertension    Other hyperlipidemia    BHAVIK on CPAP      Continue olmesartan 40 mg daily, low-sodium diet, profile blood pressure-HTN  Low-fat, high-fiber diet, discussed his LDL goal is less than 100- Hlp  CPAP- BHAVIK  Daily exercise as tolerated   Risk factor modifications     Return to clinic in 1 year or sooner if needed    Courtney Guillot, FNP-C [facility], Cardiology

## 2025-01-14 ENCOUNTER — INFUSION (OUTPATIENT)
Dept: INFUSION THERAPY | Facility: HOSPITAL | Age: 45
End: 2025-01-14
Attending: INTERNAL MEDICINE
Payer: COMMERCIAL

## 2025-01-14 ENCOUNTER — LAB VISIT (OUTPATIENT)
Dept: LAB | Facility: HOSPITAL | Age: 45
End: 2025-01-14
Attending: INTERNAL MEDICINE
Payer: COMMERCIAL

## 2025-01-14 ENCOUNTER — OFFICE VISIT (OUTPATIENT)
Dept: HEMATOLOGY/ONCOLOGY | Facility: CLINIC | Age: 45
End: 2025-01-14
Payer: COMMERCIAL

## 2025-01-14 VITALS
BODY MASS INDEX: 29.24 KG/M2 | HEART RATE: 71 BPM | TEMPERATURE: 98 F | DIASTOLIC BLOOD PRESSURE: 85 MMHG | WEIGHT: 186.31 LBS | HEIGHT: 67 IN | SYSTOLIC BLOOD PRESSURE: 119 MMHG | RESPIRATION RATE: 20 BRPM | OXYGEN SATURATION: 96 %

## 2025-01-14 VITALS
SYSTOLIC BLOOD PRESSURE: 141 MMHG | OXYGEN SATURATION: 97 % | TEMPERATURE: 98 F | RESPIRATION RATE: 16 BRPM | HEART RATE: 69 BPM | DIASTOLIC BLOOD PRESSURE: 100 MMHG

## 2025-01-14 DIAGNOSIS — E83.110 HEREDITARY HEMOCHROMATOSIS: Primary | ICD-10-CM

## 2025-01-14 DIAGNOSIS — R74.8 ELEVATED LIVER ENZYMES: ICD-10-CM

## 2025-01-14 DIAGNOSIS — E83.19 IRON OVERLOAD: ICD-10-CM

## 2025-01-14 DIAGNOSIS — E83.110 HEREDITARY HEMOCHROMATOSIS: ICD-10-CM

## 2025-01-14 DIAGNOSIS — K76.9 LIVER DISEASE, UNSPECIFIED: ICD-10-CM

## 2025-01-14 LAB
AFP SERPL-MCNC: 3.1 NG/ML (ref 0–8.4)
ALBUMIN SERPL BCP-MCNC: 4.6 G/DL (ref 3.5–5.2)
ALP SERPL-CCNC: 91 U/L (ref 40–150)
ALT SERPL W/O P-5'-P-CCNC: 45 U/L (ref 10–44)
ANION GAP SERPL CALC-SCNC: 13 MMOL/L (ref 8–16)
AST SERPL-CCNC: 25 U/L (ref 10–40)
BASOPHILS # BLD AUTO: 0.07 K/UL (ref 0–0.2)
BASOPHILS NFR BLD: 1 % (ref 0–1.9)
BILIRUB SERPL-MCNC: 0.7 MG/DL (ref 0.1–1)
BUN SERPL-MCNC: 13 MG/DL (ref 6–20)
CALCIUM SERPL-MCNC: 9.7 MG/DL (ref 8.7–10.5)
CHLORIDE SERPL-SCNC: 101 MMOL/L (ref 95–110)
CO2 SERPL-SCNC: 26 MMOL/L (ref 23–29)
CREAT SERPL-MCNC: 1 MG/DL (ref 0.5–1.4)
DIFFERENTIAL METHOD BLD: ABNORMAL
EOSINOPHIL # BLD AUTO: 0.2 K/UL (ref 0–0.5)
EOSINOPHIL NFR BLD: 3.2 % (ref 0–8)
ERYTHROCYTE [DISTWIDTH] IN BLOOD BY AUTOMATED COUNT: 12.3 % (ref 11.5–14.5)
EST. GFR  (NO RACE VARIABLE): >60 ML/MIN/1.73 M^2
FERRITIN SERPL-MCNC: 1153 NG/ML (ref 20–300)
GLUCOSE SERPL-MCNC: 128 MG/DL (ref 70–110)
HCT VFR BLD AUTO: 48.5 % (ref 40–54)
HGB BLD-MCNC: 17.2 G/DL (ref 14–18)
IMM GRANULOCYTES # BLD AUTO: 0.02 K/UL (ref 0–0.04)
IMM GRANULOCYTES NFR BLD AUTO: 0.3 % (ref 0–0.5)
IRON SERPL-MCNC: 225 UG/DL (ref 45–160)
LYMPHOCYTES # BLD AUTO: 2.9 K/UL (ref 1–4.8)
LYMPHOCYTES NFR BLD: 42.4 % (ref 18–48)
MCH RBC QN AUTO: 32.3 PG (ref 27–31)
MCHC RBC AUTO-ENTMCNC: 35.5 G/DL (ref 32–36)
MCV RBC AUTO: 91 FL (ref 82–98)
MONOCYTES # BLD AUTO: 0.6 K/UL (ref 0.3–1)
MONOCYTES NFR BLD: 9.2 % (ref 4–15)
NEUTROPHILS # BLD AUTO: 3 K/UL (ref 1.8–7.7)
NEUTROPHILS NFR BLD: 43.9 % (ref 38–73)
NRBC BLD-RTO: 0 /100 WBC
PLATELET # BLD AUTO: 201 K/UL (ref 150–450)
PMV BLD AUTO: 10.2 FL (ref 9.2–12.9)
POTASSIUM SERPL-SCNC: 4.4 MMOL/L (ref 3.5–5.1)
PROT SERPL-MCNC: 7.9 G/DL (ref 6–8.4)
RBC # BLD AUTO: 5.33 M/UL (ref 4.6–6.2)
SATURATED IRON: 76 % (ref 20–50)
SODIUM SERPL-SCNC: 140 MMOL/L (ref 136–145)
TOTAL IRON BINDING CAPACITY: 296 UG/DL (ref 250–450)
TRANSFERRIN SERPL-MCNC: 200 MG/DL (ref 200–375)
WBC # BLD AUTO: 6.88 K/UL (ref 3.9–12.7)

## 2025-01-14 PROCEDURE — 99205 OFFICE O/P NEW HI 60 MIN: CPT | Mod: S$GLB,,, | Performed by: INTERNAL MEDICINE

## 2025-01-14 PROCEDURE — 82728 ASSAY OF FERRITIN: CPT | Performed by: INTERNAL MEDICINE

## 2025-01-14 PROCEDURE — 99999 PR PBB SHADOW E&M-EST. PATIENT-LVL IV: CPT | Mod: PBBFAC,,, | Performed by: INTERNAL MEDICINE

## 2025-01-14 PROCEDURE — 84466 ASSAY OF TRANSFERRIN: CPT | Performed by: INTERNAL MEDICINE

## 2025-01-14 PROCEDURE — 85025 COMPLETE CBC W/AUTO DIFF WBC: CPT | Performed by: INTERNAL MEDICINE

## 2025-01-14 PROCEDURE — 80053 COMPREHEN METABOLIC PANEL: CPT | Performed by: INTERNAL MEDICINE

## 2025-01-14 PROCEDURE — 82105 ALPHA-FETOPROTEIN SERUM: CPT | Performed by: INTERNAL MEDICINE

## 2025-01-14 PROCEDURE — 99195 PHLEBOTOMY: CPT

## 2025-01-14 PROCEDURE — 36415 COLL VENOUS BLD VENIPUNCTURE: CPT | Performed by: INTERNAL MEDICINE

## 2025-01-14 RX ORDER — HEPARIN 100 UNIT/ML
500 SYRINGE INTRAVENOUS
Status: CANCELLED | OUTPATIENT
Start: 2025-01-14

## 2025-01-14 RX ORDER — LIDOCAINE AND PRILOCAINE 25; 25 MG/G; MG/G
CREAM TOPICAL
Qty: 5 G | Refills: 11 | Status: SHIPPED | OUTPATIENT
Start: 2025-01-14

## 2025-01-14 RX ORDER — SODIUM CHLORIDE 0.9 % (FLUSH) 0.9 %
10 SYRINGE (ML) INJECTION
Status: CANCELLED | OUTPATIENT
Start: 2025-01-14

## 2025-01-14 RX ORDER — SODIUM CHLORIDE 0.9 % (FLUSH) 0.9 %
10 SYRINGE (ML) INJECTION
OUTPATIENT
Start: 2025-01-14

## 2025-01-14 RX ORDER — HEPARIN 100 UNIT/ML
500 SYRINGE INTRAVENOUS
OUTPATIENT
Start: 2025-01-14

## 2025-01-14 NOTE — NURSING
One unit therapeutic phlebotomy performed via left A/C without difficulty.  Upon completion, needle dc'd, pressure dressing applied, and blood discarded in appropriate container.   Juice/snack given.  B/P 141/100 , pulse 69.  Pt denies weakness/dizziness.  Pt discharged with next appt scheduled.

## 2025-01-14 NOTE — PROGRESS NOTES
Subjective:       Patient ID: Iban Amos is a 45 y.o. male.    Chief Complaint: Results    HPI:  45-year-old male documented hemochromatosis.  Homozygous.  Referred by hepatology for evaluation and phlebotomy ECOG status 1    Past Medical History:   Diagnosis Date    Hereditary hemochromatosis 01/14/2025    FE Result Summary    COMPLEX (SEE RESULT AND INTERPRETATION)      Hereditary Hemochromatosis    SEE BELOW      Comment: C282Y: Two copies of the C282Y variant were identified.  H63D: Not detected.      Interpretation    SEE BELOW      Comment: This individual is homozygous for C282Y. This result may be  consistent with, but does not confirm a diagnosis of or  predisposition for hereditary hemochro    Sleep apnea     Unspecified essential hypertension      Family History   Problem Relation Name Age of Onset    Breast cancer Mother M     Cancer Mother M     Hypertension Father D     Heart disease Father D     Stroke Father D     Skin cancer Father D     Diabetes Brother C     Obesity Brother      Cancer Maternal Grandmother          unknown type    Cancer Maternal Grandfather          unknown type    Lung cancer Other      Inflammatory bowel disease Neg Hx      Rambo's disease Neg Hx       Social History     Socioeconomic History    Marital status:     Number of children: 3   Occupational History    Occupation: sales     Employer: [employer]   Tobacco Use    Smoking status: Never    Smokeless tobacco: Never   Substance and Sexual Activity    Alcohol use: Not Currently    Drug use: No    Sexual activity: Yes     Partners: Female     Social Drivers of Health     Financial Resource Strain: Low Risk  (1/7/2025)    Overall Financial Resource Strain (CARDIA)     Difficulty of Paying Living Expenses: Not very hard   Food Insecurity: No Food Insecurity (1/7/2025)    Hunger Vital Sign     Worried About Running Out of Food in the Last Year: Never true     Ran Out of Food in the Last Year: Never true  "  Transportation Needs: No Transportation Needs (4/4/2023)    PRAPARE - Transportation     Lack of Transportation (Medical): No     Lack of Transportation (Non-Medical): No   Physical Activity: Insufficiently Active (1/7/2025)    Exercise Vital Sign     Days of Exercise per Week: 3 days     Minutes of Exercise per Session: 30 min   Stress: No Stress Concern Present (1/7/2025)    [employer] - Occupational Stress Questionnaire     Feeling of Stress : Not at all   Housing Stability: Low Risk  (4/4/2023)    Housing Stability Vital Sign     Unable to Pay for Housing in the Last Year: No     Number of Places Lived in the Last Year: 1     Unstable Housing in the Last Year: No     Past Surgical History:   Procedure Laterality Date    COLONOSCOPY N/A 9/18/2024    Procedure: COLONOSCOPY;  Surgeon: Ricco Irby MD;  Location: [facility];  Service: Gastroenterology;  Laterality: N/A;    FRACTURE SURGERY  2010    RECONSTRUCTION OF NOSE      TONSILLECTOMY, ADENOIDECTOMY         Labs:  Lab Results   Component Value Date    WBC 5.98 11/06/2024    HGB 15.7 11/06/2024    HCT 45.9 11/06/2024    MCV 95 11/06/2024     11/06/2024     BMP  Lab Results   Component Value Date     11/13/2024    K 4.0 11/13/2024     11/13/2024    CO2 20 (L) 11/13/2024    BUN 20 11/13/2024    CREATININE 0.9 11/13/2024    CALCIUM 8.9 11/13/2024    ANIONGAP 12 11/13/2024    ESTGFRAFRICA >60.0 12/01/2021    EGFRNONAA >60.0 12/01/2021     Lab Results   Component Value Date    ALT 40 11/13/2024    AST 24 11/13/2024    GGT 38 03/04/2024    ALKPHOS 92 11/13/2024    BILITOT 0.4 11/13/2024       Lab Results   Component Value Date    IRON 142 11/13/2024    TIBC 290 11/13/2024    FERRITIN 971 (H) 11/13/2024     No results found for: "[id number]"  No results found for: "FOLATE"  Lab Results   Component Value Date    TSH 1.564 02/23/2024         Review of Systems   Constitutional:  Positive for fatigue. Negative for " activity change, appetite change, chills, diaphoresis, fever and unexpected weight change.   HENT:  Negative for congestion, dental problem, drooling, ear discharge, ear pain, facial swelling, hearing loss, mouth sores, nosebleeds, postnasal drip, rhinorrhea, sinus pressure, sneezing, sore throat, tinnitus, trouble swallowing and voice change.    Eyes:  Negative for photophobia, pain, discharge, redness, itching and visual disturbance.   Respiratory:  Negative for apnea, cough, choking, chest tightness, shortness of breath, wheezing and stridor.    Cardiovascular:  Negative for chest pain, palpitations and leg swelling.   Gastrointestinal:  Negative for abdominal distention, abdominal pain, anal bleeding, blood in stool, constipation, diarrhea, nausea, rectal pain and vomiting.   Endocrine: Negative for cold intolerance, heat intolerance, polydipsia, polyphagia and polyuria.   Genitourinary:  Negative for decreased urine volume, difficulty urinating, dysuria, enuresis, flank pain, frequency, genital sores, hematuria, penile discharge, penile pain, penile swelling, scrotal swelling, testicular pain and urgency.   Musculoskeletal:  Negative for arthralgias, back pain, gait problem, joint swelling, myalgias, neck pain and neck stiffness.   Skin:  Negative for color change, pallor, rash and wound.   Allergic/Immunologic: Negative for environmental allergies, food allergies and immunocompromised state.   Neurological:  Positive for weakness. Negative for dizziness, tremors, seizures, syncope, facial asymmetry, speech difficulty, light-headedness, numbness and headaches.   Hematological:  Negative for adenopathy. Does not bruise/bleed easily.   Psychiatric/Behavioral:  Negative for agitation, behavioral problems, confusion, decreased concentration, dysphoric mood, hallucinations, self-injury, sleep disturbance and suicidal ideas. The patient is not nervous/anxious and is not hyperactive.        Objective:      Physical  Exam  Vitals reviewed.   Constitutional:       General: He is not in acute distress.     Appearance: He is well-developed. He is not diaphoretic.   HENT:      Head: Normocephalic.      Right Ear: External ear normal.      Left Ear: External ear normal.      Nose: Nose normal.      Right Sinus: No maxillary sinus tenderness or frontal sinus tenderness.      Left Sinus: No maxillary sinus tenderness or frontal sinus tenderness.      Mouth/Throat:      Pharynx: No oropharyngeal exudate.   Eyes:      General: Lids are normal. No scleral icterus.        Right eye: No discharge.         Left eye: No discharge.      Extraocular Movements:      Right eye: Normal extraocular motion.      Left eye: Normal extraocular motion.      Conjunctiva/sclera:      Right eye: Right conjunctiva is not injected. No hemorrhage.     Left eye: Left conjunctiva is not injected. No hemorrhage.     Pupils: Pupils are equal, round, and reactive to light.   Neck:      Thyroid: No thyromegaly.      Vascular: No JVD.      Trachea: No tracheal deviation.   Cardiovascular:      Rate and Rhythm: Normal rate.   Pulmonary:      Effort: Pulmonary effort is normal. No respiratory distress.      Breath sounds: No stridor.   Abdominal:      General: Bowel sounds are normal.      Palpations: Abdomen is soft. There is no hepatomegaly, splenomegaly or mass.      Tenderness: There is no abdominal tenderness.   Musculoskeletal:         General: No tenderness. Normal range of motion.      Cervical back: Normal range of motion and neck supple.   Lymphadenopathy:      Head:      Right side of head: No posterior auricular or occipital adenopathy.      Left side of head: No posterior auricular or occipital adenopathy.      Cervical: No cervical adenopathy.      Right cervical: No superficial, deep or posterior cervical adenopathy.     Left cervical: No superficial, deep or posterior cervical adenopathy.      Upper Body:      Right upper body: No supraclavicular  adenopathy.      Left upper body: No supraclavicular adenopathy.   Skin:     General: Skin is dry.      Findings: No erythema or rash.      Nails: There is no clubbing.   Neurological:      Mental Status: He is alert and oriented to person, place, and time.      Cranial Nerves: No cranial nerve deficit.      Coordination: Coordination normal.   Psychiatric:         Behavior: Behavior normal.         Thought Content: Thought content normal.         Judgment: Judgment normal.             Assessment:      1. Hereditary hemochromatosis    2. Iron overload    3. Elevated liver enzymes    4. Liver disease, unspecified           Med Onc Chart Routing      Follow up with physician . Return in 1 month to see me with CBC serum iron TIBC ferritin   Follow up with MT    Infusion scheduling note    Injection scheduling note Monthly phlebotomy x6   Labs   Scheduling:  Preferred lab:  Lab interval:  CBC CMP serum iron TIBC ferritin alpha fetoprotein on monthly basis in labs start today   Imaging   Would like baseline MRI of abdomen for liver content iron   Pharmacy appointment    Other referrals                   Plan:     Reviewed information with him baseline laboratory studies today start a phlebotomy on a monthly basis.  Articles from up-to-date followed him for his review discussed rationale for plan treatment baseline MRI of abdomen for iron content.  Discussed implications and answered questions with him        Naeem Camarillo Jr, MD FACP

## 2025-01-15 ENCOUNTER — PATIENT MESSAGE (OUTPATIENT)
Dept: HEMATOLOGY/ONCOLOGY | Facility: CLINIC | Age: 45
End: 2025-01-15
Payer: COMMERCIAL

## 2025-02-05 ENCOUNTER — HOSPITAL ENCOUNTER (OUTPATIENT)
Dept: RADIOLOGY | Facility: HOSPITAL | Age: 45
Discharge: HOME | End: 2025-02-05
Attending: INTERNAL MEDICINE
Payer: COMMERCIAL

## 2025-02-05 DIAGNOSIS — K76.9 LIVER DISEASE, UNSPECIFIED: ICD-10-CM

## 2025-02-05 PROCEDURE — 74183 MRI ABD W/O CNTR FLWD CNTR: CPT | Mod: 26,,, | Performed by: RADIOLOGY

## 2025-02-05 PROCEDURE — 25500020 PHARM REV CODE 255: Performed by: INTERNAL MEDICINE

## 2025-02-05 PROCEDURE — A9585 GADOBUTROL INJECTION: HCPCS | Performed by: INTERNAL MEDICINE

## 2025-02-05 PROCEDURE — 74183 MRI ABD W/O CNTR FLWD CNTR: CPT | Mod: TC

## 2025-02-05 RX ORDER — GADOBUTROL 604.72 MG/ML
8.5 INJECTION INTRAVENOUS
Status: COMPLETED | OUTPATIENT
Start: 2025-02-05 | End: 2025-02-05

## 2025-02-05 RX ADMIN — GADOBUTROL 8.5 ML: 604.72 INJECTION INTRAVENOUS at 12:02

## 2025-02-12 ENCOUNTER — LAB VISIT (OUTPATIENT)
Dept: LAB | Facility: HOSPITAL | Age: 45
End: 2025-02-12
Attending: INTERNAL MEDICINE
Payer: COMMERCIAL

## 2025-02-12 DIAGNOSIS — E83.19 IRON OVERLOAD: ICD-10-CM

## 2025-02-12 DIAGNOSIS — R74.8 ELEVATED LIVER ENZYMES: ICD-10-CM

## 2025-02-12 DIAGNOSIS — E83.110 HEREDITARY HEMOCHROMATOSIS: ICD-10-CM

## 2025-02-12 LAB
AFP SERPL-MCNC: 3.2 NG/ML (ref 0–8.4)
ALBUMIN SERPL BCP-MCNC: 4.2 G/DL (ref 3.5–5.2)
ALP SERPL-CCNC: 85 U/L (ref 40–150)
ALT SERPL W/O P-5'-P-CCNC: 44 U/L (ref 10–44)
ANION GAP SERPL CALC-SCNC: 9 MMOL/L (ref 8–16)
AST SERPL-CCNC: 24 U/L (ref 10–40)
BASOPHILS # BLD AUTO: 0.06 K/UL (ref 0–0.2)
BASOPHILS NFR BLD: 1 % (ref 0–1.9)
BILIRUB SERPL-MCNC: 0.5 MG/DL (ref 0.1–1)
BUN SERPL-MCNC: 20 MG/DL (ref 6–20)
CALCIUM SERPL-MCNC: 9.5 MG/DL (ref 8.7–10.5)
CHLORIDE SERPL-SCNC: 104 MMOL/L (ref 95–110)
CO2 SERPL-SCNC: 28 MMOL/L (ref 23–29)
CREAT SERPL-MCNC: 0.9 MG/DL (ref 0.5–1.4)
DIFFERENTIAL METHOD BLD: ABNORMAL
EOSINOPHIL # BLD AUTO: 0.2 K/UL (ref 0–0.5)
EOSINOPHIL NFR BLD: 3.8 % (ref 0–8)
ERYTHROCYTE [DISTWIDTH] IN BLOOD BY AUTOMATED COUNT: 12.5 % (ref 11.5–14.5)
EST. GFR  (NO RACE VARIABLE): >60 ML/MIN/1.73 M^2
FERRITIN SERPL-MCNC: 758 NG/ML (ref 20–300)
GLUCOSE SERPL-MCNC: 117 MG/DL (ref 70–110)
HCT VFR BLD AUTO: 45.5 % (ref 40–54)
HGB BLD-MCNC: 16.3 G/DL (ref 14–18)
IMM GRANULOCYTES # BLD AUTO: 0.02 K/UL (ref 0–0.04)
IMM GRANULOCYTES NFR BLD AUTO: 0.3 % (ref 0–0.5)
IRON SERPL-MCNC: 152 UG/DL (ref 45–160)
LYMPHOCYTES # BLD AUTO: 2.4 K/UL (ref 1–4.8)
LYMPHOCYTES NFR BLD: 41.5 % (ref 18–48)
MCH RBC QN AUTO: 33.1 PG (ref 27–31)
MCHC RBC AUTO-ENTMCNC: 35.8 G/DL (ref 32–36)
MCV RBC AUTO: 92 FL (ref 82–98)
MONOCYTES # BLD AUTO: 0.5 K/UL (ref 0.3–1)
MONOCYTES NFR BLD: 9.1 % (ref 4–15)
NEUTROPHILS # BLD AUTO: 2.6 K/UL (ref 1.8–7.7)
NEUTROPHILS NFR BLD: 44.3 % (ref 38–73)
NRBC BLD-RTO: 0 /100 WBC
PLATELET # BLD AUTO: 170 K/UL (ref 150–450)
PMV BLD AUTO: 10.2 FL (ref 9.2–12.9)
POTASSIUM SERPL-SCNC: 4.1 MMOL/L (ref 3.5–5.1)
PROT SERPL-MCNC: 7.4 G/DL (ref 6–8.4)
RBC # BLD AUTO: 4.93 M/UL (ref 4.6–6.2)
SATURATED IRON: 50 % (ref 20–50)
SODIUM SERPL-SCNC: 141 MMOL/L (ref 136–145)
TOTAL IRON BINDING CAPACITY: 306 UG/DL (ref 250–450)
TRANSFERRIN SERPL-MCNC: 207 MG/DL (ref 200–375)
WBC # BLD AUTO: 5.83 K/UL (ref 3.9–12.7)

## 2025-02-12 PROCEDURE — 80053 COMPREHEN METABOLIC PANEL: CPT | Performed by: INTERNAL MEDICINE

## 2025-02-12 PROCEDURE — 82728 ASSAY OF FERRITIN: CPT | Performed by: INTERNAL MEDICINE

## 2025-02-12 PROCEDURE — 82105 ALPHA-FETOPROTEIN SERUM: CPT | Performed by: INTERNAL MEDICINE

## 2025-02-12 PROCEDURE — 36415 COLL VENOUS BLD VENIPUNCTURE: CPT | Performed by: INTERNAL MEDICINE

## 2025-02-12 PROCEDURE — 85025 COMPLETE CBC W/AUTO DIFF WBC: CPT | Performed by: INTERNAL MEDICINE

## 2025-02-12 PROCEDURE — 83540 ASSAY OF IRON: CPT | Performed by: INTERNAL MEDICINE

## 2025-02-14 ENCOUNTER — OFFICE VISIT (OUTPATIENT)
Dept: HEMATOLOGY/ONCOLOGY | Facility: CLINIC | Age: 45
End: 2025-02-14
Payer: COMMERCIAL

## 2025-02-14 ENCOUNTER — INFUSION (OUTPATIENT)
Dept: INFUSION THERAPY | Facility: HOSPITAL | Age: 45
End: 2025-02-14
Attending: INTERNAL MEDICINE
Payer: COMMERCIAL

## 2025-02-14 VITALS
OXYGEN SATURATION: 97 % | BODY MASS INDEX: 29.52 KG/M2 | TEMPERATURE: 98 F | SYSTOLIC BLOOD PRESSURE: 133 MMHG | DIASTOLIC BLOOD PRESSURE: 93 MMHG | HEIGHT: 67 IN | OXYGEN SATURATION: 97 % | SYSTOLIC BLOOD PRESSURE: 150 MMHG | RESPIRATION RATE: 20 BRPM | DIASTOLIC BLOOD PRESSURE: 87 MMHG | WEIGHT: 188.06 LBS | RESPIRATION RATE: 18 BRPM | TEMPERATURE: 97 F | HEART RATE: 57 BPM | HEART RATE: 65 BPM

## 2025-02-14 DIAGNOSIS — R74.8 ELEVATED LIVER ENZYMES: ICD-10-CM

## 2025-02-14 DIAGNOSIS — E83.110 HEREDITARY HEMOCHROMATOSIS: Primary | ICD-10-CM

## 2025-02-14 PROCEDURE — 99999 PR PBB SHADOW E&M-EST. PATIENT-LVL III: CPT | Mod: PBBFAC,,, | Performed by: INTERNAL MEDICINE

## 2025-02-14 PROCEDURE — 99195 PHLEBOTOMY: CPT

## 2025-02-14 RX ORDER — HEPARIN 100 UNIT/ML
500 SYRINGE INTRAVENOUS
Status: DISCONTINUED | OUTPATIENT
Start: 2025-02-14 | End: 2025-02-14 | Stop reason: HOSPADM

## 2025-02-14 RX ORDER — HEPARIN 100 UNIT/ML
500 SYRINGE INTRAVENOUS
OUTPATIENT
Start: 2025-02-14

## 2025-02-14 RX ORDER — SODIUM CHLORIDE 0.9 % (FLUSH) 0.9 %
10 SYRINGE (ML) INJECTION
Status: DISCONTINUED | OUTPATIENT
Start: 2025-02-14 | End: 2025-02-14 | Stop reason: HOSPADM

## 2025-02-14 RX ORDER — SODIUM CHLORIDE 0.9 % (FLUSH) 0.9 %
10 SYRINGE (ML) INJECTION
OUTPATIENT
Start: 2025-02-14

## 2025-02-14 NOTE — NURSING
One unit therapeutic phlebotomy performed via right A/C without difficulty.  Upon completion, needle dc'd, pressure dressing applied, and blood discarded in appropriate container.   Juice/snack provided.  B/P 141/95, pulse 69.  Pt denies weakness/dizziness.  Pt discharged with next appt scheduled.

## 2025-02-14 NOTE — PROGRESS NOTES
Subjective:       Patient ID: Iban Amos is a 45 y.o. male.    Chief Complaint: Results    HPI:  45-year-old male history of her hemochromatosis.  Continuing with monthly phlebotomies.  Patient had MRI baseline with moderate iron overload.  Renal cyst noted.  ECOG status 1    Past Medical History:   Diagnosis Date    Hereditary hemochromatosis 01/14/2025    FE Result Summary    COMPLEX (SEE RESULT AND INTERPRETATION)      Hereditary Hemochromatosis    SEE BELOW      Comment: C282Y: Two copies of the C282Y variant were identified.  H63D: Not detected.      Interpretation    SEE BELOW      Comment: This individual is homozygous for C282Y. This result may be  consistent with, but does not confirm a diagnosis of or  predisposition for hereditary hemochro    Sleep apnea     Unspecified essential hypertension      Family History   Problem Relation Name Age of Onset    Breast cancer Mother M     Cancer Mother M     Hypertension Father D     Heart disease Father D     Stroke Father D     Skin cancer Father D     Diabetes Brother C     Obesity Brother      Cancer Maternal Grandmother          unknown type    Cancer Maternal Grandfather          unknown type    Lung cancer Other      Inflammatory bowel disease Neg Hx      Rambo's disease Neg Hx       Social History     Socioeconomic History    Marital status:     Number of children: 3   Occupational History    Occupation: sales     Employer: [employer]   Tobacco Use    Smoking status: Never    Smokeless tobacco: Never   Substance and Sexual Activity    Alcohol use: Not Currently    Drug use: No    Sexual activity: Yes     Partners: Female     Social Drivers of Health     Financial Resource Strain: Low Risk  (1/7/2025)    Overall Financial Resource Strain (CARDIA)     Difficulty of Paying Living Expenses: Not very hard   Food Insecurity: No Food Insecurity (1/7/2025)    Hunger Vital Sign     Worried About Running Out of Food in the Last Year: Never true     " Ran Out of Food in the Last Year: Never true   Transportation Needs: No Transportation Needs (4/4/2023)    PRAPARE - Transportation     Lack of Transportation (Medical): No     Lack of Transportation (Non-Medical): No   Physical Activity: Insufficiently Active (1/7/2025)    Exercise Vital Sign     Days of Exercise per Week: 3 days     Minutes of Exercise per Session: 30 min   Stress: No Stress Concern Present (1/7/2025)    [employer] [place] [employer] - Occupational Stress Questionnaire     Feeling of Stress : Not at all   Housing Stability: Low Risk  (4/4/2023)    Housing Stability Vital Sign     Unable to Pay for Housing in the Last Year: No     Number of Places Lived in the Last Year: 1     Unstable Housing in the Last Year: No     Past Surgical History:   Procedure Laterality Date    COLONOSCOPY N/A 9/18/2024    Procedure: COLONOSCOPY;  Surgeon: Ricco Irby MD;  Location: [facility];  Service: Gastroenterology;  Laterality: N/A;    FRACTURE SURGERY  2010    RECONSTRUCTION OF NOSE      TONSILLECTOMY, ADENOIDECTOMY         Labs:  Lab Results   Component Value Date    WBC 5.83 02/12/2025    HGB 16.3 02/12/2025    HCT 45.5 02/12/2025    MCV 92 02/12/2025     02/12/2025     BMP  Lab Results   Component Value Date     02/12/2025    K 4.1 02/12/2025     02/12/2025    CO2 28 02/12/2025    BUN 20 02/12/2025    CREATININE 0.9 02/12/2025    CALCIUM 9.5 02/12/2025    ANIONGAP 9 02/12/2025    ESTGFRAFRICA >60.0 12/01/2021    EGFRNONAA >60.0 12/01/2021     Lab Results   Component Value Date    ALT 44 02/12/2025    AST 24 02/12/2025    GGT 38 03/04/2024    ALKPHOS 85 02/12/2025    BILITOT 0.5 02/12/2025       Lab Results   Component Value Date    IRON 152 02/12/2025    TIBC 306 02/12/2025    FERRITIN 758 (H) 02/12/2025     No results found for: "SZKBITXD84"  No results found for: "FOLATE"  Lab Results   Component Value Date    TSH 1.564 02/23/2024         Review of Systems "   Constitutional:  Negative for activity change, appetite change, chills, diaphoresis, fatigue, fever and unexpected weight change.   HENT:  Negative for congestion, dental problem, drooling, ear discharge, ear pain, facial swelling, hearing loss, mouth sores, nosebleeds, postnasal drip, rhinorrhea, sinus pressure, sneezing, sore throat, tinnitus, trouble swallowing and voice change.    Eyes:  Negative for photophobia, pain, discharge, redness, itching and visual disturbance.   Respiratory:  Negative for apnea, cough, choking, chest tightness, shortness of breath, wheezing and stridor.    Cardiovascular:  Negative for chest pain, palpitations and leg swelling.   Gastrointestinal:  Negative for abdominal distention, abdominal pain, anal bleeding, blood in stool, constipation, diarrhea, nausea, rectal pain and vomiting.   Endocrine: Negative for cold intolerance, heat intolerance, polydipsia, polyphagia and polyuria.   Genitourinary:  Negative for decreased urine volume, difficulty urinating, dysuria, enuresis, flank pain, frequency, genital sores, hematuria, penile discharge, penile pain, penile swelling, scrotal swelling, testicular pain and urgency.   Musculoskeletal:  Negative for arthralgias, back pain, gait problem, joint swelling, myalgias, neck pain and neck stiffness.   Skin:  Negative for color change, pallor, rash and wound.   Allergic/Immunologic: Negative for environmental allergies, food allergies and immunocompromised state.   Neurological:  Negative for dizziness, tremors, seizures, syncope, facial asymmetry, speech difficulty, weakness, light-headedness, numbness and headaches.   Hematological:  Negative for adenopathy. Does not bruise/bleed easily.   Psychiatric/Behavioral:  Negative for agitation, behavioral problems, confusion, decreased concentration, dysphoric mood, hallucinations, self-injury, sleep disturbance and suicidal ideas. The patient is not nervous/anxious and is not hyperactive.         Objective:      Physical Exam  Vitals reviewed.   Constitutional:       General: He is not in acute distress.     Appearance: He is well-developed. He is not diaphoretic.   HENT:      Head: Normocephalic.      Right Ear: External ear normal.      Left Ear: External ear normal.      Nose: Nose normal.      Right Sinus: No maxillary sinus tenderness or frontal sinus tenderness.      Left Sinus: No maxillary sinus tenderness or frontal sinus tenderness.      Mouth/Throat:      Pharynx: No oropharyngeal exudate.   Eyes:      General: Lids are normal. No scleral icterus.        Right eye: No discharge.         Left eye: No discharge.      Extraocular Movements:      Right eye: Normal extraocular motion.      Left eye: Normal extraocular motion.      Conjunctiva/sclera:      Right eye: Right conjunctiva is not injected. No hemorrhage.     Left eye: Left conjunctiva is not injected. No hemorrhage.     Pupils: Pupils are equal, round, and reactive to light.   Neck:      Thyroid: No thyromegaly.      Vascular: No JVD.      Trachea: No tracheal deviation.   Cardiovascular:      Rate and Rhythm: Normal rate.   Pulmonary:      Effort: Pulmonary effort is normal. No respiratory distress.      Breath sounds: No stridor.   Abdominal:      General: Bowel sounds are normal.      Palpations: Abdomen is soft. There is no hepatomegaly, splenomegaly or mass.      Tenderness: There is no abdominal tenderness.   Musculoskeletal:         General: No tenderness. Normal range of motion.      Cervical back: Normal range of motion and neck supple.   Lymphadenopathy:      Head:      Right side of head: No posterior auricular or occipital adenopathy.      Left side of head: No posterior auricular or occipital adenopathy.      Cervical: No cervical adenopathy.      Right cervical: No superficial, deep or posterior cervical adenopathy.     Left cervical: No superficial, deep or posterior cervical adenopathy.      Upper Body:      Right upper  body: No supraclavicular adenopathy.      Left upper body: No supraclavicular adenopathy.   Skin:     General: Skin is dry.      Findings: No erythema or rash.      Nails: There is no clubbing.   Neurological:      Mental Status: He is alert and oriented to person, place, and time.      Cranial Nerves: No cranial nerve deficit.      Coordination: Coordination normal.   Psychiatric:         Behavior: Behavior normal.         Thought Content: Thought content normal.         Judgment: Judgment normal.             Assessment:      1. Hereditary hemochromatosis    2. Elevated liver enzymes           Med Onc Chart Routing      Follow up with physician . Return to clinic in 4 months after 3 1 unit phlebotomies has been done with CBC CMP serum iron TIBC ferritin alpha fetoprotein prior   Follow up with [facility]    Infusion scheduling note    Injection scheduling note Phlebotomy monthly x3   Labs    Imaging    Pharmacy appointment    Other referrals                   Plan:     Iron overload continue with phlebotomies on a monthly basis dramatic fall in iron ferritin.  Reviewed MR with liver findings as well as cyst noted will take MR to outside Urology for review.  Discussed above        Naeem Camarillo Jr, MD FACP

## 2025-02-24 ENCOUNTER — PATIENT MESSAGE (OUTPATIENT)
Dept: PRIMARY CARE CLINIC | Facility: CLINIC | Age: 45
End: 2025-02-24
Payer: COMMERCIAL

## 2025-02-24 ENCOUNTER — PATIENT MESSAGE (OUTPATIENT)
Dept: HEPATOLOGY | Facility: CLINIC | Age: 45
End: 2025-02-24
Payer: COMMERCIAL

## 2025-03-04 ENCOUNTER — OFFICE VISIT (OUTPATIENT)
Dept: INTERNAL MEDICINE | Facility: CLINIC | Age: 45
End: 2025-03-04
Payer: COMMERCIAL

## 2025-03-04 VITALS
OXYGEN SATURATION: 95 % | TEMPERATURE: 97 F | DIASTOLIC BLOOD PRESSURE: 84 MMHG | SYSTOLIC BLOOD PRESSURE: 130 MMHG | HEIGHT: 67 IN | BODY MASS INDEX: 30.27 KG/M2 | WEIGHT: 192.88 LBS | HEART RATE: 68 BPM

## 2025-03-04 DIAGNOSIS — Z86.39 HISTORY OF ELEVATED GLUCOSE: ICD-10-CM

## 2025-03-04 DIAGNOSIS — Z80.3 FAMILY HISTORY OF BREAST CANCER: ICD-10-CM

## 2025-03-04 DIAGNOSIS — N13.8 BENIGN PROSTATIC HYPERPLASIA WITH URINARY OBSTRUCTION: ICD-10-CM

## 2025-03-04 DIAGNOSIS — Z00.00 ANNUAL WELLNESS VISIT: Primary | ICD-10-CM

## 2025-03-04 DIAGNOSIS — I10 ESSENTIAL HYPERTENSION: ICD-10-CM

## 2025-03-04 DIAGNOSIS — N40.1 BENIGN PROSTATIC HYPERPLASIA WITH URINARY OBSTRUCTION: ICD-10-CM

## 2025-03-04 DIAGNOSIS — E78.5 DYSLIPIDEMIA: ICD-10-CM

## 2025-03-04 DIAGNOSIS — E83.110 HEREDITARY HEMOCHROMATOSIS: ICD-10-CM

## 2025-03-04 DIAGNOSIS — J30.2 SEASONAL ALLERGIES: ICD-10-CM

## 2025-03-04 DIAGNOSIS — G47.33 OSA ON CPAP: ICD-10-CM

## 2025-03-04 PROCEDURE — 99999 PR PBB SHADOW E&M-EST. PATIENT-LVL III: CPT | Mod: PBBFAC,,, | Performed by: INTERNAL MEDICINE

## 2025-03-04 PROCEDURE — 99386 PREV VISIT NEW AGE 40-64: CPT | Mod: S$GLB,,, | Performed by: INTERNAL MEDICINE

## 2025-03-04 RX ORDER — OLMESARTAN MEDOXOMIL 40 MG/1
40 TABLET ORAL DAILY
Qty: 90 TABLET | Refills: 1 | Status: SHIPPED | OUTPATIENT
Start: 2025-03-04

## 2025-03-04 RX ORDER — CETIRIZINE HYDROCHLORIDE 10 MG/1
10 TABLET ORAL DAILY
Qty: 30 TABLET | Refills: 11 | Status: SHIPPED | OUTPATIENT
Start: 2025-03-04

## 2025-03-04 NOTE — PROGRESS NOTES
Chief Complaint  Chief Complaint   Patient presents with    Annual Exam        HPI  Iban Amos is a 45 y.o. male here today for the following: The primary encounter diagnosis was Annual wellness visit. Diagnoses of Essential hypertension, Dyslipidemia, Hereditary hemochromatosis, Family history of breast cancer, BHAVIK on CPAP, Benign prostatic hyperplasia with urinary obstruction, History of elevated glucose, and Seasonal allergies were also pertinent to this visit.  Patient reported not experiencing significant side effects, except as noted in HPI if applicable.    March 4, 2025.  Patient reported here for annual exam.  BP was at goal. MD counseled patient and answered questions.   MD obtained and reviewed records and discuss results with patient.  Creatinine was 0.9 February 2025.  PSA was 0.92 in the setting of enlarged prostate.  Colonoscopy done Sept 2024, positive polyp, rtc in 7 yrs.   Hereditary hemochromatosis, liver enzymes AST and ALT were within normal limits and ferritin was 758 elevated but improved from 1153.    A1c was 5.0 February 2024 in the setting of hereditary hemochromatosis.  MD counseled patient and answered questions.  LDL cholesterol was 126 February 2024 at goal for patient.  MD counseled patient and answered questions.  Patient reported doing well.  MD counseled patient and answered questions.     PHQ-2 (Reflex to PHQ-9) Interpretation:  Over the last two weeks how often have you been bothered by little interest or pleasure in doing things: 0  Over the last two weeks how often have you been bothered by feeling down, depressed or hopeless: 0  PHQ-2 Total Score: 0       Review of patient's allergies indicates:   Allergen Reactions    Codeine Other (See Comments)     unknown    Pcn [penicillins] Other (See Comments)     unknown       MEDS:  Current Outpatient Medications   Medication Instructions    cetirizine (ZYRTEC) 10 mg, Oral, Daily    LIDOcaine-prilocaine (EMLA) cream Apply to  affected area once    naproxen sodium (ANAPROX) 220 mg, Every 12 hours (non-standard times)    olmesartan (BENICAR) 40 mg, Oral, Daily        Past Medical History:   Diagnosis Date    Hereditary hemochromatosis 01/14/2025    FE Result Summary    COMPLEX (SEE RESULT AND INTERPRETATION)      Hereditary Hemochromatosis    SEE BELOW      Comment: C282Y: Two copies of the C282Y variant were identified.  H63D: Not detected.      Interpretation    SEE BELOW      Comment: This individual is homozygous for C282Y. This result may be  consistent with, but does not confirm a diagnosis of or  predisposition for hereditary hemochro    Sleep apnea     Unspecified essential hypertension        Past Surgical History:   Procedure Laterality Date    COLONOSCOPY N/A 9/18/2024    Procedure: COLONOSCOPY;  Surgeon: Ricco Irby MD;  Location: [facility];  Service: Gastroenterology;  Laterality: N/A;    FRACTURE SURGERY  2010    RECONSTRUCTION OF NOSE      TONSILLECTOMY, ADENOIDECTOMY         Family History   Problem Relation Name Age of Onset    Breast cancer Mother M     Cancer Mother M     Hypertension Father D     Heart disease Father D     Stroke Father D     Skin cancer Father D     Diabetes Brother C     Obesity Brother      Cancer Maternal Grandmother          unknown type    Cancer Maternal Grandfather          unknown type    Lung cancer Other      Inflammatory bowel disease Neg Hx      Rambo's disease Neg Hx         Social History     Substance and Sexual Activity   Drug Use No       Review of Systems   Review of Systems   Constitutional:         As per HPI, otherwise negative.    HENT:          As per HPI, otherwise negative.    Eyes:         As per HPI, otherwise negative.    Respiratory:          As per HPI, otherwise negative.    Cardiovascular:         As per HPI, otherwise negative.    Gastrointestinal:         As per HPI, otherwise negative.    Genitourinary:         As per HPI, otherwise negative.   "  Musculoskeletal:         As per HPI, otherwise negative.    Skin:         As per HPI, otherwise negative.    Neurological:         As per HPI, otherwise negative.    Endo/Heme/Allergies:         As per HPI, otherwise negative.    Psychiatric/Behavioral:          As per HPI, otherwise negative.        Objective   Vitals:    03/04/25 1411   BP: 130/84   Patient Position: Sitting   Pulse: 68   Temp: 96.7 °F (35.9 °C)   TempSrc: Tympanic   SpO2: 95%   Weight: 87.5 kg (192 lb 14.4 oz)   Height: 5' 7" (1.702 m)       Body mass index is 30.21 kg/m².    Physical Exam  HENT:      Head: Normocephalic.   Eyes:      Extraocular Movements: Extraocular movements intact.   Cardiovascular:      Rate and Rhythm: Regular rhythm.   Pulmonary:      Effort: No respiratory distress.   Abdominal:      Palpations: Abdomen is soft.   Musculoskeletal:      Cervical back: Neck supple.   Skin:     General: Skin is warm.   Neurological:      Mental Status: He is alert. Mental status is at baseline.         Recent labs:   No results found for this or any previous visit (from the past 24 hours).    Assessment / Plan  Iban Baig" was seen today for annual exam.    Diagnoses and all orders for this visit:    Annual wellness visit  -     Lipid Panel; Future  -     TSH; Future  -     PSA, Screening; Future  -     Hemoglobin A1C; Future  -     GROUP & RH; Future    Essential hypertension  -     Lipid Panel; Future  -     TSH; Future  -     PSA, Screening; Future  -     Hemoglobin A1C; Future  -     olmesartan (BENICAR) 40 MG tablet; Take 1 tablet (40 mg total) by mouth once daily.  -     GROUP & RH; Future    Dyslipidemia  -     Lipid Panel; Future  -     TSH; Future  -     PSA, Screening; Future  -     Hemoglobin A1C; Future  -     GROUP & RH; Future    Hereditary hemochromatosis  -     Lipid Panel; Future  -     TSH; Future  -     PSA, Screening; Future  -     Hemoglobin A1C; Future  -     GROUP & RH; Future    Family history of breast " cancer  Comments:  Mom Dx'd in early 40s, passed from it.  Orders:  -     GROUP & RH; Future    BHAVIK on CPAP  -     Lipid Panel; Future  -     TSH; Future  -     PSA, Screening; Future  -     Hemoglobin A1C; Future  -     GROUP & RH; Future    Benign prostatic hyperplasia with urinary obstruction  -     Lipid Panel; Future  -     TSH; Future  -     PSA, Screening; Future  -     Hemoglobin A1C; Future  -     GROUP & RH; Future    History of elevated glucose  -     Lipid Panel; Future  -     TSH; Future  -     PSA, Screening; Future  -     Hemoglobin A1C; Future  -     GROUP & RH; Future    Seasonal allergies  -     Lipid Panel; Future  -     TSH; Future  -     PSA, Screening; Future  -     Hemoglobin A1C; Future  -     cetirizine (ZYRTEC) 10 MG tablet; Take 1 tablet (10 mg total) by mouth once daily.  -     GROUP & RH; Future

## 2025-03-14 ENCOUNTER — INFUSION (OUTPATIENT)
Dept: INFUSION THERAPY | Facility: HOSPITAL | Age: 45
End: 2025-03-14
Attending: INTERNAL MEDICINE
Payer: COMMERCIAL

## 2025-03-14 VITALS
HEART RATE: 52 BPM | SYSTOLIC BLOOD PRESSURE: 161 MMHG | DIASTOLIC BLOOD PRESSURE: 102 MMHG | TEMPERATURE: 98 F | OXYGEN SATURATION: 98 % | RESPIRATION RATE: 18 BRPM

## 2025-03-14 DIAGNOSIS — E83.110 HEREDITARY HEMOCHROMATOSIS: Primary | ICD-10-CM

## 2025-03-14 PROCEDURE — 99195 PHLEBOTOMY: CPT

## 2025-03-14 RX ORDER — SODIUM CHLORIDE 0.9 % (FLUSH) 0.9 %
10 SYRINGE (ML) INJECTION
OUTPATIENT
Start: 2025-03-14

## 2025-03-14 RX ORDER — HEPARIN 100 UNIT/ML
500 SYRINGE INTRAVENOUS
OUTPATIENT
Start: 2025-03-14

## 2025-03-14 NOTE — NURSING
1 unit therapeutic phlebotomy performed via L AC then discarded, pressure dressing applied.  Pt refused juice or snack.  Blood pressure 148/96 upon completion.  Pt denies any complaints of dizziness, lightheadedness, or faintness.  Pt to return to clinic on 4/11/25.  Patient ambulated out without difficulty.

## 2025-04-11 ENCOUNTER — INFUSION (OUTPATIENT)
Dept: INFUSION THERAPY | Facility: HOSPITAL | Age: 45
End: 2025-04-11
Attending: INTERNAL MEDICINE
Payer: COMMERCIAL

## 2025-04-11 VITALS
TEMPERATURE: 98 F | SYSTOLIC BLOOD PRESSURE: 126 MMHG | DIASTOLIC BLOOD PRESSURE: 86 MMHG | OXYGEN SATURATION: 99 % | RESPIRATION RATE: 16 BRPM | HEART RATE: 67 BPM

## 2025-04-11 DIAGNOSIS — E83.110 HEREDITARY HEMOCHROMATOSIS: Primary | ICD-10-CM

## 2025-04-11 PROCEDURE — 99195 PHLEBOTOMY: CPT

## 2025-04-11 RX ORDER — HEPARIN 100 UNIT/ML
500 SYRINGE INTRAVENOUS
OUTPATIENT
Start: 2025-04-11

## 2025-04-11 RX ORDER — SODIUM CHLORIDE 0.9 % (FLUSH) 0.9 %
10 SYRINGE (ML) INJECTION
OUTPATIENT
Start: 2025-04-11

## 2025-04-11 NOTE — NURSING
1 unit therapeutic phlebotomy performed via__R__AC then discarded.  Pressure dressing applied.  Pt given juice or snack. Pt denies any complaints of dizziness, lightheadedness, or feeling faint at the time of discharge. Patient ambulated out of infusion clinic without difficulty.

## 2025-05-09 ENCOUNTER — INFUSION (OUTPATIENT)
Dept: INFUSION THERAPY | Facility: HOSPITAL | Age: 45
End: 2025-05-09
Attending: INTERNAL MEDICINE
Payer: COMMERCIAL

## 2025-05-09 VITALS
HEART RATE: 68 BPM | OXYGEN SATURATION: 96 % | TEMPERATURE: 98 F | SYSTOLIC BLOOD PRESSURE: 149 MMHG | RESPIRATION RATE: 16 BRPM | DIASTOLIC BLOOD PRESSURE: 95 MMHG

## 2025-05-09 DIAGNOSIS — E83.110 HEREDITARY HEMOCHROMATOSIS: Primary | ICD-10-CM

## 2025-05-09 PROCEDURE — 99195 PHLEBOTOMY: CPT

## 2025-05-09 RX ORDER — SODIUM CHLORIDE 0.9 % (FLUSH) 0.9 %
10 SYRINGE (ML) INJECTION
OUTPATIENT
Start: 2025-05-09

## 2025-05-09 RX ORDER — HEPARIN 100 UNIT/ML
500 SYRINGE INTRAVENOUS
OUTPATIENT
Start: 2025-05-09

## 2025-05-09 NOTE — NURSING
1 unit therapeutic phlebotomy performed via_R__AC then discarded, pressure dressing applied.  Pt refused juice or snack.  Blood pressure _149/95_____ upon completion.  Pt denies any complaints of dizziness, lightheadedness, or faintness.  Pt to return to clinic on __6/6/25___.  Patient ambulated out without difficulty.

## 2025-05-12 ENCOUNTER — RESULTS FOLLOW-UP (OUTPATIENT)
Dept: HEMATOLOGY/ONCOLOGY | Facility: CLINIC | Age: 45
End: 2025-05-12

## 2025-05-12 ENCOUNTER — LAB VISIT (OUTPATIENT)
Dept: LAB | Facility: HOSPITAL | Age: 45
End: 2025-05-12
Attending: NURSE PRACTITIONER
Payer: COMMERCIAL

## 2025-05-12 DIAGNOSIS — E83.19 IRON OVERLOAD: ICD-10-CM

## 2025-05-12 DIAGNOSIS — I10 ESSENTIAL HYPERTENSION: ICD-10-CM

## 2025-05-12 DIAGNOSIS — E83.110 HEREDITARY HEMOCHROMATOSIS: ICD-10-CM

## 2025-05-12 DIAGNOSIS — Z86.39 HISTORY OF ELEVATED GLUCOSE: ICD-10-CM

## 2025-05-12 DIAGNOSIS — R74.8 ELEVATED LIVER ENZYMES: ICD-10-CM

## 2025-05-12 DIAGNOSIS — N40.1 BENIGN PROSTATIC HYPERPLASIA WITH URINARY OBSTRUCTION: ICD-10-CM

## 2025-05-12 DIAGNOSIS — Z80.3 FAMILY HISTORY OF BREAST CANCER: ICD-10-CM

## 2025-05-12 DIAGNOSIS — R93.2 ABNORMAL FINDING ON IMAGING OF LIVER: ICD-10-CM

## 2025-05-12 DIAGNOSIS — E78.5 DYSLIPIDEMIA: ICD-10-CM

## 2025-05-12 DIAGNOSIS — N13.8 BENIGN PROSTATIC HYPERPLASIA WITH URINARY OBSTRUCTION: ICD-10-CM

## 2025-05-12 DIAGNOSIS — K76.0 HEPATIC STEATOSIS: ICD-10-CM

## 2025-05-12 DIAGNOSIS — G47.33 OSA ON CPAP: ICD-10-CM

## 2025-05-12 DIAGNOSIS — J30.2 SEASONAL ALLERGIES: ICD-10-CM

## 2025-05-12 DIAGNOSIS — Z00.00 ANNUAL WELLNESS VISIT: ICD-10-CM

## 2025-05-12 LAB
ABSOLUTE EOSINOPHIL (OHS): 0.36 K/UL
ABSOLUTE MONOCYTE (OHS): 0.84 K/UL (ref 0.3–1)
ABSOLUTE NEUTROPHIL COUNT (OHS): 3 K/UL (ref 1.8–7.7)
AFP SERPL-MCNC: 2.7 NG/ML
ALBUMIN SERPL BCP-MCNC: 4 G/DL (ref 3.5–5.2)
ALP SERPL-CCNC: 83 UNIT/L (ref 40–150)
ALT SERPL W/O P-5'-P-CCNC: 36 UNIT/L (ref 10–44)
ANION GAP (OHS): 8 MMOL/L (ref 8–16)
AST SERPL-CCNC: 19 UNIT/L (ref 11–45)
BASOPHILS # BLD AUTO: 0.07 K/UL
BASOPHILS NFR BLD AUTO: 0.9 %
BILIRUB SERPL-MCNC: 0.3 MG/DL (ref 0.1–1)
BUN SERPL-MCNC: 16 MG/DL (ref 6–20)
CALCIUM SERPL-MCNC: 8.8 MG/DL (ref 8.7–10.5)
CHLORIDE SERPL-SCNC: 105 MMOL/L (ref 95–110)
CHOLEST SERPL-MCNC: 216 MG/DL (ref 120–199)
CHOLEST/HDLC SERPL: 4.8 {RATIO} (ref 2–5)
CO2 SERPL-SCNC: 26 MMOL/L (ref 23–29)
CREAT SERPL-MCNC: 0.8 MG/DL (ref 0.5–1.4)
EAG (OHS): 97 MG/DL (ref 68–131)
ERYTHROCYTE [DISTWIDTH] IN BLOOD BY AUTOMATED COUNT: 12.2 % (ref 11.5–14.5)
FERRITIN SERPL-MCNC: 474 NG/ML (ref 20–300)
GFR SERPLBLD CREATININE-BSD FMLA CKD-EPI: >60 ML/MIN/1.73/M2
GLUCOSE SERPL-MCNC: 81 MG/DL (ref 70–110)
HBA1C MFR BLD: 5 % (ref 4–5.6)
HCT VFR BLD AUTO: 43 % (ref 40–54)
HDLC SERPL-MCNC: 45 MG/DL (ref 40–75)
HDLC SERPL: 20.8 % (ref 20–50)
HGB BLD-MCNC: 15.4 GM/DL (ref 14–18)
IMM GRANULOCYTES # BLD AUTO: 0.04 K/UL (ref 0–0.04)
IMM GRANULOCYTES NFR BLD AUTO: 0.5 % (ref 0–0.5)
IRON SATN MFR SERPL: 48 % (ref 20–50)
IRON SERPL-MCNC: 150 UG/DL (ref 45–160)
LDLC SERPL CALC-MCNC: ABNORMAL MG/DL
LYMPHOCYTES # BLD AUTO: 3.12 K/UL (ref 1–4.8)
MCH RBC QN AUTO: 33.3 PG (ref 27–31)
MCHC RBC AUTO-ENTMCNC: 35.8 G/DL (ref 32–36)
MCV RBC AUTO: 93 FL (ref 82–98)
NONHDLC SERPL-MCNC: 171 MG/DL
NUCLEATED RBC (/100WBC) (OHS): 0 /100 WBC
PLATELET # BLD AUTO: 164 K/UL (ref 150–450)
PMV BLD AUTO: 10.2 FL (ref 9.2–12.9)
POTASSIUM SERPL-SCNC: 4.3 MMOL/L (ref 3.5–5.1)
PROT SERPL-MCNC: 7.2 GM/DL (ref 6–8.4)
PSA SERPL-MCNC: 0.88 NG/ML
RBC # BLD AUTO: 4.62 M/UL (ref 4.6–6.2)
RELATIVE EOSINOPHIL (OHS): 4.8 %
RELATIVE LYMPHOCYTE (OHS): 42 % (ref 18–48)
RELATIVE MONOCYTE (OHS): 11.3 % (ref 4–15)
RELATIVE NEUTROPHIL (OHS): 40.5 % (ref 38–73)
RH BLD: NORMAL
SODIUM SERPL-SCNC: 139 MMOL/L (ref 136–145)
TIBC SERPL-MCNC: 314 UG/DL (ref 250–450)
TRANSFERRIN SERPL-MCNC: 212 MG/DL (ref 200–375)
TRIGL SERPL-MCNC: 420 MG/DL (ref 30–150)
TSH SERPL-ACNC: 1.67 UIU/ML (ref 0.4–4)
WBC # BLD AUTO: 7.43 K/UL (ref 3.9–12.7)

## 2025-05-12 PROCEDURE — 84075 ASSAY ALKALINE PHOSPHATASE: CPT

## 2025-05-12 PROCEDURE — 85025 COMPLETE CBC W/AUTO DIFF WBC: CPT

## 2025-05-12 PROCEDURE — 83036 HEMOGLOBIN GLYCOSYLATED A1C: CPT

## 2025-05-12 PROCEDURE — 84443 ASSAY THYROID STIM HORMONE: CPT

## 2025-05-12 PROCEDURE — 82105 ALPHA-FETOPROTEIN SERUM: CPT

## 2025-05-12 PROCEDURE — 82728 ASSAY OF FERRITIN: CPT

## 2025-05-12 PROCEDURE — 84466 ASSAY OF TRANSFERRIN: CPT

## 2025-05-12 PROCEDURE — 84153 ASSAY OF PSA TOTAL: CPT

## 2025-05-12 PROCEDURE — 86901 BLOOD TYPING SEROLOGIC RH(D): CPT | Performed by: INTERNAL MEDICINE

## 2025-05-12 PROCEDURE — 36415 COLL VENOUS BLD VENIPUNCTURE: CPT

## 2025-05-12 PROCEDURE — 80061 LIPID PANEL: CPT

## 2025-05-13 ENCOUNTER — RESULTS FOLLOW-UP (OUTPATIENT)
Dept: INTERNAL MEDICINE | Facility: CLINIC | Age: 45
End: 2025-05-13

## 2025-05-13 NOTE — PROGRESS NOTES
Your triglycerides were 420 high.  Recommend eat healthier decrease alcohol, sugars and starches and increasing activity.  Recheck yearly or sooner if patient wishes.  Your sugars (hemoglobin A1c), LDL cholesterol, thyroid, PSA, liver and kidney function were good.  Blood type is O positive.

## 2025-05-14 ENCOUNTER — RESULTS FOLLOW-UP (OUTPATIENT)
Dept: HEPATOLOGY | Facility: CLINIC | Age: 45
End: 2025-05-14
Payer: COMMERCIAL

## 2025-06-05 PROBLEM — E83.19 IRON OVERLOAD: Status: ACTIVE | Noted: 2025-06-05

## 2025-08-11 ENCOUNTER — LAB VISIT (OUTPATIENT)
Dept: LAB | Facility: HOSPITAL | Age: 45
End: 2025-08-11
Attending: INTERNAL MEDICINE
Payer: COMMERCIAL

## 2025-08-11 DIAGNOSIS — E83.110 HEREDITARY HEMOCHROMATOSIS: ICD-10-CM

## 2025-08-11 DIAGNOSIS — R74.8 ELEVATED LIVER ENZYMES: ICD-10-CM

## 2025-08-11 DIAGNOSIS — E83.19 IRON OVERLOAD: ICD-10-CM

## 2025-08-11 LAB
ABSOLUTE EOSINOPHIL (OHS): 0.26 K/UL
ABSOLUTE MONOCYTE (OHS): 0.77 K/UL (ref 0.3–1)
ABSOLUTE NEUTROPHIL COUNT (OHS): 3.64 K/UL (ref 1.8–7.7)
ALBUMIN SERPL BCP-MCNC: 4.6 G/DL (ref 3.5–5.2)
ALP SERPL-CCNC: 82 UNIT/L (ref 40–150)
ALT SERPL W/O P-5'-P-CCNC: 35 UNIT/L (ref 10–44)
ANION GAP (OHS): 12 MMOL/L (ref 8–16)
AST SERPL-CCNC: 23 UNIT/L (ref 11–45)
BASOPHILS # BLD AUTO: 0.06 K/UL
BASOPHILS NFR BLD AUTO: 0.7 %
BILIRUB SERPL-MCNC: 0.5 MG/DL (ref 0.1–1)
BUN SERPL-MCNC: 17 MG/DL (ref 6–20)
CALCIUM SERPL-MCNC: 9.2 MG/DL (ref 8.7–10.5)
CHLORIDE SERPL-SCNC: 106 MMOL/L (ref 95–110)
CO2 SERPL-SCNC: 24 MMOL/L (ref 23–29)
CREAT SERPL-MCNC: 0.8 MG/DL (ref 0.5–1.4)
ERYTHROCYTE [DISTWIDTH] IN BLOOD BY AUTOMATED COUNT: 12.6 % (ref 11.5–14.5)
GFR SERPLBLD CREATININE-BSD FMLA CKD-EPI: >60 ML/MIN/1.73/M2
GLUCOSE SERPL-MCNC: 73 MG/DL (ref 70–110)
HCT VFR BLD AUTO: 48.8 % (ref 40–54)
HGB BLD-MCNC: 16.8 GM/DL (ref 14–18)
IMM GRANULOCYTES # BLD AUTO: 0.04 K/UL (ref 0–0.04)
IMM GRANULOCYTES NFR BLD AUTO: 0.5 % (ref 0–0.5)
LYMPHOCYTES # BLD AUTO: 3.33 K/UL (ref 1–4.8)
MCH RBC QN AUTO: 32.4 PG (ref 27–31)
MCHC RBC AUTO-ENTMCNC: 34.4 G/DL (ref 32–36)
MCV RBC AUTO: 94 FL (ref 82–98)
NUCLEATED RBC (/100WBC) (OHS): 0 /100 WBC
PLATELET # BLD AUTO: 182 K/UL (ref 150–450)
PMV BLD AUTO: 11.2 FL (ref 9.2–12.9)
POTASSIUM SERPL-SCNC: 3.9 MMOL/L (ref 3.5–5.1)
PROT SERPL-MCNC: 7.8 GM/DL (ref 6–8.4)
RBC # BLD AUTO: 5.18 M/UL (ref 4.6–6.2)
RELATIVE EOSINOPHIL (OHS): 3.2 %
RELATIVE LYMPHOCYTE (OHS): 41.1 % (ref 18–48)
RELATIVE MONOCYTE (OHS): 9.5 % (ref 4–15)
RELATIVE NEUTROPHIL (OHS): 45 % (ref 38–73)
SODIUM SERPL-SCNC: 142 MMOL/L (ref 136–145)
WBC # BLD AUTO: 8.1 K/UL (ref 3.9–12.7)

## 2025-08-11 PROCEDURE — 85025 COMPLETE CBC W/AUTO DIFF WBC: CPT

## 2025-08-11 PROCEDURE — 36415 COLL VENOUS BLD VENIPUNCTURE: CPT | Mod: PO

## 2025-08-11 PROCEDURE — 84466 ASSAY OF TRANSFERRIN: CPT

## 2025-08-11 PROCEDURE — 80053 COMPREHEN METABOLIC PANEL: CPT

## 2025-08-11 PROCEDURE — 82728 ASSAY OF FERRITIN: CPT

## 2025-08-11 PROCEDURE — 82105 ALPHA-FETOPROTEIN SERUM: CPT

## 2025-08-12 LAB
AFP SERPL-MCNC: 2.6 NG/ML
FERRITIN SERPL-MCNC: 645 NG/ML (ref 20–300)
IRON SATN MFR SERPL: 32 % (ref 20–50)
IRON SERPL-MCNC: 100 UG/DL (ref 45–160)
TIBC SERPL-MCNC: 311 UG/DL (ref 250–450)
TRANSFERRIN SERPL-MCNC: 210 MG/DL (ref 200–375)

## 2025-08-14 ENCOUNTER — OFFICE VISIT (OUTPATIENT)
Dept: INTERNAL MEDICINE | Facility: CLINIC | Age: 45
End: 2025-08-14
Payer: COMMERCIAL

## 2025-08-14 VITALS
HEART RATE: 60 BPM | SYSTOLIC BLOOD PRESSURE: 136 MMHG | WEIGHT: 195.75 LBS | BODY MASS INDEX: 30.66 KG/M2 | OXYGEN SATURATION: 96 % | DIASTOLIC BLOOD PRESSURE: 82 MMHG | TEMPERATURE: 98 F

## 2025-08-14 DIAGNOSIS — Z86.39 HISTORY OF ELEVATED GLUCOSE: Chronic | ICD-10-CM

## 2025-08-14 DIAGNOSIS — I10 ESSENTIAL HYPERTENSION: Chronic | ICD-10-CM

## 2025-08-14 DIAGNOSIS — G47.33 OSA ON CPAP: ICD-10-CM

## 2025-08-14 DIAGNOSIS — K59.00 CONSTIPATION, UNSPECIFIED CONSTIPATION TYPE: Chronic | ICD-10-CM

## 2025-08-14 DIAGNOSIS — Z00.00 ANNUAL WELLNESS VISIT: Primary | ICD-10-CM

## 2025-08-14 DIAGNOSIS — J30.2 SEASONAL ALLERGIES: Chronic | ICD-10-CM

## 2025-08-14 DIAGNOSIS — R53.83 FATIGUE, UNSPECIFIED TYPE: Chronic | ICD-10-CM

## 2025-08-14 DIAGNOSIS — E83.110 HEREDITARY HEMOCHROMATOSIS: Chronic | ICD-10-CM

## 2025-08-14 DIAGNOSIS — E66.811 OBESITY (BMI 30.0-34.9): Chronic | ICD-10-CM

## 2025-08-14 PROCEDURE — 99999 PR PBB SHADOW E&M-EST. PATIENT-LVL III: CPT | Mod: PBBFAC,,, | Performed by: INTERNAL MEDICINE

## 2025-08-14 PROCEDURE — 99396 PREV VISIT EST AGE 40-64: CPT | Mod: S$GLB,,, | Performed by: INTERNAL MEDICINE

## 2025-08-14 RX ORDER — TIRZEPATIDE 2.5 MG/.5ML
2.5 INJECTION, SOLUTION SUBCUTANEOUS
Qty: 2 ML | Refills: 1 | Status: SHIPPED | OUTPATIENT
Start: 2025-08-14

## 2025-08-14 RX ORDER — OLMESARTAN MEDOXOMIL 40 MG/1
40 TABLET ORAL DAILY
Qty: 90 TABLET | Refills: 1 | Status: SHIPPED | OUTPATIENT
Start: 2025-08-14

## 2025-08-14 RX ORDER — CETIRIZINE HYDROCHLORIDE 10 MG/1
10 TABLET ORAL DAILY
Qty: 30 TABLET | Refills: 11 | Status: SHIPPED | OUTPATIENT
Start: 2025-08-14

## 2025-08-14 RX ORDER — TIRZEPATIDE 2.5 MG/.5ML
2.5 INJECTION, SOLUTION SUBCUTANEOUS
Qty: 2 ML | Refills: 1 | Status: SHIPPED | OUTPATIENT
Start: 2025-08-14 | End: 2025-08-14 | Stop reason: SDUPTHER

## 2025-08-18 ENCOUNTER — PATIENT MESSAGE (OUTPATIENT)
Dept: INTERNAL MEDICINE | Facility: CLINIC | Age: 45
End: 2025-08-18
Payer: COMMERCIAL

## 2025-08-18 DIAGNOSIS — Z86.39 HISTORY OF ELEVATED GLUCOSE: Chronic | ICD-10-CM

## 2025-08-18 DIAGNOSIS — E66.811 OBESITY (BMI 30.0-34.9): Chronic | ICD-10-CM

## 2025-08-18 DIAGNOSIS — G47.33 OSA ON CPAP: ICD-10-CM

## 2025-08-18 DIAGNOSIS — R53.83 FATIGUE, UNSPECIFIED TYPE: Chronic | ICD-10-CM

## 2025-08-18 DIAGNOSIS — I10 ESSENTIAL HYPERTENSION: Chronic | ICD-10-CM

## 2025-08-26 RX ORDER — TIRZEPATIDE 2.5 MG/.5ML
2.5 INJECTION, SOLUTION SUBCUTANEOUS
Qty: 2 ML | Refills: 1 | Status: SHIPPED | OUTPATIENT
Start: 2025-08-26

## 2025-08-28 ENCOUNTER — OFFICE VISIT (OUTPATIENT)
Dept: HEMATOLOGY/ONCOLOGY | Facility: CLINIC | Age: 45
End: 2025-08-28
Payer: COMMERCIAL

## 2025-08-28 DIAGNOSIS — E83.110 HEREDITARY HEMOCHROMATOSIS: Primary | ICD-10-CM

## 2025-08-28 PROCEDURE — 98005 SYNCH AUDIO-VIDEO EST LOW 20: CPT | Mod: 95,,, | Performed by: INTERNAL MEDICINE

## 2025-09-04 DIAGNOSIS — R53.83 FATIGUE, UNSPECIFIED TYPE: ICD-10-CM

## 2025-09-04 DIAGNOSIS — E88.810 METABOLIC SYNDROME: ICD-10-CM

## 2025-09-04 DIAGNOSIS — Z86.39 HISTORY OF ELEVATED GLUCOSE: ICD-10-CM

## 2025-09-04 DIAGNOSIS — G47.33 OSA ON CPAP: Primary | ICD-10-CM

## 2025-09-04 RX ORDER — SYRINGE,SAFETY WITH NEEDLE,1ML 25GX1"
SYRINGE (EA) MISCELLANEOUS
Qty: 100 EACH | Refills: 11 | Status: SHIPPED | OUTPATIENT
Start: 2025-09-04

## 2025-09-04 RX ORDER — TIRZEPATIDE 2.5 MG/.5ML
2.5 INJECTION, SOLUTION SUBCUTANEOUS
Qty: 2 ML | Refills: 2 | Status: SHIPPED | OUTPATIENT
Start: 2025-09-04 | End: 2025-11-27